# Patient Record
Sex: FEMALE | Race: WHITE | NOT HISPANIC OR LATINO | Employment: OTHER | URBAN - METROPOLITAN AREA
[De-identification: names, ages, dates, MRNs, and addresses within clinical notes are randomized per-mention and may not be internally consistent; named-entity substitution may affect disease eponyms.]

---

## 2017-01-17 ENCOUNTER — OFFICE VISIT (OUTPATIENT)
Dept: UROLOGY | Facility: CLINIC | Age: 67
End: 2017-01-17
Payer: MEDICARE

## 2017-01-17 VITALS
HEIGHT: 64 IN | SYSTOLIC BLOOD PRESSURE: 118 MMHG | BODY MASS INDEX: 25.41 KG/M2 | HEART RATE: 82 BPM | WEIGHT: 148.81 LBS | DIASTOLIC BLOOD PRESSURE: 72 MMHG

## 2017-01-17 DIAGNOSIS — Z00.00 WELLNESS EXAMINATION: ICD-10-CM

## 2017-01-17 DIAGNOSIS — N34.2 URETHRITIS: Primary | ICD-10-CM

## 2017-01-17 LAB
BILIRUB SERPL-MCNC: NORMAL MG/DL
BLOOD URINE, POC: NORMAL
COLOR, POC UA: YELLOW
GLUCOSE UR QL STRIP: NORMAL
KETONES UR QL STRIP: NORMAL
LEUKOCYTE ESTERASE URINE, POC: NORMAL
NITRITE, POC UA: NORMAL
PH, POC UA: 6
PROTEIN, POC: NORMAL
SPECIFIC GRAVITY, POC UA: 1.01
UROBILINOGEN, POC UA: NORMAL

## 2017-01-17 PROCEDURE — 81002 URINALYSIS NONAUTO W/O SCOPE: CPT | Mod: S$GLB,,, | Performed by: UROLOGY

## 2017-01-17 PROCEDURE — 1159F MED LIST DOCD IN RCRD: CPT | Mod: S$GLB,,, | Performed by: UROLOGY

## 2017-01-17 PROCEDURE — 99999 PR PBB SHADOW E&M-EST. PATIENT-LVL III: CPT | Mod: PBBFAC,,, | Performed by: UROLOGY

## 2017-01-17 PROCEDURE — 99203 OFFICE O/P NEW LOW 30 MIN: CPT | Mod: 25,S$GLB,, | Performed by: UROLOGY

## 2017-01-17 RX ORDER — CIPROFLOXACIN 500 MG/1
500 TABLET ORAL 2 TIMES DAILY
Qty: 6 TABLET | Refills: 0 | Status: SHIPPED | OUTPATIENT
Start: 2017-01-17 | End: 2017-01-20

## 2017-01-17 RX ORDER — ESTRADIOL 0.1 MG/G
1 CREAM VAGINAL EVERY OTHER DAY
Qty: 1 TUBE | Status: SHIPPED | OUTPATIENT
Start: 2017-01-17 | End: 2017-02-13

## 2017-01-17 NOTE — MR AVS SNAPSHOT
[facility] - Urology 4th Floor  [address]  [facility] [address]  Phone: [phone]                  Andra Dunn   2017 9:00 AM   Office Visit    Description:  Female : 1950   Provider:  Panda Hicks MD   Department:  [facility] - Urology 4th Floor           Reason for Visit     new patient           Diagnoses this Visit        Comments    Urethritis    -  Primary     Wellness examination                To Do List           Future Appointments        Provider Department Dept Phone    2017 1:00 PM Domo Lyon MD [facility] - Rheumatology [phone]      Goals (5 Years of Data)     None      Follow-Up and Disposition     Return if symptoms worsen or fail to improve.       These Medications        Disp Refills Start End    ciprofloxacin HCl (CIPRO) 500 MG tablet 6 tablet 0 2017    Take 1 tablet (500 mg total) by mouth 2 (two) times daily. - Oral    Pharmacy: [address] Ph #: [phone]       estradiol (ESTRACE) 0.01 % (0.1 mg/gram) vaginal cream 1 Tube prn 2017    Place 1 g vaginally every other day. - Vaginal    Pharmacy: [address] Ph #: [phone]         [facility]     [facility] On Call Nurse Care Line - 24/7 Assistance  Registered nurses in the [facility] On Call Center provide clinical advisement, health education, appointment booking, and other advisory services.  Call for this free service at [phone].             Medications           Message regarding Medications     Verify the changes and/or additions to your medication regime listed below are the same as discussed with your clinician today.  If any of these changes or additions are incorrect, please notify your healthcare provider.        START taking these NEW medications        Refills    ciprofloxacin HCl (CIPRO) 500 MG tablet 0    Sig: Take 1 tablet (500  "mg total) by mouth 2 (two) times daily.    Class: Normal    Route: Oral    estradiol (ESTRACE) 0.01 % (0.1 mg/gram) vaginal cream prn    Sig: Place 1 g vaginally every other day.    Class: Print    Route: Vaginal           Verify that the below list of medications is an accurate representation of the medications you are currently taking.  If none reported, the list may be blank. If incorrect, please contact your healthcare provider. Carry this list with you in case of emergency.           Current Medications     ciprofloxacin HCl (CIPRO) 500 MG tablet Take 1 tablet (500 mg total) by mouth 2 (two) times daily.    estradiol (ESTRACE) 0.01 % (0.1 mg/gram) vaginal cream Place 1 g vaginally every other day.           Clinical Reference Information           Vital Signs - Last Recorded  Most recent update: 1/17/2017  9:15 AM by Ina Huerta MA    BP Pulse Ht Wt BMI    118/72 82 5' 4" (1.626 m) 67.5 kg (148 lb 13 oz) 25.54 kg/m2      Blood Pressure          Most Recent Value    BP  118/72      Allergies as of 1/17/2017     No Known Allergies      Immunizations Administered on Date of Encounter - 1/17/2017     None      Orders Placed During Today's Visit      Normal Orders This Visit    POCT urine dipstick without microscope     Future Labs/Procedures Expected by Expires    CBC auto differential  1/17/2017 1/17/2018    Comprehensive metabolic panel  1/17/2017 1/17/2018    Hemoglobin A1c  1/17/2017 1/17/2018    Lipid panel  1/17/2017 1/17/2018    Cystoscopy  As directed 1/17/2018      "

## 2017-01-17 NOTE — PROGRESS NOTES
CHIEF COMPLAINT:    Mrs. Dunn is a 66 y.o. female presenting with discomfort with urination    PRESENTING ILLNESS:    Andra Dunn is a 66 y.o. female who presents today with complaints of a mild discomfort when urinating for the last month. This is mostly when she is dehydrated and resolves after consuming a few bottles of water.   The discomfort is located in the urethral area. She denies dysuria, hematuria, flank pain. Denies frequency, urgency, incontinence.   No history of kidney stones.  No family history of  malignancy.   No smoking history.    REVIEW OF SYSTEMS:    Andra Dunn denies any history of headache, blurred vision, fever, nausea, vomiting, chills, abdominal pain, bleeding per rectum, cough, SOB, recent loss of consciousness, recent mental status changes, seizures, dizziness, or upper or lower extremity weakness.    PATIENT HISTORY:    History reviewed. No pertinent past medical history.    Past Surgical History   Procedure Laterality Date    Cholecystectomy         Family History   Problem Relation Age of Onset    Breast cancer Sister 60    Colon cancer Neg Hx     Ovarian cancer Neg Hx        Social History     Social History    Marital status:      Spouse name: N/A    Number of children: N/A    Years of education: N/A     Occupational History    Not on file.     Social History Main Topics    Smoking status: Never Smoker    Smokeless tobacco: Not on file    Alcohol use Yes      Comment: socially    Drug use: No    Sexual activity: Not Currently     Partners: Male     Birth control/ protection: Post-menopausal     Other Topics Concern    Not on file     Social History Narrative       Allergies:  Review of patient's allergies indicates no known allergies.    Medications:    Current Outpatient Prescriptions:     ciprofloxacin HCl (CIPRO) 500 MG tablet, Take 1 tablet (500 mg total) by mouth 2 (two) times daily., Disp: 6 tablet, Rfl: 0    PHYSICAL EXAMINATION:    The  patient generally appears in good health, is appropriately interactive, and is in no apparent distress.     Eyes: anicteric sclerae, moist conjunctivae; no lid-lag; PERRLA     HENT: Atraumatic; oropharynx clear with moist mucous membranes and no mucosal ulcerations;normal hard and soft palate. No evidence of lymphadenopathy.    Neck: Trachea midline.  No thyromegaly.    Musculoskeletal: No abnormal gait.    Skin: No lesions.    Mental: Cooperative with normal affect.  Is oriented to time, place, and person.    Neuro: Grossly intact.    Chest: Normal inspiratory effort.   No accessory muscles.  No audible wheezes.  Respirations symmetric on inspiration and expiration.    Heart: Regular rhythm.      Abdomen:  Soft, non-tender. No masses or organomegaly. Bladder is not palpable. No evidence of flank discomfort.       LABS:    UA today + nitrite and leukocytes    IMPRESSION:    Encounter Diagnoses   Name Primary?    Urethritis Yes       PLAN:    Urethritis  -     POCT urine dipstick without microscope  -     Cystoscopy; Future  -     ciprofloxacin HCl (CIPRO) 500 MG tablet; Take 1 tablet (500 mg total) by mouth 2 (two) times daily.  Dispense: 6 tablet; Refill: 0      1. Urine sent for culture  2. 3 days of cipro sent to pharmacy empirically  3. CBC, CMP, lipid panel   4. Encourage PO fluid intake  5. Estrace cream     Addendum:  Recommend to follow up with her PCP.

## 2017-01-18 ENCOUNTER — OFFICE VISIT (OUTPATIENT)
Dept: RHEUMATOLOGY | Facility: CLINIC | Age: 67
End: 2017-01-18
Payer: MEDICARE

## 2017-01-18 ENCOUNTER — LAB VISIT (OUTPATIENT)
Dept: LAB | Facility: HOSPITAL | Age: 67
End: 2017-01-18
Attending: INTERNAL MEDICINE
Payer: MEDICARE

## 2017-01-18 ENCOUNTER — TELEPHONE (OUTPATIENT)
Dept: INTERNAL MEDICINE | Facility: CLINIC | Age: 67
End: 2017-01-18

## 2017-01-18 ENCOUNTER — TELEPHONE (OUTPATIENT)
Dept: UROLOGY | Facility: CLINIC | Age: 67
End: 2017-01-18

## 2017-01-18 VITALS
HEART RATE: 83 BPM | BODY MASS INDEX: 25.89 KG/M2 | WEIGHT: 151.63 LBS | HEIGHT: 64 IN | SYSTOLIC BLOOD PRESSURE: 111 MMHG | DIASTOLIC BLOOD PRESSURE: 69 MMHG

## 2017-01-18 DIAGNOSIS — R79.89 ABNORMAL LFTS: ICD-10-CM

## 2017-01-18 DIAGNOSIS — R74.8 ELEVATED ALKALINE PHOSPHATASE LEVEL: ICD-10-CM

## 2017-01-18 DIAGNOSIS — M19.042 PRIMARY OSTEOARTHRITIS OF BOTH HANDS: Primary | Chronic | ICD-10-CM

## 2017-01-18 DIAGNOSIS — R79.89 ABNORMAL LFTS: Primary | ICD-10-CM

## 2017-01-18 DIAGNOSIS — M19.042 PRIMARY OSTEOARTHRITIS OF BOTH HANDS: Chronic | ICD-10-CM

## 2017-01-18 DIAGNOSIS — M19.041 PRIMARY OSTEOARTHRITIS OF BOTH HANDS: Chronic | ICD-10-CM

## 2017-01-18 DIAGNOSIS — M19.041 PRIMARY OSTEOARTHRITIS OF BOTH HANDS: Primary | Chronic | ICD-10-CM

## 2017-01-18 LAB — FERRITIN SERPL-MCNC: 459 NG/ML

## 2017-01-18 PROCEDURE — 36415 COLL VENOUS BLD VENIPUNCTURE: CPT

## 2017-01-18 PROCEDURE — 82728 ASSAY OF FERRITIN: CPT

## 2017-01-18 PROCEDURE — 1159F MED LIST DOCD IN RCRD: CPT | Mod: S$GLB,,, | Performed by: INTERNAL MEDICINE

## 2017-01-18 PROCEDURE — 84075 ASSAY ALKALINE PHOSPHATASE: CPT

## 2017-01-18 PROCEDURE — 99204 OFFICE O/P NEW MOD 45 MIN: CPT | Mod: S$GLB,,, | Performed by: INTERNAL MEDICINE

## 2017-01-18 PROCEDURE — 86803 HEPATITIS C AB TEST: CPT

## 2017-01-18 PROCEDURE — 99999 PR PBB SHADOW E&M-EST. PATIENT-LVL III: CPT | Mod: PBBFAC,,, | Performed by: INTERNAL MEDICINE

## 2017-01-18 ASSESSMENT — ROUTINE ASSESSMENT OF PATIENT INDEX DATA (RAPID3)
TOTAL RAPID3 SCORE: 0
MDHAQ FUNCTION SCORE: 0
PSYCHOLOGICAL DISTRESS SCORE: 0
AM STIFFNESS SCORE: 0, NO
FATIGUE SCORE: 0
PATIENT GLOBAL ASSESSMENT SCORE: 0
PAIN SCORE: 0

## 2017-01-18 NOTE — PROGRESS NOTES
"Subjective:       Patient ID: Andra Matamoros is a 66 y.o. female.    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  She has a complicated history  of gallbladder disease with cholecystectomy followed by late recurrent gallstone with choledocholithiasis, located by sepsis.  She is currently scheduled for liver ultrasound.     Review of Systems   Constitutional: Negative for fatigue, fever and unexpected weight change.   HENT: Negative for mouth sores and trouble swallowing.         No Dry mouth   Eyes: Negative for redness.        No Dry eyes   Respiratory: Positive for cough. Negative for shortness of breath.    Cardiovascular: Negative for chest pain.   Gastrointestinal: Negative for abdominal pain, constipation and diarrhea.   Skin: Negative for rash.   Neurological: Negative for headaches.   Hematological: Negative for adenopathy. Does not bruise/bleed easily.   Psychiatric/Behavioral: Negative for sleep disturbance.         Objective:     Visit Vitals    /69    Pulse 83    Ht 5' 4" (1.626 m)    Wt 68.8 kg (151 lb 9.6 oz)    BMI 26.02 kg/m2        Physical Exam   Constitutional: She is well-developed, well-nourished, and in no distress.   HENT:   Mouth/Throat: Oropharynx is clear and moist.   Eyes: Conjunctivae are normal.   Cardiovascular: Normal rate and regular rhythm.    Pulmonary/Chest: She has no wheezes. She has no rales.   Lymphadenopathy:     She has no cervical adenopathy.   Neurological: " She is alert.   Skin: No rash noted.     Musculoskeletal:   Mild osteoarthritic changes of the hands  No other significant joint changes         Alkaline phosphatase 345  Ferritin 459  Assessment:       1. Primary osteoarthritis of both hands    2. Elevated alkaline phosphatase level        She has primary osteoarthritis of the hands that is most likely familial  The elevated alkaline phosphatase raises the possibility of Paget's disease, as her mother has, if the isoenzymes suggest bone and the liver ultrasound is unrevealing.    Plan:     1.  I will check a ferritin level in the remote chance that her osteoarthritis could be due to hemochromatosis  2.  We will await pending alkaline phosphatase isoenzyme studies and liver ultrasound but if necessary will plan a nuclear bone scan  3.  Meantime I suggested she try glucosamine for her hands  4.  We will schedule follow-up as needed.  She usually accompanies her mother who sees me regularly and will keep me informed with her workup in progress.

## 2017-01-18 NOTE — TELEPHONE ENCOUNTER
Called and talked to her about abnormal liver enzymes noted on her blood tests.  Recommend to follow up with her PCP.

## 2017-01-18 NOTE — MR AVS SNAPSHOT
"    [facility] - Rheumatology  [address]  [facility] [address]  Phone: [phone]  Fax: [phone]                  Andra Matamoros   2017 1:00 PM   Office Visit    Description:  Female : 1950   Provider:  Domo Lyon MD   Department:  [facility] - Rheumatology           Reason for Visit     Disease Management                To Do List           Goals (5 Years of Data)     None      Follow-Up and Disposition     Return if symptoms worsen or fail to improve.      [facility]     [facility] On Call Nurse Care Line -  Assistance  Registered nurses in the [facility] On Call Center provide clinical advisement, health education, appointment booking, and other advisory services.  Call for this free service at [phone].             Medications           Message regarding Medications     Verify the changes and/or additions to your medication regime listed below are the same as discussed with your clinician today.  If any of these changes or additions are incorrect, please notify your healthcare provider.             Verify that the below list of medications is an accurate representation of the medications you are currently taking.  If none reported, the list may be blank. If incorrect, please contact your healthcare provider. Carry this list with you in case of emergency.           Current Medications     ciprofloxacin HCl (CIPRO) 500 MG tablet Take 1 tablet (500 mg total) by mouth 2 (two) times daily.    estradiol (ESTRACE) 0.01 % (0.1 mg/gram) vaginal cream Place 1 g vaginally every other day.           Clinical Reference Information           Vital Signs - Last Recorded  Most recent update: 2017  1:02 PM by Leeann Handley RN    BP Pulse Ht Wt BMI    111/69 83 5' 4" (1.626 m) 68.8 kg (151 lb 9.6 oz) 26.02 kg/m2      Blood Pressure          Most Recent Value    BP  111/69      Allergies as of 2017     No Known Allergies      Immunizations Administered on Date of " Encounter - 1/18/2017     None      Instructions    Glucosamine/chondroitin sulfate 1500/1200 mg/d; many different brands; 2 popular ones are Osteobiflex and Cosamin DS  Ok to use Aleve or Advil when needed

## 2017-01-18 NOTE — PATIENT INSTRUCTIONS
Glucosamine/chondroitin sulfate 1500/1200 mg/d; many different brands; 2 popular ones are Osteobiflex and Cosamin DS  Ok to use Aleve or Advil when needed

## 2017-01-19 ENCOUNTER — HOSPITAL ENCOUNTER (OUTPATIENT)
Dept: RADIOLOGY | Facility: HOSPITAL | Age: 67
Discharge: HOME | End: 2017-01-19
Attending: INTERNAL MEDICINE
Payer: MEDICARE

## 2017-01-19 DIAGNOSIS — R79.89 ABNORMAL LFTS: ICD-10-CM

## 2017-01-19 LAB — HCV AB SERPL QL IA: NEGATIVE

## 2017-01-19 PROCEDURE — 76700 US EXAM ABDOM COMPLETE: CPT | Mod: 26,,, | Performed by: RADIOLOGY

## 2017-01-19 PROCEDURE — 76700 US EXAM ABDOM COMPLETE: CPT | Mod: TC

## 2017-01-23 LAB
ALP BONE CFR SERPL: 34 U/L (ref 5–58)
ALP BONE SERPL-CCNC: 11 % (ref 16–56)
ALP INTEST CFR SERPL: 3 U/L
ALP INTEST SERPL-CCNC: 1 %
ALP LIVER CFR SERPL: 271 U/L (ref 5–93)
ALP LIVER SERPL-CCNC: 88 % (ref 44–84)
ALP SERPL-CCNC: 308 U/L (ref 33–130)

## 2017-01-26 ENCOUNTER — TELEPHONE (OUTPATIENT)
Dept: TRANSPLANT | Facility: CLINIC | Age: 67
End: 2017-01-26

## 2017-01-26 NOTE — TELEPHONE ENCOUNTER
Pt labs in [facility] reviewed with only elevated  Alkp and alt.  Pt will be referred to Hepatology. Self referral. Plt to be  schedule pt in hepatology.

## 2017-01-26 NOTE — TELEPHONE ENCOUNTER
----- Message from Annetta Avial sent at 1/26/2017 10:40 AM CST -----  Contact: Elizabeth from [facility]   Pt has rec in [facility]  ----- Message -----     From: Brisa Caro     Sent: 1/25/2017   3:49 PM       To: [facility] Liver Referral Pool    Calling to get patient elevated liver. Please call

## 2017-02-13 ENCOUNTER — OFFICE VISIT (OUTPATIENT)
Dept: OPTOMETRY | Facility: CLINIC | Age: 67
End: 2017-02-13
Payer: MEDICARE

## 2017-02-13 DIAGNOSIS — H25.13 NUCLEAR SCLEROSIS, BILATERAL: Primary | ICD-10-CM

## 2017-02-13 DIAGNOSIS — H52.4 HYPEROPIA WITH ASTIGMATISM AND PRESBYOPIA, BILATERAL: ICD-10-CM

## 2017-02-13 DIAGNOSIS — H52.203 HYPEROPIA WITH ASTIGMATISM AND PRESBYOPIA, BILATERAL: ICD-10-CM

## 2017-02-13 DIAGNOSIS — H52.03 HYPEROPIA WITH ASTIGMATISM AND PRESBYOPIA, BILATERAL: ICD-10-CM

## 2017-02-13 DIAGNOSIS — Z13.5 GLAUCOMA SCREENING: ICD-10-CM

## 2017-02-13 PROCEDURE — 92015 DETERMINE REFRACTIVE STATE: CPT | Mod: S$GLB,,, | Performed by: OPTOMETRIST

## 2017-02-13 PROCEDURE — 99999 PR PBB SHADOW E&M-EST. PATIENT-LVL II: CPT | Mod: PBBFAC,,, | Performed by: OPTOMETRIST

## 2017-02-13 PROCEDURE — 92004 COMPRE OPH EXAM NEW PT 1/>: CPT | Mod: S$GLB,,, | Performed by: OPTOMETRIST

## 2017-02-13 PROCEDURE — 99499 UNLISTED E&M SERVICE: CPT | Mod: S$PBB,,, | Performed by: OPTOMETRIST

## 2017-02-13 NOTE — MR AVS SNAPSHOT
[facility]  [address]  [facility] [address]  Phone: [phone]  Fax: [phone]                  Andra Matamoros   2017 2:00 PM   Office Visit    Description:  Female : 1950   Provider:  Dalton Jimenez OD   Department:  [facility]           Reason for Visit     Concerns About Ocular Health           Diagnoses this Visit        Comments    Nuclear sclerosis, bilateral    -  Primary     Glaucoma screening         Hyperopia with astigmatism and presbyopia, bilateral                To Do List           Future Appointments        Provider Department Dept Phone    2017 9:20 AM MD [facility] - Hepatology [phone]    2017 10:00 AM AUDIOGRAM, AUDIO [facility] Audiology [phone]    2017 11:00 AM MD [facility] - Otorhinolaryngology [phone]      Goals (5 Years of Data)     None      Follow-Up and Disposition     Return in about 1 year (around 2018).      [facility] On Call     [facility] On Call Nurse Care Line - 24/ Assistance  Registered nurses in the [facility] On Call Center provide clinical advisement, health education, appointment booking, and other advisory services.  Call for this free service at [phone].             Medications           Message regarding Medications     Verify the changes and/or additions to your medication regime listed below are the same as discussed with your clinician today.  If any of these changes or additions are incorrect, please notify your healthcare provider.        STOP taking these medications     estradiol (ESTRACE) 0.01 % (0.1 mg/gram) vaginal cream Place 1 g vaginally every other day.           Verify that the below list of medications is an accurate representation of the medications you are currently taking.  If none reported, the list may be blank. If incorrect, please contact your healthcare provider. Carry this list with you in case of emergency.           Current  Medications            Clinical Reference Information           Allergies as of 2/13/2017     No Known Allergies      Immunizations Administered on Date of Encounter - 2/13/2017     None      Language Assistance Services     ATTENTION: Language assistance services are available, free of charge. Please call [phone].      ATENCIÓN: Si gamal escobedo, tiene a fontana disposición servicios gratuitos de asistencia lingüística. Llame al [phone].     CHÚ Ý: N?u b?n nói Ti?ng Vi?t, có các d?ch v? h? tr? ngôn ng? mi?n phí dành cho b?n. G?i s? [phone].         [facility] complies with applicable Federal civil rights laws and does not discriminate on the basis of race, color, national origin, age, disability, or sex.

## 2017-02-13 NOTE — PROGRESS NOTES
HPI     Ocular health exam   ADELIA 5-6 yrs ago   Ms. Silverman is here today to establish ocular health care.  Pt sts just wears otc readers +2.50.  Unsure of change in va since last   eye exam.  (-)Flashes (-)Floaters  (-)Itch, (-)tear, (-)burn, (-)Dryness.  (-) OTC Drops  (-)Photophobia  (-)Glare       Last edited by Georgia Morrow MA on 2/13/2017  1:30 PM.         Assessment /Plan     For exam results, see Encounter Report.    Nuclear sclerosis, bilateral  -Educated patient on presence of cataracts at today's exam, monitor at annual dilated fundus exam. 8+ years surgical estimate.    Glaucoma screening  -Monitor with annual eye exam and IOP check    Hyperopia with astigmatism and presbyopia, bilateral  Eyeglass Final Rx     Eyeglass Final Rx      Sphere Cylinder Axis Add   Right +1.00 +0.75 015 +2.50   Left +1.25 +0.50 155 +2.50       Expiration Date:  2/14/2018                  RTC 1 yr

## 2017-02-14 ENCOUNTER — LAB VISIT (OUTPATIENT)
Dept: LAB | Facility: HOSPITAL | Age: 67
End: 2017-02-14
Attending: INTERNAL MEDICINE
Payer: MEDICARE

## 2017-02-14 ENCOUNTER — OFFICE VISIT (OUTPATIENT)
Dept: HEPATOLOGY | Facility: CLINIC | Age: 67
End: 2017-02-14
Payer: MEDICARE

## 2017-02-14 VITALS
DIASTOLIC BLOOD PRESSURE: 91 MMHG | BODY MASS INDEX: 25.45 KG/M2 | TEMPERATURE: 97 F | OXYGEN SATURATION: 99 % | HEIGHT: 64 IN | SYSTOLIC BLOOD PRESSURE: 129 MMHG | RESPIRATION RATE: 18 BRPM | HEART RATE: 86 BPM | WEIGHT: 149.06 LBS

## 2017-02-14 DIAGNOSIS — M19.041 PRIMARY OSTEOARTHRITIS OF BOTH HANDS: Chronic | ICD-10-CM

## 2017-02-14 DIAGNOSIS — M19.042 PRIMARY OSTEOARTHRITIS OF BOTH HANDS: Chronic | ICD-10-CM

## 2017-02-14 DIAGNOSIS — R74.8 ELEVATED ALKALINE PHOSPHATASE LEVEL: ICD-10-CM

## 2017-02-14 DIAGNOSIS — R74.8 ELEVATED ALKALINE PHOSPHATASE LEVEL: Primary | ICD-10-CM

## 2017-02-14 LAB
ALBUMIN SERPL BCP-MCNC: 3.6 G/DL
ALP SERPL-CCNC: 299 U/L
ALT SERPL W/O P-5'-P-CCNC: 23 U/L
ANION GAP SERPL CALC-SCNC: 9 MMOL/L
AST SERPL-CCNC: 28 U/L
BILIRUB SERPL-MCNC: 0.6 MG/DL
BUN SERPL-MCNC: 14 MG/DL
CALCIUM SERPL-MCNC: 10.1 MG/DL
CHLORIDE SERPL-SCNC: 104 MMOL/L
CO2 SERPL-SCNC: 26 MMOL/L
CREAT SERPL-MCNC: 0.7 MG/DL
EST. GFR  (AFRICAN AMERICAN): >60 ML/MIN/1.73 M^2
EST. GFR  (NON AFRICAN AMERICAN): >60 ML/MIN/1.73 M^2
FERRITIN SERPL-MCNC: 432 NG/ML
GGT SERPL-CCNC: 147 U/L
GLUCOSE SERPL-MCNC: 98 MG/DL
IGA SERPL-MCNC: 415 MG/DL
IGG SERPL-MCNC: 1025 MG/DL
IGM SERPL-MCNC: 41 MG/DL
POTASSIUM SERPL-SCNC: 4.3 MMOL/L
PROT SERPL-MCNC: 7.7 G/DL
SODIUM SERPL-SCNC: 139 MMOL/L

## 2017-02-14 PROCEDURE — 86039 ANTINUCLEAR ANTIBODIES (ANA): CPT

## 2017-02-14 PROCEDURE — 86235 NUCLEAR ANTIGEN ANTIBODY: CPT | Mod: 59

## 2017-02-14 PROCEDURE — 82728 ASSAY OF FERRITIN: CPT

## 2017-02-14 PROCEDURE — 86038 ANTINUCLEAR ANTIBODIES: CPT

## 2017-02-14 PROCEDURE — 99999 PR PBB SHADOW E&M-EST. PATIENT-LVL III: CPT | Mod: PBBFAC,,, | Performed by: INTERNAL MEDICINE

## 2017-02-14 PROCEDURE — 80053 COMPREHEN METABOLIC PANEL: CPT

## 2017-02-14 PROCEDURE — 82784 ASSAY IGA/IGD/IGG/IGM EACH: CPT | Mod: 59

## 2017-02-14 PROCEDURE — 99204 OFFICE O/P NEW MOD 45 MIN: CPT | Mod: S$GLB,,, | Performed by: INTERNAL MEDICINE

## 2017-02-14 PROCEDURE — 82104 ALPHA-1-ANTITRYPSIN PHENO: CPT

## 2017-02-14 PROCEDURE — 82977 ASSAY OF GGT: CPT

## 2017-02-14 PROCEDURE — 36415 COLL VENOUS BLD VENIPUNCTURE: CPT

## 2017-02-14 PROCEDURE — 81256 HFE GENE: CPT

## 2017-02-14 PROCEDURE — 86235 NUCLEAR ANTIGEN ANTIBODY: CPT

## 2017-02-14 NOTE — PROGRESS NOTES
HEPATOLOGY CONSULTATION    Referring Physician:     Current Corresponding Physician: Dr. COURTNEY Parr    Reason for Consultation: Consultation for evaluation of Alkaline phosphatase elevation    History of Present Illness: Andra Matamoros is a 66 y.o. femalewho presents for evaluation of   Chief Complaint   Patient presents with    Alkaline phosphatase elevation     Andra Matamorso was seen in the Hepatology Clinic having been referred for an   elevated alkaline phosphatase.  She is a 66-year-old woman with a history of   osteoarthritis of the hands.  Reviewing her electronic medical record, it   appears as though she had elevated alkaline phosphatase about 7 years ago.  I   believe this was thought to be biliary in origin and she had a cholecystectomy.    Her alkaline phosphatase became normal and then subsequently I note that it has   become elevated again in the 300s.  Her ALT is mildly elevated.  She had a   recent abdominal ultrasound, which shows no biliary obstruction.  She has no   abdominal pain.  She describes no fatigue or pruritus.  She describes no   symptoms of sicca.  Her mother has an elevated alkaline phosphatase, but it is   thought to be on the basis of Paget's disease of the bone.  She had alkaline   phosphatase isoenzymes, which suggest hepatic origin.  I also note that her   ferritin is elevated at 469.      NG/HN  dd: 02/14/2017 09:50:28 (CST)  td: 02/15/2017 06:19:34 (CST)  Doc ID   #[id number]  Job ID #[id number]    CC:       History reviewed. No pertinent past medical history.  No outpatient encounter prescriptions on file as of 2/14/2017.     No facility-administered encounter medications on file as of 2/14/2017.      Review of patient's allergies indicates:  No Known Allergies  Family History   Problem Relation Age of Onset    Breast cancer Sister 60    Cancer Father     Vasculitis Mother     Paget's disease of bone Mother     No Known Problems Brother     No Known Problems Daughter      No Known Problems Son     Colon cancer Neg Hx     Ovarian cancer Neg Hx        Social History     Social History    Marital status:      Spouse name: N/A    Number of children: N/A    Years of education: N/A     Occupational History    Not on file.     Social History Main Topics    Smoking status: Never Smoker    Smokeless tobacco: Not on file    Alcohol use Yes      Comment: Grand Nguyen garcia    Drug use: No    Sexual activity: Not Currently     Partners: Male     Birth control/ protection: Post-menopausal     Other Topics Concern    Not on file     Social History Narrative     Review of Systems   Constitutional: Negative for appetite change, fatigue and unexpected weight change.   HENT: Negative for ear pain, hearing loss, sore throat and trouble swallowing.    Eyes: Negative for visual disturbance.   Respiratory: Negative for cough and shortness of breath.    Cardiovascular: Negative for chest pain and palpitations.   Gastrointestinal: Negative for abdominal pain, nausea and vomiting.   Genitourinary: Negative for difficulty urinating and dysuria.   Musculoskeletal: Positive for arthralgias. Negative for back pain.   Skin: Negative for rash.   Neurological: Negative for tremors, seizures and headaches.   Psychiatric/Behavioral: Negative for agitation and decreased concentration.     Vitals:    02/14/17 0905   BP: (!) 129/91   Pulse: 86   Resp: 18   Temp: 96.5 °F (35.8 °C)       Physical Exam   Constitutional: She is oriented to person, place, and time. She appears well-developed and well-nourished.   HENT:   Right Ear: External ear normal.   Left Ear: External ear normal.   Mouth/Throat: Oropharynx is clear and moist.   Eyes: No scleral icterus.   Cardiovascular: Normal rate, regular rhythm and normal heart sounds.  Exam reveals no gallop and no friction rub.    No murmur heard.  Pulmonary/Chest: Effort normal and breath sounds normal. No respiratory distress. She has no wheezes.    Abdominal: Soft. Bowel sounds are normal. She exhibits no distension and no mass. There is no tenderness.   Musculoskeletal: Normal range of motion. She exhibits no edema.   Lymphadenopathy:     She has no cervical adenopathy.   Neurological: She is alert and oriented to person, place, and time. She has normal strength.   Skin: Skin is warm, dry and intact. She is not diaphoretic. No pallor.       Computed MELD-Na score unavailable. Necessary lab results were not found.  Computed MELD score unavailable. Necessary lab results were not found.    Lab Results   Component Value Date     01/17/2017    BUN 18 01/17/2017    CREATININE 0.7 01/17/2017    CALCIUM 9.7 01/17/2017     01/17/2017    K 4.2 01/17/2017     01/17/2017    PROT 7.5 01/17/2017    CO2 30 (H) 01/17/2017    ANIONGAP 6 (L) 01/17/2017    WBC 8.27 01/17/2017    RBC 4.45 01/17/2017    HGB 13.0 01/17/2017    HCT 40.2 01/17/2017    MCV 90 01/17/2017    MCH 29.2 01/17/2017    MCHC 32.3 01/17/2017     Lab Results   Component Value Date    RDW 13.1 01/17/2017     01/17/2017    MPV 9.8 01/17/2017    GRAN 6.1 01/17/2017    GRAN 74.0 (H) 01/17/2017    LYMPH 1.2 01/17/2017    LYMPH 14.4 (L) 01/17/2017    MONO 0.7 01/17/2017    MONO 8.1 01/17/2017    EOSINOPHIL 2.8 01/17/2017    BASOPHIL 0.2 01/17/2017    EOS 0.2 01/17/2017    BASO 0.02 01/17/2017    APTT 28.8 07/16/2010     (H) 07/18/2010    CHOL 180 01/17/2017    TRIG 78 01/17/2017    HDL 69 01/17/2017    CHOLHDL 38.3 01/17/2017    TOTALCHOLEST 2.6 01/17/2017    ALBUMIN 3.6 01/17/2017    BILIDIR 0.5 (H) 07/21/2010    AST 36 01/17/2017    ALT 56 (H) 01/17/2017    ALKPHOS 345 (H) 01/17/2017    LABPROT 11.8 07/16/2010    INR 1.1 07/16/2010       Assessment and Plan:  Patient Active Problem List   Diagnosis    Primary osteoarthritis of both hands    Elevated alkaline phosphatase level     Andra Matamoros is a 66 y.o. female withAlkaline phosphatase elevation  Isoenzymes suggest hepatic  rather than bone origin.  U/S does not support biliary obstruction.  Plan: AMA, IgM to r/o PBC.  Hemochromatosis gene test (elevated ferritin).  Consider liver biopsy.  RTC in 6 months.    Outside Records Request:

## 2017-02-14 NOTE — MR AVS SNAPSHOT
[facility] - Hepatology  [address]  [facility] [address]  Phone: [phone]  Fax: [phone]                  Andra Matamoros   2017 9:20 AM   Office Visit    Description:  Female : 1950   Provider:  Dwayne Sanches MD   Department:  [facility]           Reason for Visit     Alkaline phosphatase elevation           Diagnoses this Visit        Comments    Elevated alkaline phosphatase level    -  Primary     Primary osteoarthritis of both hands                To Do List           Future Appointments        Provider Department Dept Phone    2017 10:00 AM LAB, TRANSPLANT [facility] [phone]    2017 10:00 AM AUDIOGRAM, AUDIO [facility] Audiolog [phone]    2017 11:00 AM David Mckeon MD [facility] Otorhinolaryngology [phone]      Goals (5 Years of Data)     None      Follow-Up and Disposition     Return in about 6 months (around 2017).      [facility]     [facility] Nurse Care Line - 24/7 Assistance  Registered nurses in the [facility] provide clinical advisement, health education, appointment booking, and other advisory services.  Call for this free service at [phone].             Medications           Message regarding Medications     Verify the changes and/or additions to your medication regime listed below are the same as discussed with your clinician today.  If any of these changes or additions are incorrect, please notify your healthcare provider.             Verify that the below list of medications is an accurate representation of the medications you are currently taking.  If none reported, the list may be blank. If incorrect, please contact your healthcare provider. Carry this list with you in case of emergency.                Clinical Reference Information           Your Vitals Were     BP Pulse Temp Resp Height Weight    129/91 (BP Location: Left arm, Patient Position: Sitting) 86  "96.5 °F (35.8 °C) (Oral) 18 5' 4" (1.626 m) 67.6 kg (149 lb 0.5 oz)    SpO2 BMI             99% 25.58 kg/m2         Blood Pressure          Most Recent Value    BP  (!)  129/91      Allergies as of 2/14/2017     No Known Allergies      Immunizations Administered on Date of Encounter - 2/14/2017     None      Orders Placed During Today's Visit     Future Labs/Procedures Expected by Expires    Alpha 1 Antitrypsin Phenotype  2/14/2017 4/15/2018    BOBY  2/14/2017 4/15/2018    ANTIMITOCHONDRIAL ANTIBODY  2/14/2017 4/15/2018    Ferritin  2/14/2017 4/15/2018    Gamma GT  2/14/2017 4/15/2018    Hemochromatosis DNA Analysis (PCR)  2/14/2017 4/15/2018    Immunoglobulins (IgG, IgA, IgM) Quantitative  2/14/2017 4/15/2018    Recurring Lab Work Interval Expires    Comprehensive metabolic panel   2/14/2021      Language Assistance Services     ATTENTION: Language assistance services are available, free of charge. Please call [phone].      ATENCIÓN: Si habla español, tiene a fontana disposición servicios gratuitos de asistencia lingüística. Llame al [phone].     CHÚ Ý: N?u b?n nói Ti?ng Vi?t, có các d?ch v? h? tr? ngôn ng? mi?n phí dành cho b?n. G?i s? [phone].         [facility] complies with applicable Federal civil rights laws and does not discriminate on the basis of race, color, national origin, age, disability, or sex.        "

## 2017-02-15 LAB
ANA SER QL IF: POSITIVE
ANA TITR SER IF: NORMAL {TITER}
MITOCHONDRIA AB TITR SER IF: NORMAL {TITER}

## 2017-02-16 LAB
A1AT PHENOTYP SERPL-IMP: NORMAL BANDS
A1AT SERPL NEPH-MCNC: 187 MG/DL
ANTI SM ANTIBODY: 0.32 EU
ANTI SM/RNP ANTIBODY: 0.76 EU
ANTI-SM INTERPRETATION: NEGATIVE
ANTI-SM/RNP INTERPRETATION: NEGATIVE
ANTI-SSA ANTIBODY: 0.62 EU
ANTI-SSA INTERPRETATION: NEGATIVE
ANTI-SSB ANTIBODY: 0 EU
ANTI-SSB INTERPRETATION: NEGATIVE
DSDNA AB SER-ACNC: NORMAL [IU]/ML

## 2017-02-17 LAB
GENETICIST REVIEW: NORMAL
HFE GENE MUT ANL BLD/T: NORMAL
HFE RELEASED BY: NORMAL
HFE RESULT SUMMARY: NORMAL
REF LAB TEST METHOD: NORMAL
SPECIMEN SOURCE: NORMAL
SPECIMEN,  HEMOCHROMATOSIS: NORMAL

## 2017-02-20 ENCOUNTER — INITIAL CONSULT (OUTPATIENT)
Dept: OTOLARYNGOLOGY | Facility: CLINIC | Age: 67
End: 2017-02-20
Payer: MEDICARE

## 2017-02-20 ENCOUNTER — CLINICAL SUPPORT (OUTPATIENT)
Dept: AUDIOLOGY | Facility: CLINIC | Age: 67
End: 2017-02-20
Payer: MEDICARE

## 2017-02-20 ENCOUNTER — HOSPITAL ENCOUNTER (OUTPATIENT)
Dept: RADIOLOGY | Facility: HOSPITAL | Age: 67
Discharge: HOME | End: 2017-02-20
Attending: INTERNAL MEDICINE
Payer: MEDICARE

## 2017-02-20 ENCOUNTER — TELEPHONE (OUTPATIENT)
Dept: INTERNAL MEDICINE | Facility: CLINIC | Age: 67
End: 2017-02-20

## 2017-02-20 VITALS — WEIGHT: 147.25 LBS | BODY MASS INDEX: 25.14 KG/M2 | HEIGHT: 64 IN

## 2017-02-20 DIAGNOSIS — H90.A32 MIXED CONDUCTIVE AND SENSORINEURAL HEARING LOSS OF LEFT EAR WITH RESTRICTED HEARING OF RIGHT EAR: Primary | ICD-10-CM

## 2017-02-20 DIAGNOSIS — R05.3 COUGH, PERSISTENT: ICD-10-CM

## 2017-02-20 DIAGNOSIS — H80.90 OTOSCLEROSIS, UNSPECIFIED: Primary | ICD-10-CM

## 2017-02-20 DIAGNOSIS — R05.3 COUGH, PERSISTENT: Primary | ICD-10-CM

## 2017-02-20 PROCEDURE — 71020 XR CHEST PA AND LATERAL: CPT | Mod: TC

## 2017-02-20 PROCEDURE — 71020 XR CHEST PA AND LATERAL: CPT | Mod: 26,,, | Performed by: RADIOLOGY

## 2017-02-20 PROCEDURE — 92550 TYMPANOMETRY & REFLEX THRESH: CPT | Mod: S$GLB,,, | Performed by: AUDIOLOGIST

## 2017-02-20 PROCEDURE — 92557 COMPREHENSIVE HEARING TEST: CPT | Mod: S$GLB,,, | Performed by: AUDIOLOGIST

## 2017-02-20 PROCEDURE — 99203 OFFICE O/P NEW LOW 30 MIN: CPT | Mod: S$GLB,,, | Performed by: OTOLARYNGOLOGY

## 2017-02-20 PROCEDURE — 99999 PR PBB SHADOW E&M-EST. PATIENT-LVL II: CPT | Mod: PBBFAC,,, | Performed by: OTOLARYNGOLOGY

## 2017-02-20 NOTE — LETTER
February 20, 2017      Domo Louis Jr., MD  [address] [place] [address]           [facility]  [address]  [facility] [address]  Phone: [phone]  Fax: [phone]          Patient: Andra Matamoros   MR Number: [id number]   YOB: 1950   Date of Visit: 2/20/2017       Dear Dr. Domo Louis Jr.:    Thank you for referring Andra Matamoros to me for evaluation. Attached you will find relevant portions of my assessment and plan of care.    If you have questions, please do not hesitate to call me. I look forward to following Andra Matamoros along with you.    Sincerely,    David Mckeon MD    Enclosure  CC:  No Recipients    If you would like to receive this communication electronically, please contact [email or web address] or [phone] to request more information on [employer] Link access.    For providers and/or their staff who would like to refer a patient to [facility], please contact us through our one-stop-shop provider referral line, [facility], at [phone].    If you feel you have received this communication in error or would no longer like to receive these types of communications, please e-mail [email or web address]

## 2017-02-20 NOTE — PROGRESS NOTES
"Subjective:       Andra Dunn is a 66 y.o. female who I was asked to see in consultation for evaluation of otosclerosis. Ms Matamoros was diagnosed 15 years ago and had previous ME prothesis placed in [facility]. Patient reports good hearing AU and does not note any difference in hearing between her ears. She is happy with her current hearing level and does not note any significant imparement in her abilty to communicate effectively. The patient reports no noted difficulties.  There is not a history of otitis media.  There is a history of otosclerosis.  There is not a history of hearing loss at a young age in the family.    Ref Dr. Louis    The following portions of the patient's history were reviewed and updated as appropriate: allergies, current medications, past family history, past medical history, past social history, past surgical history and problem list.    Review of Systems  Constitutional: negative for anorexia, chills, fatigue and fevers  Eyes: negative for visual disturbance  Ears, nose, mouth, throat, and face: positive for decreasede hearing, negative for earaches, sore throat, tinnitus and voice change  Respiratory: negative for dyspnea on exertion  Cardiovascular: negative for exertional chest pressure/discomfort  Gastrointestinal: negative for abdominal pain  Musculoskeletal:negative for arthralgias and back pain  Neurological: negative for coordination problems, dizziness and gait problems  Behavioral/Psych: negative for behavior problems  Endocrine: negative  Allergic/Immunologic: negative for hay fever and urticaria      Objective:        Visit Vitals    Ht 5' 4" (1.626 m)    Wt 66.8 kg (147 lb 4.3 oz)    BMI 25.28 kg/m2       General:   healthy, alert, appears stated age, not in distress   Head and Face:   facial movement was normal and symmetrical, nontender   External Ears:   normal pinnae shape and position   Ext. Aud. Canal:  Right:patent    Left: patent    Tympanic Mem:  Right: " normal landmarks and mobility   Left: normal landmarks and mobility   Nose:  Nares normal. Septum midline. Mucosa normal. No drainage or sinus tenderness.   Oropharynx:   normal tongue movement, palate mobile   Tonsils:   normal size, normal appearance   Post. Pharynx:   normal mucosa   Neck:   no asymmetry, masses, or scars   Thyroid:   Normal            Assessment:      otosclerosis       Plan:      1. I recommend hearing aid trial versus operative intervention vs observation.    2. The risks and benefits of my recommendations, as well as other treatment options were discussed with the patient today.  3. Return for follow-up as needed.      Patient with Left Otosclerosis. Discussed Left Laser Stapedotomy. In addition reviewed observation and amplification as options. Risks, complications, alternatives and goals reviewed. Included failure to improve, complete and total loss of hearing, dizziness acute and chronic, chorda symptoms, infection, bleeding, the need for revision and other potential complications.

## 2017-03-16 ENCOUNTER — OFFICE VISIT (OUTPATIENT)
Dept: INTERNAL MEDICINE | Facility: CLINIC | Age: 67
End: 2017-03-16
Payer: MEDICARE

## 2017-03-16 VITALS
BODY MASS INDEX: 24.94 KG/M2 | HEART RATE: 92 BPM | DIASTOLIC BLOOD PRESSURE: 70 MMHG | SYSTOLIC BLOOD PRESSURE: 118 MMHG | TEMPERATURE: 98 F | WEIGHT: 145.31 LBS

## 2017-03-16 DIAGNOSIS — Z00.00 ROUTINE GENERAL MEDICAL EXAMINATION AT A HEALTH CARE FACILITY: Primary | ICD-10-CM

## 2017-03-16 DIAGNOSIS — R05.9 COUGH: ICD-10-CM

## 2017-03-16 DIAGNOSIS — R79.89 ELEVATED FERRITIN LEVEL: ICD-10-CM

## 2017-03-16 DIAGNOSIS — R74.8 ELEVATED ALKALINE PHOSPHATASE LEVEL: ICD-10-CM

## 2017-03-16 PROCEDURE — 99214 OFFICE O/P EST MOD 30 MIN: CPT | Mod: S$GLB,,, | Performed by: INTERNAL MEDICINE

## 2017-03-16 PROCEDURE — 1160F RVW MEDS BY RX/DR IN RCRD: CPT | Mod: S$GLB,,, | Performed by: INTERNAL MEDICINE

## 2017-03-16 PROCEDURE — 99999 PR PBB SHADOW E&M-EST. PATIENT-LVL III: CPT | Mod: PBBFAC,,, | Performed by: INTERNAL MEDICINE

## 2017-03-16 NOTE — PROGRESS NOTES
Subjective:       Patient ID: Andra Matamoros is a 66 y.o. female.    Chief Complaint: Annual Exam    HPIPleasant lady from [place] here for annual exam and discussion recent test results. Overall doing well and has no specific complaints. She reports a chronic, dry cough x 3 years. She is a non-smoker, takes no medications, but notes occasional reflux. I discussed how reflux may at times cause cough, but will refer her to T for closer look in that area and proceed from there. I also discussed her hx of elevated alk phos levels that were noted during routine blood work. I obtained additional studies to look into this further including iso-enzymes that determined this to be mostly from liver source. Other lab work also showed elevated LFT's, transferrin. I referred her to Hepatology and was seen by Dr Simpson who obtained additional labs as well. She has not had follow up with him and this appt was made for her as sh may need liver biopsy. I discussed the various entities that are being evaluated including hemochromatosis, PCB. These wee discussed in some detail. I will see her back once she has seen Hepatology. Otherwise doing well.  Review of Systems   Constitutional: Negative for activity change, appetite change, fatigue and unexpected weight change.   HENT: Negative.    Eyes: Negative for visual disturbance.   Respiratory: Positive for cough. Negative for shortness of breath and wheezing.    Cardiovascular: Negative for chest pain, palpitations and leg swelling.   Gastrointestinal: Negative for abdominal distention, abdominal pain, blood in stool and nausea.   Endocrine: Negative.    Genitourinary: Negative for difficulty urinating.   Musculoskeletal: Negative for arthralgias, back pain and joint swelling.   Skin: Negative.    Neurological: Negative for dizziness, weakness, light-headedness and headaches.   Hematological: Negative.        Objective:      Physical Exam   Constitutional: She is oriented to  person, place, and time. She appears well-developed and well-nourished. No distress.   HENT:   Head: Normocephalic and atraumatic.   Right Ear: External ear normal.   Left Ear: External ear normal.   Mouth/Throat: Oropharynx is clear and moist. No oropharyngeal exudate.   Eyes: Conjunctivae and EOM are normal. Pupils are equal, round, and reactive to light. Right eye exhibits no discharge. Left eye exhibits no discharge. No scleral icterus.   Neck: Normal range of motion. Neck supple. No JVD present. No thyromegaly present.   Cardiovascular: Normal rate, regular rhythm, normal heart sounds and intact distal pulses.    No murmur heard.  Pulmonary/Chest: Effort normal and breath sounds normal. No respiratory distress. She has no wheezes. She exhibits no tenderness.   Abdominal: Soft. Bowel sounds are normal. She exhibits no distension and no mass. There is no tenderness.   Musculoskeletal: Normal range of motion. She exhibits no edema or tenderness.   Lymphadenopathy:     She has no cervical adenopathy.   Neurological: She is alert and oriented to person, place, and time. No cranial nerve deficit.   Skin: Skin is warm and dry. No rash noted. She is not diaphoretic. No erythema.   Psychiatric: She has a normal mood and affect. Her behavior is normal. Judgment and thought content normal.   Nursing note and vitals reviewed.      Assessment:       1. Routine general medical examination at a health care facility    2. Elevated alkaline phosphatase level    3. Elevated ferritin level    4. Cough        Plan:    1. Refer to ENT.         2. Follow up with Hepatology.         3. RTC after above.

## 2017-03-21 ENCOUNTER — TELEPHONE (OUTPATIENT)
Dept: OPHTHALMOLOGY | Facility: CLINIC | Age: 67
End: 2017-03-21

## 2017-03-21 ENCOUNTER — OFFICE VISIT (OUTPATIENT)
Dept: HEPATOLOGY | Facility: CLINIC | Age: 67
End: 2017-03-21
Payer: MEDICARE

## 2017-03-21 ENCOUNTER — TELEPHONE (OUTPATIENT)
Dept: HEPATOLOGY | Facility: CLINIC | Age: 67
End: 2017-03-21

## 2017-03-21 VITALS
TEMPERATURE: 97 F | RESPIRATION RATE: 16 BRPM | OXYGEN SATURATION: 97 % | BODY MASS INDEX: 24.96 KG/M2 | SYSTOLIC BLOOD PRESSURE: 121 MMHG | HEART RATE: 95 BPM | WEIGHT: 146.19 LBS | DIASTOLIC BLOOD PRESSURE: 68 MMHG | HEIGHT: 64 IN

## 2017-03-21 DIAGNOSIS — R74.8 ELEVATED ALKALINE PHOSPHATASE LEVEL: Primary | ICD-10-CM

## 2017-03-21 PROCEDURE — 99213 OFFICE O/P EST LOW 20 MIN: CPT | Mod: S$GLB,,, | Performed by: INTERNAL MEDICINE

## 2017-03-21 PROCEDURE — 99999 PR PBB SHADOW E&M-EST. PATIENT-LVL III: CPT | Mod: PBBFAC,,, | Performed by: INTERNAL MEDICINE

## 2017-03-21 PROCEDURE — 1160F RVW MEDS BY RX/DR IN RCRD: CPT | Mod: S$GLB,,, | Performed by: INTERNAL MEDICINE

## 2017-03-21 NOTE — PROGRESS NOTES
HEPATOLOGY FOLLOW UP    Referring Physician: Dr. COURTNEY Parr  Current Corresponding Physician: Dr. COURTNEY Parr    Andra Matamoros is here for follow up of Elevated Hepatic Enzymes      HPI  Dictation #1  MRN:[id number]  CSN:[id number]      No outpatient encounter prescriptions on file as of 3/21/2017.     No facility-administered encounter medications on file as of 3/21/2017.      Review of patient's allergies indicates:  No Known Allergies  History reviewed. No pertinent past medical history.    Review of Systems   Constitutional: Negative for appetite change, fatigue and unexpected weight change.   HENT: Negative for ear pain, hearing loss, sore throat and trouble swallowing.    Eyes: Negative for visual disturbance.   Respiratory: Positive for cough. Negative for shortness of breath.    Cardiovascular: Negative for chest pain and palpitations.   Gastrointestinal: Negative for abdominal pain, nausea and vomiting.   Genitourinary: Negative for difficulty urinating and dysuria.   Musculoskeletal: Negative for arthralgias and back pain.   Skin: Negative for rash.   Neurological: Negative for tremors, seizures and headaches.   Psychiatric/Behavioral: Negative for agitation and decreased concentration.     Vitals:    03/21/17 1526   BP: 121/68   Pulse: 95   Resp: 16   Temp: 97 °F (36.1 °C)       Physical Exam   Constitutional: She is oriented to person, place, and time. She appears well-developed and well-nourished.   HENT:   Right Ear: External ear normal.   Left Ear: External ear normal.   Mouth/Throat: Oropharynx is clear and moist.   Eyes: No scleral icterus.   Cardiovascular: Normal rate, regular rhythm and normal heart sounds.  Exam reveals no gallop and no friction rub.    No murmur heard.  Pulmonary/Chest: Effort normal and breath sounds normal. No respiratory distress. She has no wheezes.   Abdominal: Soft. Bowel sounds are normal. She exhibits no distension and no mass. There is no tenderness.    Musculoskeletal: Normal range of motion. She exhibits no edema.   Lymphadenopathy:     She has no cervical adenopathy.   Neurological: She is alert and oriented to person, place, and time. She has normal strength.   Skin: Skin is warm, dry and intact. She is not diaphoretic. No pallor.       Computed MELD-Na score unavailable. Necessary lab results were not found.  Computed MELD score unavailable. Necessary lab results were not found.    Lab Results   Component Value Date    GLU 98 02/14/2017    BUN 14 02/14/2017    CREATININE 0.7 02/14/2017    CALCIUM 10.1 02/14/2017     02/14/2017    K 4.3 02/14/2017     02/14/2017    PROT 7.7 02/14/2017    CO2 26 02/14/2017    ANIONGAP 9 02/14/2017    WBC 8.27 01/17/2017    RBC 4.45 01/17/2017    HGB 13.0 01/17/2017    HCT 40.2 01/17/2017    MCV 90 01/17/2017    MCH 29.2 01/17/2017    MCHC 32.3 01/17/2017     Lab Results   Component Value Date    RDW 13.1 01/17/2017     01/17/2017    MPV 9.8 01/17/2017    GRAN 6.1 01/17/2017    GRAN 74.0 (H) 01/17/2017    LYMPH 1.2 01/17/2017    LYMPH 14.4 (L) 01/17/2017    MONO 0.7 01/17/2017    MONO 8.1 01/17/2017    EOSINOPHIL 2.8 01/17/2017    BASOPHIL 0.2 01/17/2017    EOS 0.2 01/17/2017    BASO 0.02 01/17/2017    APTT 28.8 07/16/2010     (H) 07/18/2010    CHOL 180 01/17/2017    TRIG 78 01/17/2017    HDL 69 01/17/2017    CHOLHDL 38.3 01/17/2017    TOTALCHOLEST 2.6 01/17/2017    ALBUMIN 3.6 02/14/2017    BILIDIR 0.5 (H) 07/21/2010    AST 28 02/14/2017    ALT 23 02/14/2017    ALKPHOS 299 (H) 02/14/2017    LABPROT 11.8 07/16/2010    INR 1.1 07/16/2010       Assessment and Plan:  Patient Active Problem List   Diagnosis    Primary osteoarthritis of both hands    Elevated alkaline phosphatase level     Andragalo Matamoros is a 66 y.o. female withElevated Hepatic Enzymes    Etiology of elevated ALP unclear but likely biliary in nature.  ACE level to r/o sarcoid.  Proceed with U/S and U/S guided liver biopsy.

## 2017-03-21 NOTE — TELEPHONE ENCOUNTER
Patient seen in clinic today 3/21/2017 with Dr Dwayne Sanches. New orders to follow.  The patient needs to have an U/S Guided Liver Biopsy. Pre and Post procedure teaching done with the patient and her .  Written instructions given to the patient also.    The patient is going out of town and will schedule the Pre testing U/S Labs upon return. Patient will contact the clinic on her return back to town.LENI

## 2017-03-21 NOTE — TELEPHONE ENCOUNTER
----- Message from Samantha Brown sent at 3/21/2017  1:35 PM CDT -----  Contact: Andra Matamoros   Pt would like speak with  nurse about the eye glass prescription pt can be reached at [phone] please thanks.

## 2017-03-23 ENCOUNTER — OFFICE VISIT (OUTPATIENT)
Dept: OTOLARYNGOLOGY | Facility: CLINIC | Age: 67
End: 2017-03-23
Payer: MEDICARE

## 2017-03-23 VITALS
SYSTOLIC BLOOD PRESSURE: 104 MMHG | HEART RATE: 86 BPM | HEIGHT: 64 IN | DIASTOLIC BLOOD PRESSURE: 69 MMHG | WEIGHT: 144.19 LBS | BODY MASS INDEX: 24.62 KG/M2 | TEMPERATURE: 98 F

## 2017-03-23 DIAGNOSIS — K21.9 HIATAL HERNIA WITH GERD: ICD-10-CM

## 2017-03-23 DIAGNOSIS — R05.9 COUGH IN ADULT: Primary | ICD-10-CM

## 2017-03-23 DIAGNOSIS — K44.9 HIATAL HERNIA WITH GERD: ICD-10-CM

## 2017-03-23 PROCEDURE — 99213 OFFICE O/P EST LOW 20 MIN: CPT | Mod: S$GLB,,, | Performed by: OTOLARYNGOLOGY

## 2017-03-23 PROCEDURE — 99999 PR PBB SHADOW E&M-EST. PATIENT-LVL III: CPT | Mod: PBBFAC,,, | Performed by: OTOLARYNGOLOGY

## 2017-03-23 PROCEDURE — 99499 UNLISTED E&M SERVICE: CPT | Mod: S$PBB,,, | Performed by: OTOLARYNGOLOGY

## 2017-03-23 PROCEDURE — 1160F RVW MEDS BY RX/DR IN RCRD: CPT | Mod: S$GLB,,, | Performed by: OTOLARYNGOLOGY

## 2017-03-23 NOTE — PATIENT INSTRUCTIONS
Endoscopy performed  I recommend consultation with Dr. [facility] Velazquez + speech evaluation with COURTNEY Bruno  Voice rest prn  Strict anti GERD measures should help; literature provided; PPI use encouraged prn  Hydration encouraged ( to thin secretions)   Call for any significant change in status  Mucinex DM for cough prn
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
## 2017-03-23 NOTE — LETTER
March 25, 2017      Wesly Parr MD  [address]  [facility] [address]           [facility]  [address]  [place] [address]  Phone: [phone]  Fax: [phone]          Patient: Andra Matamoros   MR Number: [id number]   YOB: 1950   Date of Visit: 3/23/2017       Dear Dr. Wesly Parr:    Thank you for referring Andra Matamoros to me for evaluation. Attached you will find relevant portions of my assessment and plan of care.    If you have questions, please do not hesitate to call me. I look forward to following Andra Matamoros along with you.    Sincerely,    Brian Everett III, MD    Enclosure  CC:  No Recipients    If you would like to receive this communication electronically, please contact [email or web address] or [phone] to request more information on [employer] Link access.    For providers and/or their staff who would like to refer a patient to [facility], please contact us through our one-stop-shop provider referral line, [facility], at [phone].    If you feel you have received this communication in error or would no longer like to receive these types of communications, please e-mail [email or web address]

## 2017-03-28 ENCOUNTER — TELEPHONE (OUTPATIENT)
Dept: HEPATOLOGY | Facility: CLINIC | Age: 67
End: 2017-03-28

## 2017-03-28 NOTE — TELEPHONE ENCOUNTER
MA spoke with pt ABD U/S schedule for 03/29/2017. Pt requesting biopsy to be done on 03/31/2017. Will call U/S schedulinig after u/s and labs are done to see if approval can be obtained for 03/31/2017.

## 2017-03-28 NOTE — TELEPHONE ENCOUNTER
----- Message from Annetta Avila sent at 3/28/2017  4:34 PM CDT -----  Hepat pt  ----- Message -----     From: Brisa Caro     Sent: 3/28/2017   4:05 PM       To: [facility] Liver Referral Pool    Patient calling to see if she can schedule her US tomorrow morning, please call

## 2017-03-29 ENCOUNTER — HOSPITAL ENCOUNTER (OUTPATIENT)
Dept: RADIOLOGY | Facility: HOSPITAL | Age: 67
Discharge: HOME | End: 2017-03-29
Attending: INTERNAL MEDICINE
Payer: MEDICARE

## 2017-03-29 DIAGNOSIS — R74.8 ELEVATED ALKALINE PHOSPHATASE LEVEL: ICD-10-CM

## 2017-03-29 PROCEDURE — 76700 US EXAM ABDOM COMPLETE: CPT | Mod: 26,59,, | Performed by: RADIOLOGY

## 2017-03-29 PROCEDURE — 93975 VASCULAR STUDY: CPT | Mod: 26,,, | Performed by: RADIOLOGY

## 2017-03-29 PROCEDURE — 93975 VASCULAR STUDY: CPT | Mod: TC

## 2017-03-30 ENCOUNTER — TELEPHONE (OUTPATIENT)
Dept: HEPATOLOGY | Facility: CLINIC | Age: 67
End: 2017-03-30

## 2017-03-30 DIAGNOSIS — R74.8 ELEVATED ALKALINE PHOSPHATASE LEVEL: Primary | ICD-10-CM

## 2017-03-30 NOTE — TELEPHONE ENCOUNTER
Received a call from Radiology. The date requested by the patient for the U/S Guided Liver Biopsy for Fri 3/31/2017 was granted.  DOSC for 7:30 am and Procedure at 9:30 am.    Patient called at home. Date and time was acceptable. Pre and Post procedure teaching reinforced.  Nothing to eat or drink after 12 midnight tonight. Patient states she is not on any medications or Blood Thinners. You must have someone to drive you home. Report to the 2nd floor [facility]. Pt verbalized understanding and agreed.    Radiology called and the Appt was confirmed.  DOSC Completed. DB

## 2017-03-30 NOTE — TELEPHONE ENCOUNTER
----- Message from Yung Wolf sent at 3/30/2017  8:18 AM CDT -----  Contact: patient  Need to know if she's scheduled on tomorrow Nette please call

## 2017-03-31 ENCOUNTER — HOSPITAL ENCOUNTER (OUTPATIENT)
Facility: HOSPITAL | Age: 67
Discharge: HOME | End: 2017-03-31
Attending: INTERNAL MEDICINE | Admitting: INTERNAL MEDICINE
Payer: MEDICARE

## 2017-03-31 ENCOUNTER — SURGERY (OUTPATIENT)
Age: 67
End: 2017-03-31

## 2017-03-31 VITALS
DIASTOLIC BLOOD PRESSURE: 56 MMHG | SYSTOLIC BLOOD PRESSURE: 106 MMHG | HEIGHT: 64 IN | HEART RATE: 97 BPM | WEIGHT: 145 LBS | RESPIRATION RATE: 16 BRPM | BODY MASS INDEX: 24.75 KG/M2 | OXYGEN SATURATION: 97 % | TEMPERATURE: 98 F

## 2017-03-31 DIAGNOSIS — R74.8 ELEVATED ALKALINE PHOSPHATASE LEVEL: ICD-10-CM

## 2017-03-31 DIAGNOSIS — R74.8 ELEVATED LIVER ENZYMES: ICD-10-CM

## 2017-03-31 PROCEDURE — 25000003 PHARM REV CODE 250: Performed by: INTERNAL MEDICINE

## 2017-03-31 PROCEDURE — 25000003 PHARM REV CODE 250: Performed by: STUDENT IN AN ORGANIZED HEALTH CARE EDUCATION/TRAINING PROGRAM

## 2017-03-31 PROCEDURE — 63600175 PHARM REV CODE 636 W HCPCS: Performed by: RADIOLOGY

## 2017-03-31 RX ORDER — LIDOCAINE HYDROCHLORIDE 10 MG/ML
1 INJECTION, SOLUTION EPIDURAL; INFILTRATION; INTRACAUDAL; PERINEURAL ONCE
Status: COMPLETED | OUTPATIENT
Start: 2017-03-31 | End: 2017-03-31

## 2017-03-31 RX ORDER — MIDAZOLAM HYDROCHLORIDE 1 MG/ML
INJECTION, SOLUTION INTRAMUSCULAR; INTRAVENOUS CODE/TRAUMA/SEDATION MEDICATION
Status: COMPLETED | OUTPATIENT
Start: 2017-03-31 | End: 2017-03-31

## 2017-03-31 RX ORDER — FENTANYL CITRATE 50 UG/ML
INJECTION, SOLUTION INTRAMUSCULAR; INTRAVENOUS CODE/TRAUMA/SEDATION MEDICATION
Status: COMPLETED | OUTPATIENT
Start: 2017-03-31 | End: 2017-03-31

## 2017-03-31 RX ORDER — SODIUM CHLORIDE 9 MG/ML
INJECTION, SOLUTION INTRAVENOUS CONTINUOUS
Status: DISCONTINUED | OUTPATIENT
Start: 2017-03-31 | End: 2017-03-31 | Stop reason: HOSPADM

## 2017-03-31 RX ADMIN — MIDAZOLAM HYDROCHLORIDE 0.5 MG: 1 INJECTION, SOLUTION INTRAMUSCULAR; INTRAVENOUS at 10:03

## 2017-03-31 RX ADMIN — LIDOCAINE HYDROCHLORIDE 0.2 MG: 10 INJECTION, SOLUTION EPIDURAL; INFILTRATION; INTRACAUDAL; PERINEURAL at 09:03

## 2017-03-31 RX ADMIN — SODIUM CHLORIDE 500 ML: 0.9 INJECTION, SOLUTION INTRAVENOUS at 10:03

## 2017-03-31 RX ADMIN — FENTANYL CITRATE 25 MCG: 50 INJECTION, SOLUTION INTRAMUSCULAR; INTRAVENOUS at 10:03

## 2017-03-31 NOTE — DISCHARGE INSTRUCTIONS
Discharge Instructions for Liver Biopsy  You had a procedure called liver biopsy. A healthcare provider used a special needle to remove a small piece of tissue from your liver. Then it was examined for signs of damage or disease. A liver biopsy is ordered after other tests have shown that your liver is not working properly. You may also have a liver biopsy when liver disease is suspected, to determine whether there is too much iron in the liver, or to rule out cancer.  Home care  Recommendations include the following:   · Because you had anesthesia, you should not drive until the day after your biopsy.   · Remove the bandage covering the biopsy site 48 hours after the procedure.  · Rest for 6 hours and take it easy when you arrive home.  · Dont shower for 24 hours after the biopsy. If you wish, you may wash yourself with a sponge or washcloth. When you are able to shower, dont scrub the site. Gently wash the area and pat it dry.  · Dont lift anything heavier than 10 pounds for up to 1 week after the procedure, or as advised by your healthcare provider.  · Don't do strenuous activities or exercises for up to 1 week after the procedure.  · Ask your healthcare provider when you can return to work.  · Do not start taking blood thinners without clear instructions from your healthcare provider.  Follow-up care  Make a follow-up appointment as directed by our staff.     When to call your healthcare provider  Call your healthcare provider immediately if you have any of the following:  · Bleeding from the biopsy site  · Dizziness or lightheadedness  · Sudden or increased shortness of breath  · Sudden chest pain  · Fever of 100.4°F (38.0°C) or higher, or as directed by your healthcare provider  · Shaking chills  · Yellow eyes or skin  · Increasing redness, tenderness, or swelling at the biopsy site  · Drainage from the biopsy site  · Opening of the biopsy site  · Vomiting blood  · Rectal bleeding or bloody  stools  · Increasing pain, with or without activity, in the liver or belly area, or pain shooting to the right shoulder   Date Last Reviewed: 7/1/2016  © 7350-1275 [employer]. [address] [place] [address], [place] [address]. All rights reserved. This information is not intended as a substitute for professional medical care. Always follow your healthcare professional's instructions.

## 2017-03-31 NOTE — PROGRESS NOTES
Liver Bx procedure is now complete. Pt tolerated procedure well.Dressing to right abd  Is clean dry and intact. Sandbag to remain in place for 1 hour  Until 12:05. Vss/ NAD. Pt to recovery in [facility] for 4 hours.

## 2017-03-31 NOTE — H&P
Radiology History & Physical      SUBJECTIVE:     Chief Complaint: elevated alkaline phosphatase    History of Present Illness:  Andra Matamoros is a 66 y.o. female who presents for liver biopsy.  No past medical history on file.  Past Surgical History:   Procedure Laterality Date    CHOLECYSTECTOMY         Home Meds:   Prior to Admission medications    Not on File     Anticoagulants/Antiplatelets: no anticoagulation    Allergies: Review of patient's allergies indicates:  No Known Allergies  Sedation History:  no adverse reactions    Review of Systems:   Hematological: no known coagulopathies  Respiratory: no shortness of breath  Cardiovascular: no chest pain  Gastrointestinal: no abdominal pain  Genito-Urinary: no dysuria  Musculoskeletal: negative  Neurological: no TIA or stroke symptoms         OBJECTIVE:     Vital Signs (Most Recent)       Physical Exam:  ASA: 2  Mallampati: 2    General: no acute distress  Mental Status: alert and oriented to person, place and time  HEENT: normocephalic, atraumatic  Chest: unlabored breathing  Heart: regular heart rate  Abdomen: nondistended  Extremity: moves all extremities    Laboratory  Lab Results   Component Value Date    INR 0.9 03/29/2017       Lab Results   Component Value Date    WBC 9.48 03/29/2017    HGB 13.1 03/29/2017    HCT 40.8 03/29/2017    MCV 89 03/29/2017     (H) 03/29/2017      Lab Results   Component Value Date    GLU 96 03/29/2017     03/29/2017    K 4.2 03/29/2017     03/29/2017    CO2 27 03/29/2017    BUN 16 03/29/2017    CREATININE 0.7 03/29/2017    CALCIUM 9.9 03/29/2017    ALT 22 03/29/2017    AST 28 03/29/2017    ALBUMIN 3.8 03/29/2017    BILITOT 0.5 03/29/2017    BILIDIR 0.5 (H) 07/21/2010       ASSESSMENT/PLAN:     Sedation Plan: Moderate sedation with fentanyl and versed; local anesthesia with lidocaine    Patient will undergo liver biopsy.    Dalton Mederos MD  Resident  Department of Radiology  Pager: [phone]

## 2017-03-31 NOTE — PROGRESS NOTES
Discharge instructions reviewed with patient and family before family left. All questions answered.  Family verbalized understanding and able to teach back.  Patient continues to say she wants to be active this week.  Re-educated her on the importance of lifting restrictions and activity restrictions for the next week.  Verbalized understanding.

## 2017-03-31 NOTE — PROCEDURES
Radiology Post-Procedure Note    Pre Op Diagnosis: Abnormal LFTs    Post Op Diagnosis: Same    Procedure: Ultrasound guided liver biopsy    Procedure performed by: Wayne Avila MD; Dalton Mederos MD (resident)    Written Informed Consent Obtained: Yes    Specimen Removed: Yes: Single 16 gauge core biopsy of liver    Estimated Blood Loss: Minimal    Findings: Moderate sedation and local anesthesia were used.    A 16-gauge Monopty biopsy device was used to remove 1 random right hepatic lobe specimen.  This specimen was sent to pathology for further evaluation.      The patient tolerated the procedure well and there were no complications.  Please see Imaging report for further details.      Dalton Mederos MD  Resident  Department of Radiology  Pager: [phone]

## 2017-03-31 NOTE — PROGRESS NOTES
Spoke to Vicenta in [place] and informed her that the patient was ready. She stated patient will be consented in [facility].

## 2017-03-31 NOTE — DISCHARGE SUMMARY
Radiology Discharge Summary      Hospital Course: No complications    Admit Date: 3/31/2017  Discharge Date: 03/31/2017     Instructions Given to Patient: Yes  Diet: Resume prior diet  Activity: activity as tolerated    Description of Condition on Discharge: Stable  Vital Signs (Most Recent): Pulse: 74 (03/31/17 1057)  Resp: 10 (03/31/17 1057)  BP: (!) 91/55 (03/31/17 1057)  SpO2: 95 % (03/31/17 1057)    Discharge Disposition: Home    Discharge Diagnosis: elevated LFTs    Follow up: Follow up with ordering physician for results. Ordering physician and results will dictate further follow up.      Dalton Mederos MD  Resident  Department of Radiology  Pager: [phone]

## 2017-03-31 NOTE — IP AVS SNAPSHOT
[facility]  [address]  [facility] [address]  Phone: [phone]           Patient Discharge Instructions   Our goal is to set you up for success. This packet includes information on your condition, medications, and your home care.  It will help you care for yourself to prevent having to return to the hospital.     Please ask your nurse if you have any questions.      There are many details to remember when preparing to leave the hospital. Here is what you will need to do:    1. Take your medicine. If you are prescribed medications, review your Medication List on the following pages. You may have new medications to  at the pharmacy and others that you'll need to stop taking. Review the instructions for how and when to take your medications. Talk with your doctor or nurses if you are unsure of what to do.     2. Go to your follow-up appointments. Specific follow-up information is listed in the following pages. Your may be contacted by a nurse or clinical provider about future appointments. Be sure we have all of the phone numbers to reach you. Please contact your provider's office if you are unable to make an appointment.     3. Watch for warning signs. Your doctor or nurse will give you detailed warning signs to watch for and when to call for assistance. These instructions may also include educational information about your condition. If you experience any of warning signs to your health, call your doctor.           [facility]  Unless otherwise directed by your provider, please   contact [facility], our nurse care line   that is available for 24/7 assistance.     [phone] (toll-free)     Registered nurses in the [facility]   provide: appointment scheduling, clinical advisement, health education, and other advisory services.                  ** Verify the list of medication(s) below is accurate and up to date. Carry this with you in case of  emergency. If your medications have changed, please notify your healthcare provider.             Medication List      Notice     You have not been prescribed any medications.               Please bring to all follow up appointments:    1. A copy of your discharge instructions.  2. All medicines you are currently taking in their original bottles.  3. Identification and insurance card.    Please arrive 15 minutes ahead of scheduled appointment time.    Please call 24 hours in advance if you must reschedule your appointment and/or time.            Discharge Instructions         Discharge Instructions for Liver Biopsy  You had a procedure called liver biopsy. A healthcare provider used a special needle to remove a small piece of tissue from your liver. Then it was examined for signs of damage or disease. A liver biopsy is ordered after other tests have shown that your liver is not working properly. You may also have a liver biopsy when liver disease is suspected, to determine whether there is too much iron in the liver, or to rule out cancer.  Home care  Recommendations include the following:   · Because you had anesthesia, you should not drive until the day after your biopsy.   · Remove the bandage covering the biopsy site 48 hours after the procedure.  · Rest for 6 hours and take it easy when you arrive home.  · Dont shower for 24 hours after the biopsy. If you wish, you may wash yourself with a sponge or washcloth. When you are able to shower, dont scrub the site. Gently wash the area and pat it dry.  · Dont lift anything heavier than 10 pounds for up to 1 week after the procedure, or as advised by your healthcare provider.  · Don't do strenuous activities or exercises for up to 1 week after the procedure.  · Ask your healthcare provider when you can return to work.  · Do not start taking blood thinners without clear instructions from your healthcare provider.  Follow-up care  Make a follow-up appointment as  "directed by our staff.     When to call your healthcare provider  Call your healthcare provider immediately if you have any of the following:  · Bleeding from the biopsy site  · Dizziness or lightheadedness  · Sudden or increased shortness of breath  · Sudden chest pain  · Fever of 100.4°F (38.0°C) or higher, or as directed by your healthcare provider  · Shaking chills  · Yellow eyes or skin  · Increasing redness, tenderness, or swelling at the biopsy site  · Drainage from the biopsy site  · Opening of the biopsy site  · Vomiting blood  · Rectal bleeding or bloody stools  · Increasing pain, with or without activity, in the liver or belly area, or pain shooting to the right shoulder   Date Last Reviewed: 7/1/2016  © 6157-2423 [employer]. [address] [place] [address], [place] [address]. All rights reserved. This information is not intended as a substitute for professional medical care. Always follow your healthcare professional's instructions.            Admission Information     Date & Time Provider Department CSN    3/31/2017  7:54 AM Dwanye Sanches MD [facility] [id number]      Care Providers     Provider Role Specialty Primary office phone    Dwayne Sanches MD Attending Provider Hepatology [phone]    [facility] Diagnostic Provider Surgeon  -- Number not on file      Your Vitals Were     BP Pulse Temp Resp Height Weight    105/64 74 97.5 °F (36.4 °C) (Temporal) 16 5' 4" (1.626 m) 65.8 kg (145 lb)    SpO2 BMI             99% 24.89 kg/m2         Recent Lab Values        7/16/2010 1/17/2017                        3:12 AM 10:20 AM          A1C 5.6 5.5          Comment for A1C at 10:20 AM on 1/17/2017:  According to ADA guidelines, hemoglobin A1C <7.0% represents  optimal control in non-pregnant diabetic patients.  Different  metrics may apply to specific populations.   Standards of Medical Care in Diabetes - 2016.  For the purpose of screening for the presence of diabetes:  <5.7%     " Consistent with the absence of diabetes  5.7-6.4%  Consistent with increasing risk for diabetes   (prediabetes)  >or=6.5%  Consistent with diabetes  Currently no consensus exists for use of hemoglobin A1C  for diagnosis of diabetes for children.        Allergies as of 3/31/2017     No Known Allergies      Advance Directives     An advance directive is a document which, in the event you are no longer able to make decisions for yourself, tells your healthcare team what kind of treatment you do or do not want to receive, or who you would like to make those decisions for you.  If you do not currently have an advance directive, [facility] encourages you to create one.  For more information call:  [phone]), [phone] ([phone]),  or log on to [email or web address].        Language Assistance Services     ATTENTION: Language assistance services are available, free of charge. Please call [phone].      ATENCIÓN: Si habla español, tiene a fontana disposición servicios gratuitos de asistencia lingüística. Llame al [phone].     CHÚ Ý: N?u b?n nói Ti?ng Vi?t, có các d?ch v? h? tr? ngôn ng? mi?n phí dành cho b?n. G?i s? [phone].         [facility] complies with applicable Federal civil rights laws and does not discriminate on the basis of race, color, national origin, age, disability, or sex.

## 2017-03-31 NOTE — PLAN OF CARE
Problem: Patient Care Overview  Goal: Plan of Care Review  Outcome: Outcome(s) achieved Date Met:  03/31/17  Discharge instructions given and explained to patient and family with verbalization of understanding all instructions. Explained next time and doses of each medication. Patients v/s stable, denies n/v and tolerating po, rates pain level tolerable, IV removed, and family at bedside for patient discharge home. Surgical consent in pt's chart at time of discharge.

## 2017-04-05 ENCOUNTER — TELEPHONE (OUTPATIENT)
Dept: HEPATOLOGY | Facility: CLINIC | Age: 67
End: 2017-04-05

## 2017-04-05 NOTE — TELEPHONE ENCOUNTER
Patient called back stated that she is bloated after she had liver biopsy. No fever no pain she just want to make sure that this is a normal reaction. Schedule patient to see Dr. Sanches for follow up visit after her liver biopsy mailed appt reminder to patient. NINO

## 2017-04-05 NOTE — TELEPHONE ENCOUNTER
MA attempted to call patient back. She is unable to reached left her VM to please give us a callback. NINO

## 2017-04-05 NOTE — TELEPHONE ENCOUNTER
----- Message from Corrine Valenzuela sent at 4/4/2017  4:28 PM CDT -----  Contact: pt   Says that she has been feeling bloated after biopsy; admits to canceling f/u appt, please call

## 2017-05-04 ENCOUNTER — OFFICE VISIT (OUTPATIENT)
Dept: HEPATOLOGY | Facility: CLINIC | Age: 67
End: 2017-05-04
Payer: MEDICARE

## 2017-05-04 VITALS
SYSTOLIC BLOOD PRESSURE: 111 MMHG | OXYGEN SATURATION: 98 % | WEIGHT: 139.56 LBS | RESPIRATION RATE: 20 BRPM | HEIGHT: 64 IN | DIASTOLIC BLOOD PRESSURE: 62 MMHG | TEMPERATURE: 97 F | BODY MASS INDEX: 23.82 KG/M2 | HEART RATE: 85 BPM

## 2017-05-04 DIAGNOSIS — R10.13 DYSPEPSIA: ICD-10-CM

## 2017-05-04 DIAGNOSIS — R74.8 ELEVATED ALKALINE PHOSPHATASE LEVEL: Primary | ICD-10-CM

## 2017-05-04 PROCEDURE — 99213 OFFICE O/P EST LOW 20 MIN: CPT | Mod: S$GLB,,, | Performed by: INTERNAL MEDICINE

## 2017-05-04 PROCEDURE — 99999 PR PBB SHADOW E&M-EST. PATIENT-LVL III: CPT | Mod: PBBFAC,,, | Performed by: INTERNAL MEDICINE

## 2017-05-04 PROCEDURE — 1160F RVW MEDS BY RX/DR IN RCRD: CPT | Mod: S$GLB,,, | Performed by: INTERNAL MEDICINE

## 2017-05-04 NOTE — PROGRESS NOTES
HEPATOLOGY FOLLOW UP    Referring Physician: Dr. COURTNEY Parr  Current Corresponding Physician: Dr. COURTNEY Parr    Andra Matamoros is here for follow up of Elevated Hepatic Enzymes      HPI  Andra Matamoros was seen today in followup to review the results of her biopsy.    This 66-year-old woman has an elevated alkaline phosphatase, which is   asymptomatic.  Fractionation of alkaline phosphatase suggested it is biliary in   origin.  There is no evidence of biliary obstruction.  She had a mildly elevated  antimitochondrial antibody.  We did a liver biopsy for diagnostic   purposes.  It showed no granulomas or significant inflammatory changes.  She had   some mild steatosis.  Today, she complains of early satiety and dyspepsia.      NG/HN  dd: 05/04/2017 09:19:17 (CDT)  td: 05/05/2017 05:45:10 (CDT)  Doc ID   #[id number]  Job ID #[id number]    CC:         No outpatient encounter prescriptions on file as of 5/4/2017.     No facility-administered encounter medications on file as of 5/4/2017.      Review of patient's allergies indicates:  No Known Allergies  History reviewed. No pertinent past medical history.    Review of Systems   Constitutional: Negative for appetite change, fatigue and unexpected weight change.   HENT: Negative for ear pain, hearing loss, sore throat and trouble swallowing.    Eyes: Negative for visual disturbance.   Respiratory: Negative for cough and shortness of breath.    Cardiovascular: Negative for chest pain and palpitations.   Gastrointestinal: Negative for abdominal pain, nausea and vomiting.        Early satiety, dyspepsia   Genitourinary: Negative for difficulty urinating and dysuria.   Musculoskeletal: Negative for arthralgias and back pain.   Skin: Negative for rash.   Neurological: Negative for tremors, seizures and headaches.   Psychiatric/Behavioral: Negative for agitation and decreased concentration.     Vitals:    05/04/17 0837   BP: 111/62   Pulse: 85   Resp: 20   Temp: 97.1 °F (36.2 °C)        Physical Exam   Constitutional: She is oriented to person, place, and time. She appears well-developed and well-nourished.   HENT:   Right Ear: External ear normal.   Left Ear: External ear normal.   Mouth/Throat: Oropharynx is clear and moist.   Eyes: No scleral icterus.   Cardiovascular: Normal rate, regular rhythm and normal heart sounds.  Exam reveals no gallop and no friction rub.    No murmur heard.  Pulmonary/Chest: Effort normal and breath sounds normal. No respiratory distress. She has no wheezes.   Abdominal: Soft. Bowel sounds are normal. She exhibits no distension and no mass. There is no tenderness.   Musculoskeletal: Normal range of motion. She exhibits no edema.   Lymphadenopathy:     She has no cervical adenopathy.   Neurological: She is alert and oriented to person, place, and time. She has normal strength.   Skin: Skin is warm, dry and intact. She is not diaphoretic. No pallor.       MELD-Na score: 6 at 3/29/2017  1:52 PM  MELD score: 6 at 3/29/2017  1:52 PM  Calculated from:  Serum Creatinine: 0.7 mg/dL (Rounded to 1) at 3/29/2017  1:52 PM  Serum Sodium: 141 mmol/L (Rounded to 137) at 3/29/2017  1:52 PM  Total Bilirubin: 0.5 mg/dL (Rounded to 1) at 3/29/2017  1:52 PM  INR(ratio): 0.9 (Rounded to 1) at 3/29/2017  1:52 PM  Age: 66 years    Lab Results   Component Value Date    GLU 96 03/29/2017    BUN 16 03/29/2017    CREATININE 0.7 03/29/2017    CALCIUM 9.9 03/29/2017     03/29/2017    K 4.2 03/29/2017     03/29/2017    PROT 8.3 03/29/2017    CO2 27 03/29/2017    ANIONGAP 10 03/29/2017    WBC 9.48 03/29/2017    RBC 4.60 03/29/2017    HGB 13.1 03/29/2017    HCT 40.8 03/29/2017    MCV 89 03/29/2017    MCH 28.5 03/29/2017    MCHC 32.1 03/29/2017     Lab Results   Component Value Date    RDW 13.0 03/29/2017     (H) 03/29/2017    MPV 9.8 03/29/2017    GRAN 7.5 03/29/2017    GRAN 78.7 (H) 03/29/2017    LYMPH 1.2 03/29/2017    LYMPH 13.1 (L) 03/29/2017    MONO 0.6 03/29/2017     MONO 6.4 03/29/2017    EOSINOPHIL 1.4 03/29/2017    BASOPHIL 0.2 03/29/2017    EOS 0.1 03/29/2017    BASO 0.02 03/29/2017    APTT 28.8 07/16/2010     (H) 07/18/2010    CHOL 180 01/17/2017    TRIG 78 01/17/2017    HDL 69 01/17/2017    CHOLHDL 38.3 01/17/2017    TOTALCHOLEST 2.6 01/17/2017    ALBUMIN 3.8 03/29/2017    BILIDIR 0.5 (H) 07/21/2010    AST 28 03/29/2017    ALT 22 03/29/2017    ALKPHOS 268 (H) 03/29/2017    LABPROT 10.2 03/29/2017    INR 0.9 03/29/2017       Assessment and Plan:  Patient Active Problem List   Diagnosis    Primary osteoarthritis of both hands    Elevated alkaline phosphatase level    Elevated liver enzymes     Andragalo Matamoros is a 66 y.o. female withElevated Hepatic Enzymes    Etiology of elevated ALP unclear.  Reviewed favorable liver biopsy results.  Will arrange EGD to evaluate dyspepsia and early satiety.

## 2017-05-11 ENCOUNTER — ANESTHESIA (OUTPATIENT)
Dept: ENDOSCOPY | Facility: HOSPITAL | Age: 67
End: 2017-05-11
Payer: MEDICARE

## 2017-05-11 ENCOUNTER — SURGERY (OUTPATIENT)
Age: 67
End: 2017-05-11

## 2017-05-11 ENCOUNTER — HOSPITAL ENCOUNTER (OUTPATIENT)
Facility: HOSPITAL | Age: 67
Discharge: HOME | End: 2017-05-11
Attending: INTERNAL MEDICINE | Admitting: INTERNAL MEDICINE
Payer: MEDICARE

## 2017-05-11 ENCOUNTER — ANESTHESIA EVENT (OUTPATIENT)
Dept: ENDOSCOPY | Facility: HOSPITAL | Age: 67
End: 2017-05-11
Payer: MEDICARE

## 2017-05-11 VITALS
HEART RATE: 87 BPM | OXYGEN SATURATION: 94 % | SYSTOLIC BLOOD PRESSURE: 112 MMHG | RESPIRATION RATE: 18 BRPM | TEMPERATURE: 98 F | WEIGHT: 130 LBS | BODY MASS INDEX: 22.2 KG/M2 | HEIGHT: 64 IN | DIASTOLIC BLOOD PRESSURE: 64 MMHG

## 2017-05-11 DIAGNOSIS — R10.13 DYSPEPSIA: Primary | ICD-10-CM

## 2017-05-11 PROCEDURE — 25000003 PHARM REV CODE 250: Performed by: NURSE ANESTHETIST, CERTIFIED REGISTERED

## 2017-05-11 PROCEDURE — 43239 EGD BIOPSY SINGLE/MULTIPLE: CPT | Performed by: INTERNAL MEDICINE

## 2017-05-11 PROCEDURE — 25000003 PHARM REV CODE 250: Performed by: INTERNAL MEDICINE

## 2017-05-11 PROCEDURE — D9220A PRA ANESTHESIA: Mod: ANES,,, | Performed by: ANESTHESIOLOGY

## 2017-05-11 PROCEDURE — 27201012 HC FORCEPS, HOT/COLD, DISP: Performed by: INTERNAL MEDICINE

## 2017-05-11 PROCEDURE — 88305 TISSUE EXAM BY PATHOLOGIST: CPT

## 2017-05-11 PROCEDURE — 63600175 PHARM REV CODE 636 W HCPCS: Performed by: NURSE ANESTHETIST, CERTIFIED REGISTERED

## 2017-05-11 PROCEDURE — 37000008 HC ANESTHESIA 1ST 15 MINUTES: Performed by: INTERNAL MEDICINE

## 2017-05-11 PROCEDURE — D9220A PRA ANESTHESIA: Mod: CRNA,,, | Performed by: NURSE ANESTHETIST, CERTIFIED REGISTERED

## 2017-05-11 PROCEDURE — 43239 EGD BIOPSY SINGLE/MULTIPLE: CPT | Mod: ,,, | Performed by: INTERNAL MEDICINE

## 2017-05-11 PROCEDURE — 37000009 HC ANESTHESIA EA ADD 15 MINS: Performed by: INTERNAL MEDICINE

## 2017-05-11 PROCEDURE — 88305 TISSUE EXAM BY PATHOLOGIST: CPT | Mod: 26,,,

## 2017-05-11 RX ORDER — PROPOFOL 10 MG/ML
VIAL (ML) INTRAVENOUS CONTINUOUS PRN
Status: DISCONTINUED | OUTPATIENT
Start: 2017-05-11 | End: 2017-05-11

## 2017-05-11 RX ORDER — GLYCOPYRROLATE 0.2 MG/ML
INJECTION INTRAMUSCULAR; INTRAVENOUS
Status: DISCONTINUED | OUTPATIENT
Start: 2017-05-11 | End: 2017-05-11

## 2017-05-11 RX ORDER — SODIUM CHLORIDE 9 MG/ML
INJECTION, SOLUTION INTRAVENOUS CONTINUOUS
Status: DISCONTINUED | OUTPATIENT
Start: 2017-05-11 | End: 2017-05-11 | Stop reason: HOSPADM

## 2017-05-11 RX ORDER — LIDOCAINE HCL/PF 100 MG/5ML
SYRINGE (ML) INTRAVENOUS
Status: DISCONTINUED | OUTPATIENT
Start: 2017-05-11 | End: 2017-05-11

## 2017-05-11 RX ADMIN — SODIUM CHLORIDE: 0.9 INJECTION, SOLUTION INTRAVENOUS at 12:05

## 2017-05-11 RX ADMIN — LIDOCAINE HYDROCHLORIDE 100 MG: 20 INJECTION, SOLUTION INTRAVENOUS at 01:05

## 2017-05-11 RX ADMIN — PROPOFOL 125 MCG/KG/MIN: 10 INJECTION, EMULSION INTRAVENOUS at 01:05

## 2017-05-11 RX ADMIN — GLYCOPYRROLATE 0.2 MG: 0.2 INJECTION, SOLUTION INTRAMUSCULAR; INTRAVENOUS at 12:05

## 2017-05-11 NOTE — IP AVS SNAPSHOT
[facility]  [address]  [facility] [address]  Phone: [phone]           Patient Discharge Instructions   Our goal is to set you up for success. This packet includes information on your condition, medications, and your home care.  It will help you care for yourself to prevent having to return to the hospital.     Please ask your nurse if you have any questions.      There are many details to remember when preparing to leave the hospital. Here is what you will need to do:    1. Take your medicine. If you are prescribed medications, review your Medication List on the following pages. You may have new medications to  at the pharmacy and others that you'll need to stop taking. Review the instructions for how and when to take your medications. Talk with your doctor or nurses if you are unsure of what to do.     2. Go to your follow-up appointments. Specific follow-up information is listed in the following pages. Your may be contacted by a nurse or clinical provider about future appointments. Be sure we have all of the phone numbers to reach you. Please contact your provider's office if you are unable to make an appointment.     3. Watch for warning signs. Your doctor or nurse will give you detailed warning signs to watch for and when to call for assistance. These instructions may also include educational information about your condition. If you experience any of warning signs to your health, call your doctor.           [facility]  Unless otherwise directed by your provider, please   contact [facility], our nurse care line   that is available for 24/7 assistance.     [phone] (toll-free)     Registered nurses in the [facility]   provide: appointment scheduling, clinical advisement, health education, and other advisory services.                  ** Verify the list of medication(s) below is accurate and up to date. Carry this with you in case of  emergency. If your medications have changed, please notify your healthcare provider.             Medication List      Notice     You have not been prescribed any medications.               Please bring to all follow up appointments:    1. A copy of your discharge instructions.  2. All medicines you are currently taking in their original bottles.  3. Identification and insurance card.    Please arrive 15 minutes ahead of scheduled appointment time.    Please call 24 hours in advance if you must reschedule your appointment and/or time.        Your Scheduled Appointments     Jun 07, 2017  1:30 PM CDT   Established Patient Visit with MD [facility]all [facility] ([facility] )    [address]  [place] [id number] [phone]              Follow-up Information     Follow up with Wesly Parr MD.    Specialty:  Internal Medicine    Contact information:    [address]  [facility] [address]  [phone]          Discharge Instructions     Future Orders    Diet general     Questions:    Total calories:      Fat restriction, if any:      Protein restriction, if any:      Na restriction, if any:      Fluid restriction:      Additional restrictions:          Discharge Instructions         Upper GI Endoscopy     During endoscopy, a long, flexible tube is used to view the inside of your upper GI tract.      Upper GI endoscopy allows your healthcare provider to look directly into the beginning of your gastrointestinal (GI) tract. The esophagus, stomach, and duodenum (the first part of the small intestine) make up the upper GI tract.   Before the exam  Follow these and any other instructions you are given before your endoscopy. If you dont follow the healthcare providers instructions carefully, the test may need to be canceled or done over:  · Don't eat or drink anything after midnight the night before your exam. If your exam is in the afternoon, drink only clear  liquids in the morning. Don't eat or drink anything for 8 hours before the exam. In some cases, you may be able to take medicines with sips of water until 2 hours before the procedure. Speak with your healthcare provider about this.   · Bring your X-rays and any other test results you have.  · Because you will be sedated, arrange for an adult to drive you home after the exam.  · Tell your healthcare provider before the exam if you are taking any medicines or have any medical problems.  The procedure  Here is what to expect:  · You will lie on the endoscopy table. Usually patients lie on the left side.  · You will be monitored and given oxygen.  · Your throat may be numbed with a spray or gargle. You are given medicine through an intravenous (IV) line that will help you relax and remain comfortable. You may be awake or asleep during the procedure.  · The healthcare provider will put the endoscope in your mouth and down your esophagus. It is thinner than most pieces of food that you swallow. It will not affect your breathing. The medicine helps keep you from gagging.  · Air is put into your GI tract to expand it. It can make you burp.  · During the procedure, the healthcare provider can take biopsies (tissue samples), remove abnormalities, such as polyps, or treat abnormalities through a variety of devices placed through the endoscope. You will not feel this.   · The endoscope carries images of your upper GI tract to a video screen. If you are awake, you may be able to look at the images.  · After the procedure is done, you will rest for a time. An adult must drive you home.  When to call your healthcare provider  Contact your healthcare provider if you have:  · Black or tarry stools, or blood in your stool  · Fever  · Pain in your belly that does not go away  · Nausea and vomiting, or vomiting blood   Date Last Reviewed: 7/1/2016  © 9902-2951 [employer]. [address] [place] [address], [place] [address]. All  rights reserved. This information is not intended as a substitute for professional medical care. Always follow your healthcare professional's instructions.          Instructions    Discharge Summary/Instructions for after EGD with Biopsy  Patient Name: Andra Matamoros  Patient MRN: [id number]  Patient YOB: 1950  Thursday, May 11, 2017    Heriberto Ulrich MD  RESTRICTIONS ON ACTIVITY:  - DO NOT drive a car or operate machinery until the day after the   procedure.  - Following Day:  Return to full activities including work.  - Diet:  Eat and drink normally unless instructed otherwise.  TREATMENT FOR COMMON SIDE EFFECTS:  - Sore Throat - treat with throat lozenges, gargle with warm salt water.  - Mild abdominal pain & bloating - rest and take liquids only.  SYMPTOMS TO WATCH FOR AND REPORT TO YOUR PHYSICIAN:  1.  Chills or fever occurring within 24 hours after procedure.  2.  Pain in chest.  3.  SEVERE abdominal pain or bloating.  4.  Rectal bleeding which would show as maroon or black stools.  Your doctor recommends these additional instructions:  If any biopsies were performed, my office will call you in 5 to 6 business   days with any results.  - Patient has a contact number available for emergencies.  The signs and   symptoms of potential delayed complications were discussed with the   patient.  Return to normal activities tomorrow.  Written discharge   instructions were provided to the patient.   - Discharge patient to home.   - Resume previous diet.   - Continue present medications.   - Await pathology results.   You have a contact number available for emergencies.  The signs and symptoms   of potential delayed complications were discussed with you.  You may return   to normal activities tomorrow.  Written discharge instructions were   provided to you.   You are being discharged to home.   Resume your previous diet.   Continue your present medications.   We are waiting for your pathology  "results.  None  If you have any questions or problems, please call your physician.  EMERGENCY PHONE NUMBER: [phone]  LAB RESULTS: [phone]         Heriberto Ulrich MD  5/11/2017 1:32:03 PM  This report has been verified and signed electronically.         Admission Information     Date & Time Provider Department CSN    5/11/2017 11:46 AM Heriberto Ulrich MD [facility] [id number]      Care Providers     Provider Role Specialty Primary office phone    Heriberto Ulrich MD Attending Provider Gastroenterology [phone]    Heriberto Ulrich MD Surgeon  Gastroenterology [phone]      Your Vitals Were     BP Pulse Temp Resp Height Weight    106/69 (BP Location: Left arm, Patient Position: Lying, BP Method: Automatic) 91 98.2 °F (36.8 °C) (Axillary) 20 5' 4" (1.626 m) 59 kg (130 lb)    SpO2 BMI             99% 22.31 kg/m2         Recent Lab Values        7/16/2010 1/17/2017                        3:12 AM 10:20 AM          A1C 5.6 5.5          Comment for A1C at 10:20 AM on 1/17/2017:  According to ADA guidelines, hemoglobin A1C <7.0% represents  optimal control in non-pregnant diabetic patients.  Different  metrics may apply to specific populations.   Standards of Medical Care in Diabetes - 2016.  For the purpose of screening for the presence of diabetes:  <5.7%     Consistent with the absence of diabetes  5.7-6.4%  Consistent with increasing risk for diabetes   (prediabetes)  >or=6.5%  Consistent with diabetes  Currently no consensus exists for use of hemoglobin A1C  for diagnosis of diabetes for children.        Pending Labs     Order Current Status    Specimen to Pathology - Surgery Collected (05/11/17 9112)      Allergies as of 5/11/2017     No Known Allergies      Advance Directives     An advance directive is a document which, in the event you are no longer able to make decisions for yourself, tells your healthcare team what kind of treatment you do or do not want to receive, or " who you would like to make those decisions for you.  If you do not currently have an advance directive, [facility] encourages you to create one.  For more information call:  [phone] ([phone]),  or log on to [email or web address].        Language Assistance Services     ATTENTION: Language assistance services are available, free of charge. Please call [phone].      ATENCIÓN: Si habla español, tiene a fontana disposición servicios gratuitos de asistencia lingüística. Llame al [phone].     CHÚ Ý: N?u b?n nói Ti?ng Vi?t, có các d?ch v? h? tr? ngôn ng? mi?n phí dành cho b?n. G?i s? [phone].         [facility] complies with applicable Federal civil rights laws and does not discriminate on the basis of race, color, national origin, age, disability, or sex.

## 2017-05-11 NOTE — H&P (VIEW-ONLY)
HEPATOLOGY FOLLOW UP    Referring Physician: Dr. COURTNEY Parr  Current Corresponding Physician: Dr. COURTNEY Parr    Andra Matamoros is here for follow up of Elevated Hepatic Enzymes      HPI  Andra Matamoros was seen today in followup to review the results of her biopsy.    This 66-year-old woman has an elevated alkaline phosphatase, which is   asymptomatic.  Fractionation of alkaline phosphatase suggested it is biliary in   origin.  There is no evidence of biliary obstruction.  She had a mildly elevated  antimitochondrial antibody.  We did a liver biopsy for diagnostic   purposes.  It showed no granulomas or significant inflammatory changes.  She had   some mild steatosis.  Today, she complains of early satiety and dyspepsia.      NG/HN  dd: 05/04/2017 09:19:17 (CDT)  td: 05/05/2017 05:45:10 (CDT)  Doc ID   #[id number]  Job ID #[id number]    CC:         No outpatient encounter prescriptions on file as of 5/4/2017.     No facility-administered encounter medications on file as of 5/4/2017.      Review of patient's allergies indicates:  No Known Allergies  History reviewed. No pertinent past medical history.    Review of Systems   Constitutional: Negative for appetite change, fatigue and unexpected weight change.   HENT: Negative for ear pain, hearing loss, sore throat and trouble swallowing.    Eyes: Negative for visual disturbance.   Respiratory: Negative for cough and shortness of breath.    Cardiovascular: Negative for chest pain and palpitations.   Gastrointestinal: Negative for abdominal pain, nausea and vomiting.        Early satiety, dyspepsia   Genitourinary: Negative for difficulty urinating and dysuria.   Musculoskeletal: Negative for arthralgias and back pain.   Skin: Negative for rash.   Neurological: Negative for tremors, seizures and headaches.   Psychiatric/Behavioral: Negative for agitation and decreased concentration.     Vitals:    05/04/17 0837   BP: 111/62   Pulse: 85   Resp: 20   Temp: 97.1 °F (36.2 °C)        Physical Exam   Constitutional: She is oriented to person, place, and time. She appears well-developed and well-nourished.   HENT:   Right Ear: External ear normal.   Left Ear: External ear normal.   Mouth/Throat: Oropharynx is clear and moist.   Eyes: No scleral icterus.   Cardiovascular: Normal rate, regular rhythm and normal heart sounds.  Exam reveals no gallop and no friction rub.    No murmur heard.  Pulmonary/Chest: Effort normal and breath sounds normal. No respiratory distress. She has no wheezes.   Abdominal: Soft. Bowel sounds are normal. She exhibits no distension and no mass. There is no tenderness.   Musculoskeletal: Normal range of motion. She exhibits no edema.   Lymphadenopathy:     She has no cervical adenopathy.   Neurological: She is alert and oriented to person, place, and time. She has normal strength.   Skin: Skin is warm, dry and intact. She is not diaphoretic. No pallor.       MELD-Na score: 6 at 3/29/2017  1:52 PM  MELD score: 6 at 3/29/2017  1:52 PM  Calculated from:  Serum Creatinine: 0.7 mg/dL (Rounded to 1) at 3/29/2017  1:52 PM  Serum Sodium: 141 mmol/L (Rounded to 137) at 3/29/2017  1:52 PM  Total Bilirubin: 0.5 mg/dL (Rounded to 1) at 3/29/2017  1:52 PM  INR(ratio): 0.9 (Rounded to 1) at 3/29/2017  1:52 PM  Age: 66 years    Lab Results   Component Value Date    GLU 96 03/29/2017    BUN 16 03/29/2017    CREATININE 0.7 03/29/2017    CALCIUM 9.9 03/29/2017     03/29/2017    K 4.2 03/29/2017     03/29/2017    PROT 8.3 03/29/2017    CO2 27 03/29/2017    ANIONGAP 10 03/29/2017    WBC 9.48 03/29/2017    RBC 4.60 03/29/2017    HGB 13.1 03/29/2017    HCT 40.8 03/29/2017    MCV 89 03/29/2017    MCH 28.5 03/29/2017    MCHC 32.1 03/29/2017     Lab Results   Component Value Date    RDW 13.0 03/29/2017     (H) 03/29/2017    MPV 9.8 03/29/2017    GRAN 7.5 03/29/2017    GRAN 78.7 (H) 03/29/2017    LYMPH 1.2 03/29/2017    LYMPH 13.1 (L) 03/29/2017    MONO 0.6 03/29/2017     MONO 6.4 03/29/2017    EOSINOPHIL 1.4 03/29/2017    BASOPHIL 0.2 03/29/2017    EOS 0.1 03/29/2017    BASO 0.02 03/29/2017    APTT 28.8 07/16/2010     (H) 07/18/2010    CHOL 180 01/17/2017    TRIG 78 01/17/2017    HDL 69 01/17/2017    CHOLHDL 38.3 01/17/2017    TOTALCHOLEST 2.6 01/17/2017    ALBUMIN 3.8 03/29/2017    BILIDIR 0.5 (H) 07/21/2010    AST 28 03/29/2017    ALT 22 03/29/2017    ALKPHOS 268 (H) 03/29/2017    LABPROT 10.2 03/29/2017    INR 0.9 03/29/2017       Assessment and Plan:  Patient Active Problem List   Diagnosis    Primary osteoarthritis of both hands    Elevated alkaline phosphatase level    Elevated liver enzymes     Andragalo Matamoros is a 66 y.o. female withElevated Hepatic Enzymes    Etiology of elevated ALP unclear.  Reviewed favorable liver biopsy results.  Will arrange EGD to evaluate dyspepsia and early satiety.

## 2017-05-11 NOTE — DISCHARGE INSTRUCTIONS

## 2017-05-11 NOTE — TRANSFER OF CARE
"Anesthesia Transfer of Care Note    Patient: Andra Matamoros    Procedure(s) Performed: Procedure(s) (LRB):  ESOPHAGOGASTRODUODENOSCOPY (EGD) (Left)    Patient location: PACU    Anesthesia Type: general    Transport from OR: Transported from OR on 2-3 L/min O2 by NC with adequate spontaneous ventilation    Post pain: adequate analgesia    Post assessment: no apparent anesthetic complications    Post vital signs: stable    Level of consciousness: awake    Nausea/Vomiting: no nausea/vomiting    Complications: none    Transfer of care protocol was followed      Last vitals:   Visit Vitals    /68 (BP Location: Left arm, Patient Position: Sitting, BP Method: Automatic)    Pulse 81    Temp 37.1 °C (98.7 °F) (Oral)    Resp 12    Ht 5' 4" (1.626 m)    Wt 59 kg (130 lb)    SpO2 100%    Breastfeeding No    BMI 22.31 kg/m2     "

## 2017-05-11 NOTE — PATIENT INSTRUCTIONS
Discharge Summary/Instructions for after EGD with Biopsy  Patient Name: Andra Matamoros  Patient MRN: [id number]  Patient YOB: 1950  Thursday, May 11, 2017    Heriberto Ulrich MD  RESTRICTIONS ON ACTIVITY:  - DO NOT drive a car or operate machinery until the day after the   procedure.  - Following Day:  Return to full activities including work.  - Diet:  Eat and drink normally unless instructed otherwise.  TREATMENT FOR COMMON SIDE EFFECTS:  - Sore Throat - treat with throat lozenges, gargle with warm salt water.  - Mild abdominal pain & bloating - rest and take liquids only.  SYMPTOMS TO WATCH FOR AND REPORT TO YOUR PHYSICIAN:  1.  Chills or fever occurring within 24 hours after procedure.  2.  Pain in chest.  3.  SEVERE abdominal pain or bloating.  4.  Rectal bleeding which would show as maroon or black stools.  Your doctor recommends these additional instructions:  If any biopsies were performed, my office will call you in 5 to 6 business   days with any results.  - Patient has a contact number available for emergencies.  The signs and   symptoms of potential delayed complications were discussed with the   patient.  Return to normal activities tomorrow.  Written discharge   instructions were provided to the patient.   - Discharge patient to home.   - Resume previous diet.   - Continue present medications.   - Await pathology results.   You have a contact number available for emergencies.  The signs and symptoms   of potential delayed complications were discussed with you.  You may return   to normal activities tomorrow.  Written discharge instructions were   provided to you.   You are being discharged to home.   Resume your previous diet.   Continue your present medications.   We are waiting for your pathology results.  None  If you have any questions or problems, please call your physician.  EMERGENCY PHONE NUMBER: [phone]  LAB RESULTS: [phone]         Heriberto Ulrich MD  5/11/2017  1:32:03 PM  This report has been verified and signed electronically.

## 2017-05-11 NOTE — INTERVAL H&P NOTE
The patient has been examined and the H&P has been reviewed:    There is no interval changes since last encounter.  EGD: Dyspepsia, Early Satiety  Sedation: GA  ASA: Per anesthesia  Mallampati: Per anesthesia    Endoscopy risks, benefits and alternative options discussed and understood by patient/family.          Active Hospital Problems    Diagnosis  POA    Dyspepsia [R10.13]  Yes      Resolved Hospital Problems    Diagnosis Date Resolved POA   No resolved problems to display.

## 2017-05-11 NOTE — ANESTHESIA PREPROCEDURE EVALUATION
05/11/2017  Andra Matamoros is a 66 y.o., female.    Anesthesia Evaluation    I have reviewed the Patient Summary Reports.    I have reviewed the Nursing Notes.   I have reviewed the Medications.     Review of Systems  Anesthesia Hx:  Denies Hx of Anesthetic complications  History of prior surgery of interest to airway management or planning: Denies Family Hx of Anesthesia complications.  Personal Hx of Anesthesia complications, Post-Operative Nausea/Vomiting, in the past, but not with recent anesthetics / prophylaxis   Cardiovascular:  Cardiovascular Normal     Pulmonary:  Pulmonary Normal    Renal/:  Renal/ Normal     Hepatic/GI:  Hepatic/GI Normal Elevated LFTs   Neurological:  Neurology Normal        Physical Exam  General:  Well nourished    Airway/Jaw/Neck:  Airway Findings: Mouth Opening: Normal Tongue: Normal  General Airway Assessment: Adult  Mallampati: I  Improves to I with phonation.  TM Distance: Normal, at least 6 cm  Jaw/Neck Findings:  Micrognathia: Negative Neck ROM: Normal ROM      Dental:  Dental Findings: In tact   Chest/Lungs:  Chest/Lungs Findings: Clear to auscultation, Normal Respiratory Rate     Heart/Vascular:  Heart Findings: Rate: Normal  Rhythm: Regular Rhythm  Sounds: Normal  Heart murmur: negative    Abdomen:  Abdomen Findings:  Normal, Nontender, Soft       Mental Status:  Mental Status Findings:  Cooperative, Alert and Oriented         Anesthesia Plan  Type of Anesthesia, risks & benefits discussed:  Anesthesia Type:  MAC, general  Patient's Preference:   Intra-op Monitoring Plan:   Intra-op Monitoring Plan Comments:   Post Op Pain Control Plan:   Post Op Pain Control Plan Comments:   Induction:   IV  Beta Blocker:  Patient is not currently on a Beta-Blocker (No further documentation required).       Informed Consent: Patient understands risks and agrees with Anesthesia  plan.  Questions answered. Anesthesia consent signed with patient.  ASA Score: 1     Day of Surgery Review of History & Physical:    H&P update referred to the provider.         Ready For Surgery From Anesthesia Perspective.

## 2017-05-11 NOTE — ANESTHESIA RELEASE NOTE
"Anesthesia Release from PACU Note    Patient: Andra Matamoros    Procedure(s) Performed: Procedure(s) (LRB):  ESOPHAGOGASTRODUODENOSCOPY (EGD) (Left)    Anesthesia type: general    Post pain: Adequate analgesia    Post assessment: no apparent anesthetic complications, tolerated procedure well and no evidence of recall    Last Vitals:   Visit Vitals    /64 (BP Location: Left arm, Patient Position: Lying, BP Method: Automatic)    Pulse 87    Temp 36.8 °C (98.2 °F) (Axillary)    Resp 18    Ht 5' 4" (1.626 m)    Wt 59 kg (130 lb)    SpO2 (!) 94%    Breastfeeding No    BMI 22.31 kg/m2       Post vital signs: stable    Level of consciousness: awake, alert  and oriented    Nausea/Vomiting: no nausea/no vomiting    Complications: none    Airway Patency: patent    Respiratory: unassisted    Cardiovascular: stable and blood pressure at baseline    Hydration: euvolemic  "

## 2017-05-11 NOTE — ANESTHESIA POSTPROCEDURE EVALUATION
"Anesthesia Post Evaluation    Patient: Andra Matamoros    Procedure(s) Performed: Procedure(s) (LRB):  ESOPHAGOGASTRODUODENOSCOPY (EGD) (Left)    Final Anesthesia Type: general  Patient location during evaluation: GI PACU  Patient participation: Yes- Able to Participate  Level of consciousness: awake and alert  Post-procedure vital signs: reviewed and stable  Pain management: adequate  Airway patency: patent  PONV status at discharge: No PONV  Anesthetic complications: no      Cardiovascular status: stable  Respiratory status: unassisted and spontaneous ventilation  Hydration status: euvolemic  Follow-up not needed.        Visit Vitals    /64 (BP Location: Left arm, Patient Position: Lying, BP Method: Automatic)    Pulse 87    Temp 36.8 °C (98.2 °F) (Axillary)    Resp 18    Ht 5' 4" (1.626 m)    Wt 59 kg (130 lb)    SpO2 (!) 94%    Breastfeeding No    BMI 22.31 kg/m2       Pain/Darling Score: Pain Assessment Performed: Yes (5/11/2017  1:40 PM)  Presence of Pain: denies (5/11/2017  1:40 PM)  Pain Rating Prior to Med Admin: 0 (5/11/2017  1:40 PM)  Pain Rating Post Med Admin: 0 (5/11/2017 12:21 PM)  Darling Score: 10 (5/11/2017  1:40 PM)      "

## 2017-05-18 ENCOUNTER — TELEPHONE (OUTPATIENT)
Dept: ENDOSCOPY | Facility: HOSPITAL | Age: 67
End: 2017-05-18

## 2017-05-19 ENCOUNTER — TELEPHONE (OUTPATIENT)
Dept: GASTROENTEROLOGY | Facility: CLINIC | Age: 67
End: 2017-05-19

## 2017-05-19 NOTE — TELEPHONE ENCOUNTER
----- Message from Heriberto Ulrich MD sent at 5/19/2017  1:26 PM CDT -----  Biopsies were all normal.

## 2017-06-07 ENCOUNTER — HOSPITAL ENCOUNTER (OUTPATIENT)
Dept: VASCULAR SURGERY | Facility: CLINIC | Age: 67
Discharge: HOME | End: 2017-06-07
Attending: SURGERY
Payer: MEDICARE

## 2017-06-07 ENCOUNTER — INITIAL CONSULT (OUTPATIENT)
Dept: VASCULAR SURGERY | Facility: CLINIC | Age: 67
End: 2017-06-07
Payer: MEDICARE

## 2017-06-07 ENCOUNTER — OFFICE VISIT (OUTPATIENT)
Dept: INTERNAL MEDICINE | Facility: CLINIC | Age: 67
End: 2017-06-07
Payer: MEDICARE

## 2017-06-07 VITALS
WEIGHT: 139.13 LBS | SYSTOLIC BLOOD PRESSURE: 118 MMHG | BODY MASS INDEX: 23.88 KG/M2 | DIASTOLIC BLOOD PRESSURE: 70 MMHG | HEART RATE: 104 BPM

## 2017-06-07 DIAGNOSIS — R10.84 GENERALIZED ABDOMINAL PAIN: ICD-10-CM

## 2017-06-07 DIAGNOSIS — I80.9 PHLEBITIS: Primary | ICD-10-CM

## 2017-06-07 DIAGNOSIS — I80.01 THROMBOPHLEBITIS OF LEG, RIGHT, SUPERFICIAL: ICD-10-CM

## 2017-06-07 DIAGNOSIS — I80.9 PHLEBITIS: ICD-10-CM

## 2017-06-07 PROCEDURE — 93971 EXTREMITY STUDY: CPT | Mod: S$GLB,,, | Performed by: SURGERY

## 2017-06-07 PROCEDURE — 99205 OFFICE O/P NEW HI 60 MIN: CPT | Mod: S$GLB,,, | Performed by: SURGERY

## 2017-06-07 PROCEDURE — 99214 OFFICE O/P EST MOD 30 MIN: CPT | Mod: S$GLB,,, | Performed by: INTERNAL MEDICINE

## 2017-06-07 PROCEDURE — 99999 PR PBB SHADOW E&M-EST. PATIENT-LVL II: CPT | Mod: PBBFAC,,, | Performed by: SURGERY

## 2017-06-07 PROCEDURE — 99999 PR PBB SHADOW E&M-EST. PATIENT-LVL III: CPT | Mod: PBBFAC,,, | Performed by: INTERNAL MEDICINE

## 2017-06-07 NOTE — PROGRESS NOTES
Andra Matamoros  2017    HPI:  Patient is a 66 y.o. female referred from Dr. BONY Parr after presenting to his clinic complaining of about one month's worth of R leg pain and swelling.  She denies a history of hypercoagulability, smoking or hormonal supplementation. Her pain and inflammation are mainly localized to the proximal medial right thigh, though she describes intermittent shooting pain down right leg and associated right leg swelling.  She denies episodes of chest pain or shortness of breath or previous similar episodes. She has been spending more time than usual on long car rides to a vacation home.           History reviewed. No pertinent past medical history.  Past Surgical History:   Procedure Laterality Date     SECTION      CHOLECYSTECTOMY      TONSILLECTOMY       Family History   Problem Relation Age of Onset    Breast cancer Sister 60    Cancer Father     Vasculitis Mother     Paget's disease of bone Mother     No Known Problems Brother     No Known Problems Daughter     No Known Problems Son     Colon cancer Neg Hx     Ovarian cancer Neg Hx      Social History     Social History    Marital status:      Spouse name: N/A    Number of children: N/A    Years of education: N/A     Occupational History    Not on file.     Social History Main Topics    Smoking status: Never Smoker    Smokeless tobacco: Not on file    Alcohol use Yes      Comment: [place] periodically    Drug use: No    Sexual activity: Not Currently     Partners: Male     Birth control/ protection: Post-menopausal     Other Topics Concern    Not on file     Social History Narrative    No narrative on file     No current outpatient prescriptions on file prior to visit.     No current facility-administered medications on file prior to visit.        REVIEW OF SYSTEMS:  General: negative; ENT: negative; Allergy and Immunology: negative; Hematological and Lymphatic: Negative; Endocrine:  negative; Respiratory: no cough, shortness of breath, or wheezing; Cardiovascular: no chest pain or dyspnea on exertion; Gastrointestinal: no abdominal pain/back, change in bowel habits, or bloody stools; Genito-Urinary: no dysuria, trouble voiding, or hematuria; Musculoskeletal: negative  Neurological: no TIA or stroke symptoms    PHYSICAL EXAM:                General appearance:  Alert, well-appearing, and in no distress.  Oriented to person, place, and time   Neurological: Normal speech, no focal findings noted; CN II - XII grossly intact           Musculoskeletal: Digits/nail without cyanosis/clubbing.  Normal muscle strength/tone.                 Neck: Supple, no significant adenopathy; thyroid is not enlarged                  No carotid bruit can be auscultated                Chest:  Clear to auscultation, no wheezes, rales or rhonchi, symmetric air entry     No use of accessory muscles             Cardiac: Normal rate and regular rhythm, S1 and S2 normal; PMI non-displaced          Abdomen: Soft, nontender, nondistended, no masses or organomegaly     No rebound tenderness noted; bowel sounds normal     Pulsatile aortic mass is not palpable.     No groin adenopathy      Extremities:   2+ femoral pulses bilaterally     2+ Popliteal pulses; 2+ pedal pulses palpable.     No ulcerations     R proximal medial thigh: 18 cm inflamed, firm cord along course of GSV, mild R leg edema    LAB RESULTS:  Lab Results   Component Value Date    K 4.2 03/29/2017    K 4.3 02/14/2017    K 4.2 01/17/2017    CREATININE 0.7 03/29/2017    CREATININE 0.7 02/14/2017    CREATININE 0.7 01/17/2017     Lab Results   Component Value Date    WBC 9.48 03/29/2017    WBC 8.27 01/17/2017    WBC 7.77 09/09/2010    HCT 40.8 03/29/2017    HCT 40.2 01/17/2017    HCT 41.8 09/09/2010     (H) 03/29/2017     01/17/2017     09/09/2010     Lab Results   Component Value Date    HGBA1C 5.5 01/17/2017    HGBA1C 5.6 07/16/2010      IMAGING:  Urgent duplex obtained in vascular lab : occlusive thrombus throughout entire course of R GSV extending to saphenofemoral junction. No DVT.      IMP/PLAN:  66 y.o. female with a history of symptoms as noted above with thrombophlebitis of the R GSV with extensive clot burden and involvement of the saphenofemoral junction without presence of DVT.  Given the patient's age, her recurrent symptoms, the extensive clot burden, and the proximity to the saphenofemoral junction, systemic anticoagulation is warranted.  I described several anticoagulation options, including lovenox and coumadin, but recommended eliquis, which the patient wishes to pursue.  She has no history of excessive bleeding.     1) initiate systemic anticoagulation as noted  2) RTC in one month for surveillance RLE venous duplex  3) follow up with Dr. Parr as scheduled    Som De La Cruz MD  Vascular & Endovascular Surgery

## 2017-06-07 NOTE — PROGRESS NOTES
Subjective:       Patient ID: Andra Matamoros is a 66 y.o. female.    Chief Complaint: Follow-up; Abdominal Pain; and Leg Pain    HPIMiss Shilohh here today for follow up. She has hx of vague abdominal pain and bloatedness for last couple of months. She has hx of elevated alkaline phosphtase levels of liver/biliary origin. She was recently seen in Hepatology, underwent a liver biopsy which was negative. She report early satiety, fullness, but has no weight loss. She reported pain in her R ankle with some edema for the last month or so. She and her  have been travelling to their camp in [place] every 2 weeks. Long drive there and back. On exam of that area, she has evidence of phlebitis of the inner R thigh superficial vein with erythema, tenderness and hardness of the vessel. She reported pain to my palpation along the course of this vessel inferiorly up to the R ankle. Se denies SOB, CP, but will have her seen in Vascular Medicine for evaluation. I will also obtain blood work to kvng int this to include inflammatory markers and D-dimer level although DVT unlikely. I will also obtain CT of her abdomen/pelvis for evaluation. I jenn see her back once the studies are completed.  Review of Systems   All other systems reviewed and are negative.      Objective:      Physical Exam   Constitutional: She appears well-developed and well-nourished. No distress.   Neck: Normal range of motion. Neck supple. No JVD present. No thyromegaly present.   Cardiovascular: Normal rate, regular rhythm, normal heart sounds and intact distal pulses.    No murmur heard.  Phlebitis of R inner thigh vein as per HPI.   Pulmonary/Chest: Effort normal and breath sounds normal. No respiratory distress. She has no wheezes. She exhibits no tenderness.   Abdominal: Soft. Bowel sounds are normal. She exhibits no distension and no mass. There is tenderness.   Mild mid epigastric tenderness and tympani.   Musculoskeletal: Normal range of motion.  She exhibits edema.   Trace R ankle edema.   Lymphadenopathy:     She has no cervical adenopathy.   Skin: Skin is warm and dry. There is erythema.   R inner thigh as per HPI.   Psychiatric: She has a normal mood and affect. Her behavior is normal.   Nursing note and vitals reviewed.      Assessment:       1. Generalized abdominal pain    2. Phlebitis        Plan:    1. Obtain labs.         2. Refer to Vascular Medicine.         3. Obtain CT of abdomen/pelvis.         4. Consider trial of PPI.         5. RTC for review.

## 2017-06-07 NOTE — LETTER
June 7, 2017      Wesly Parr MD  [address]  [place] [id number]           [facility]  [address]  [place] [address]  Phone: [phone]  Fax: [phone]          Patient: Andra Matamoros   MR Number: [id number]   YOB: 1950   Date of Visit: 6/7/2017       Dear Dr. Wesly Parr:    Thank you for referring Andra Matamoros to me for evaluation. Attached you will find relevant portions of my assessment and plan of care.    If you have questions, please do not hesitate to call me. I look forward to following Andra Matamoros along with you.    Sincerely,    Som De La Cruz MD    Enclosure  CC:  No Recipients    If you would like to receive this communication electronically, please contact [email or web address] or [phone] to request more information on [employer] Link access.    For providers and/or their staff who would like to refer a patient to [facility], please contact us through our one-stop-shop provider referral line, [facility], at [phone].    If you feel you have received this communication in error or would no longer like to receive these types of communications, please e-mail [email or web address]

## 2017-06-08 ENCOUNTER — LAB VISIT (OUTPATIENT)
Dept: LAB | Facility: HOSPITAL | Age: 67
End: 2017-06-08
Attending: INTERNAL MEDICINE
Payer: MEDICARE

## 2017-06-08 DIAGNOSIS — R10.84 GENERALIZED ABDOMINAL PAIN: ICD-10-CM

## 2017-06-08 PROCEDURE — 87338 HPYLORI STOOL AG IA: CPT

## 2017-06-13 LAB — H PYLORI AG STL QL: NOT DETECTED

## 2017-06-21 ENCOUNTER — HOSPITAL ENCOUNTER (OUTPATIENT)
Dept: RADIOLOGY | Facility: HOSPITAL | Age: 67
Discharge: HOME | End: 2017-06-21
Attending: INTERNAL MEDICINE
Payer: MEDICARE

## 2017-06-21 DIAGNOSIS — R10.84 GENERALIZED ABDOMINAL PAIN: ICD-10-CM

## 2017-06-21 LAB
CREAT SERPL-MCNC: 0.7 MG/DL (ref 0.5–1.4)
SAMPLE: NORMAL

## 2017-06-21 PROCEDURE — 25500020 PHARM REV CODE 255: Performed by: INTERNAL MEDICINE

## 2017-06-21 PROCEDURE — 74177 CT ABD & PELVIS W/CONTRAST: CPT | Mod: 26,,, | Performed by: RADIOLOGY

## 2017-06-21 PROCEDURE — 74177 CT ABD & PELVIS W/CONTRAST: CPT | Mod: TC

## 2017-06-21 RX ADMIN — IOHEXOL 15 ML: 350 INJECTION, SOLUTION INTRAVENOUS at 11:06

## 2017-06-21 RX ADMIN — IOHEXOL 15 ML: 350 INJECTION, SOLUTION INTRAVENOUS at 10:06

## 2017-06-21 RX ADMIN — IOHEXOL 75 ML: 350 INJECTION, SOLUTION INTRAVENOUS at 12:06

## 2017-06-22 ENCOUNTER — OFFICE VISIT (OUTPATIENT)
Dept: HEPATOLOGY | Facility: CLINIC | Age: 67
End: 2017-06-22
Payer: MEDICARE

## 2017-06-22 VITALS
DIASTOLIC BLOOD PRESSURE: 66 MMHG | BODY MASS INDEX: 23.73 KG/M2 | TEMPERATURE: 96 F | HEIGHT: 64 IN | SYSTOLIC BLOOD PRESSURE: 125 MMHG | RESPIRATION RATE: 18 BRPM | WEIGHT: 139 LBS | HEART RATE: 88 BPM | OXYGEN SATURATION: 96 %

## 2017-06-22 DIAGNOSIS — R74.8 ELEVATED ALKALINE PHOSPHATASE LEVEL: Primary | ICD-10-CM

## 2017-06-22 DIAGNOSIS — R74.8 ELEVATED LIVER ENZYMES: ICD-10-CM

## 2017-06-22 PROCEDURE — 99213 OFFICE O/P EST LOW 20 MIN: CPT | Mod: S$GLB,,, | Performed by: INTERNAL MEDICINE

## 2017-06-22 PROCEDURE — 99999 PR PBB SHADOW E&M-EST. PATIENT-LVL III: CPT | Mod: PBBFAC,,, | Performed by: INTERNAL MEDICINE

## 2017-06-22 NOTE — PROGRESS NOTES
HEPATOLOGY FOLLOW UP    Referring Physician: Dr. COURTNEY Parr  Current Corresponding Physician: Dr. COURTNEY Parr    Andra Matamoros is here for follow up of Elevated Hepatic Enzymes      HPI  Andra Matamoros was seen in the Hepatology Clinic.  This 66-year-old woman had   originally been referred for elevated alkaline phosphatase and appear to be   biliary in nature.  Diagnostic evaluation including a liver biopsy has been   negative.  I had ordered an upper endoscopy to evaluate symptoms of bloating and   early satiety.  Duodenal and gastric biopsies were negative.  She continues to   have the symptoms.  For this reason, she underwent a CT scan yesterday.  The CT   scan showed some heterogeneous changes in the left hepatic lobe.  I will have   this reviewed at our Multidisciplinary Radiology Conference.      NG/HN  dd: 06/22/2017 10:59:38 (CDT)  td: 06/23/2017 03:14:47 (CDT)  Doc ID   #[id number]  Job ID #[id number]    CC:         Outpatient Encounter Prescriptions as of 6/22/2017   Medication Sig Dispense Refill    apixaban 5 mg Tab Take 1 tablet (5 mg total) by mouth 2 (two) times daily. 60 tablet 2     Facility-Administered Encounter Medications as of 6/22/2017   Medication Dose Route Frequency Provider Last Rate Last Dose    [COMPLETED] omnipaque 350 iohexol 15 mL  15 mL Oral ONCE PRN Wesly Parr MD   15 mL at 06/21/17 1130    [COMPLETED] omnipaque 350 iohexol 15 mL  15 mL Oral ONCE PRN Wesly Parr MD   15 mL at 06/21/17 1045    [COMPLETED] omnipaque 350 iohexol 75 mL  75 mL Intravenous ONCE PRN Wesly Parr MD   75 mL at 06/21/17 1245     Review of patient's allergies indicates:  No Known Allergies  History reviewed. No pertinent past medical history.    Review of Systems   Constitutional: Negative for appetite change, fatigue and unexpected weight change.   HENT: Negative for ear pain, hearing loss, sore throat and trouble swallowing.    Eyes: Negative for visual disturbance.    Respiratory: Negative for cough and shortness of breath.    Cardiovascular: Negative for chest pain and palpitations.   Gastrointestinal: Negative for abdominal pain, nausea and vomiting.        Early satiety, dyspepsia   Genitourinary: Negative for difficulty urinating and dysuria.   Musculoskeletal: Negative for arthralgias and back pain.   Skin: Negative for rash.   Neurological: Negative for tremors, seizures and headaches.   Psychiatric/Behavioral: Negative for agitation and decreased concentration.     Vitals:    06/22/17 1036   BP: 125/66   Pulse: 88   Resp: 18   Temp: 96.1 °F (35.6 °C)       Physical Exam   Constitutional: She is oriented to person, place, and time. She appears well-developed and well-nourished.   HENT:   Right Ear: External ear normal.   Left Ear: External ear normal.   Mouth/Throat: Oropharynx is clear and moist.   Eyes: No scleral icterus.   Cardiovascular: Normal rate, regular rhythm and normal heart sounds.  Exam reveals no gallop and no friction rub.    No murmur heard.  Pulmonary/Chest: Effort normal and breath sounds normal. No respiratory distress. She has no wheezes.   Abdominal: Soft. Bowel sounds are normal. She exhibits no distension and no mass. There is no tenderness.   Musculoskeletal: Normal range of motion. She exhibits no edema.   Lymphadenopathy:     She has no cervical adenopathy.   Neurological: She is alert and oriented to person, place, and time. She has normal strength.   Skin: Skin is warm, dry and intact. She is not diaphoretic. No pallor.       MELD-Na score: 6 at 3/29/2017  1:52 PM  MELD score: 6 at 3/29/2017  1:52 PM  Calculated from:  Serum Creatinine: 0.7 mg/dL (Rounded to 1) at 3/29/2017  1:52 PM  Serum Sodium: 141 mmol/L (Rounded to 137) at 3/29/2017  1:52 PM  Total Bilirubin: 0.5 mg/dL (Rounded to 1) at 3/29/2017  1:52 PM  INR(ratio): 0.9 (Rounded to 1) at 3/29/2017  1:52 PM  Age: 66 years    Lab Results   Component Value Date    GLU 96 03/29/2017    BUN  16 03/29/2017    CREATININE 0.7 03/29/2017    CALCIUM 9.9 03/29/2017     03/29/2017    K 4.2 03/29/2017     03/29/2017    PROT 8.3 03/29/2017    CO2 27 03/29/2017    ANIONGAP 10 03/29/2017    WBC 9.48 03/29/2017    RBC 4.60 03/29/2017    HGB 13.1 03/29/2017    HCT 40.8 03/29/2017    MCV 89 03/29/2017    MCH 28.5 03/29/2017    MCHC 32.1 03/29/2017     Lab Results   Component Value Date    RDW 13.0 03/29/2017     (H) 03/29/2017    MPV 9.8 03/29/2017    GRAN 7.5 03/29/2017    GRAN 78.7 (H) 03/29/2017    LYMPH 1.2 03/29/2017    LYMPH 13.1 (L) 03/29/2017    MONO 0.6 03/29/2017    MONO 6.4 03/29/2017    EOSINOPHIL 1.4 03/29/2017    BASOPHIL 0.2 03/29/2017    EOS 0.1 03/29/2017    BASO 0.02 03/29/2017    APTT 28.8 07/16/2010     (H) 07/18/2010    CHOL 180 01/17/2017    TRIG 78 01/17/2017    HDL 69 01/17/2017    CHOLHDL 38.3 01/17/2017    TOTALCHOLEST 2.6 01/17/2017    ALBUMIN 3.8 03/29/2017    BILIDIR 0.5 (H) 07/21/2010    AST 28 03/29/2017    ALT 22 03/29/2017    ALKPHOS 268 (H) 03/29/2017    LABPROT 10.2 03/29/2017    INR 0.9 03/29/2017       Assessment and Plan:  Patient Active Problem List   Diagnosis    Primary osteoarthritis of both hands    Elevated alkaline phosphatase level    Elevated liver enzymes    Dyspepsia    Phlebitis     Andra Matamoros is a 66 y.o. female withElevated Hepatic Enzymes    Etiology of elevated ALP unclear.  Will review CT scan at multi-disciplinary radiology conference.  I do not have explanation from a liver perspective as to cause of bloating early satiety.

## 2017-06-29 DIAGNOSIS — I80.9 PHLEBITIS: Primary | ICD-10-CM

## 2017-07-10 ENCOUNTER — OFFICE VISIT (OUTPATIENT)
Dept: VASCULAR SURGERY | Facility: CLINIC | Age: 67
End: 2017-07-10
Payer: MEDICARE

## 2017-07-10 ENCOUNTER — TELEPHONE (OUTPATIENT)
Dept: HEPATOLOGY | Facility: CLINIC | Age: 67
End: 2017-07-10

## 2017-07-10 ENCOUNTER — HOSPITAL ENCOUNTER (OUTPATIENT)
Dept: VASCULAR SURGERY | Facility: CLINIC | Age: 67
Discharge: HOME | End: 2017-07-10
Attending: SURGERY
Payer: MEDICARE

## 2017-07-10 VITALS
HEART RATE: 104 BPM | HEIGHT: 64 IN | DIASTOLIC BLOOD PRESSURE: 71 MMHG | BODY MASS INDEX: 22.53 KG/M2 | SYSTOLIC BLOOD PRESSURE: 111 MMHG | TEMPERATURE: 98 F | WEIGHT: 132 LBS

## 2017-07-10 DIAGNOSIS — I80.9 PHLEBITIS: ICD-10-CM

## 2017-07-10 DIAGNOSIS — I80.9 PHLEBITIS AND THROMBOPHLEBITIS OF UNSPECIFIED SITE: Primary | ICD-10-CM

## 2017-07-10 DIAGNOSIS — I80.01 SUPERFICIAL THROMBOPHLEBITIS OF RIGHT LEG: ICD-10-CM

## 2017-07-10 PROCEDURE — 99214 OFFICE O/P EST MOD 30 MIN: CPT | Mod: S$PBB,,, | Performed by: SURGERY

## 2017-07-10 PROCEDURE — 93971 EXTREMITY STUDY: CPT | Mod: S$GLB,,, | Performed by: SURGERY

## 2017-07-10 PROCEDURE — 99999 PR PBB SHADOW E&M-EST. PATIENT-LVL III: CPT | Mod: PBBFAC,,, | Performed by: SURGERY

## 2017-07-10 NOTE — TELEPHONE ENCOUNTER
Patient called asking for the results of her CT-Scan. Patient stated that she is been waiting for the results but nobody has called her.     Route message to MD

## 2017-07-11 ENCOUNTER — TELEPHONE (OUTPATIENT)
Dept: HEPATOLOGY | Facility: CLINIC | Age: 67
End: 2017-07-11

## 2017-07-11 ENCOUNTER — TELEPHONE (OUTPATIENT)
Dept: VASCULAR SURGERY | Facility: CLINIC | Age: 67
End: 2017-07-11

## 2017-07-11 DIAGNOSIS — I81 PORTAL VEIN THROMBOSIS: Primary | ICD-10-CM

## 2017-07-11 NOTE — TELEPHONE ENCOUNTER
Called and spoke with Rena in Hematology Clinic in regards to scheduling a patient consult.  Rena informed nurse, that she will forward information to the Nurse Navigator Kanu Amaya RN and she will call and set up appointment with patient.    Called and spoke with patient informed of date and time of scheduled appointment on 7/24/17 at 10:50 am and 11:00 am. Accepted appointment time. Appointment letters mailed.   Informed patient Kanu Amaya RN will give her a call to schedule her consult with Hematology.  Patient verbalizes understanding.

## 2017-07-11 NOTE — TELEPHONE ENCOUNTER
Spoke to  Pt , she wanted to let Dr. De La Cruz that it was ok per Dr. Church to keep taking the eliqus

## 2017-07-11 NOTE — TELEPHONE ENCOUNTER
----- Message from Tono Good sent at 7/11/2017 11:49 AM CDT -----  Patient states that she needs to speak with nurse in ref to Dr Sanches instructing her to take the blood thinners until her blood issues are resolved//please call back at [phone]//thank you

## 2017-07-11 NOTE — TELEPHONE ENCOUNTER
Called and spoke with patient, informed patient that Dr Sanches would like her to schedule MRI, labs and Consult for Hematology. Patient verbalizes understanding. Asked patient of preference with scheduling appointments.  Patient says she would like first available on all appointments dates after 9:00 am.  MRI and lab scheduled, appointment letters mailed .   Informed will call Hematology to schedule appointment and call her to confirm date and time.  Patient verbalizes understanding.

## 2017-07-12 ENCOUNTER — HOSPITAL ENCOUNTER (INPATIENT)
Facility: HOSPITAL | Age: 67
LOS: 2 days | Discharge: HOME | DRG: 176 | End: 2017-07-15
Attending: EMERGENCY MEDICINE | Admitting: EMERGENCY MEDICINE
Payer: MEDICARE

## 2017-07-12 ENCOUNTER — TELEPHONE (OUTPATIENT)
Dept: HEPATOLOGY | Facility: CLINIC | Age: 67
End: 2017-07-12

## 2017-07-12 DIAGNOSIS — R53.83 FATIGUE: ICD-10-CM

## 2017-07-12 DIAGNOSIS — I26.99 OTHER ACUTE PULMONARY EMBOLISM WITHOUT ACUTE COR PULMONALE: ICD-10-CM

## 2017-07-12 DIAGNOSIS — R74.8 ELEVATED LIVER ENZYMES: ICD-10-CM

## 2017-07-12 DIAGNOSIS — R74.8 ELEVATED ALKALINE PHOSPHATASE LEVEL: ICD-10-CM

## 2017-07-12 DIAGNOSIS — R16.0 LIVER MASS, LEFT LOBE: ICD-10-CM

## 2017-07-12 DIAGNOSIS — R97.8 ELEVATED TUMOR MARKERS: ICD-10-CM

## 2017-07-12 DIAGNOSIS — R79.89 ELEVATED TROPONIN: ICD-10-CM

## 2017-07-12 DIAGNOSIS — I21.4 NSTEMI (NON-ST ELEVATED MYOCARDIAL INFARCTION): Primary | ICD-10-CM

## 2017-07-12 DIAGNOSIS — R53.1 WEAKNESS: ICD-10-CM

## 2017-07-12 DIAGNOSIS — R74.01 ELEVATED TRANSAMINASE LEVEL: ICD-10-CM

## 2017-07-12 DIAGNOSIS — I24.89 DEMAND ISCHEMIA: ICD-10-CM

## 2017-07-12 LAB
ABO + RH BLD: NORMAL
ALBUMIN SERPL BCP-MCNC: 3.3 G/DL
ALP SERPL-CCNC: 564 U/L
ALT SERPL W/O P-5'-P-CCNC: 151 U/L
ANION GAP SERPL CALC-SCNC: 12 MMOL/L
APTT BLDCRRT: 21.3 SEC
AST SERPL-CCNC: 141 U/L
BACTERIA #/AREA URNS AUTO: NORMAL /HPF
BASOPHILS # BLD AUTO: 0.02 K/UL
BASOPHILS NFR BLD: 0.2 %
BILIRUB SERPL-MCNC: 0.5 MG/DL
BILIRUB UR QL STRIP: NEGATIVE
BLD GP AB SCN CELLS X3 SERPL QL: NORMAL
BNP SERPL-MCNC: 16 PG/ML
BUN SERPL-MCNC: 22 MG/DL
CALCIUM SERPL-MCNC: 9.2 MG/DL
CHLORIDE SERPL-SCNC: 103 MMOL/L
CLARITY UR REFRACT.AUTO: ABNORMAL
CO2 SERPL-SCNC: 21 MMOL/L
COLOR UR AUTO: YELLOW
CREAT SERPL-MCNC: 0.8 MG/DL
DIFFERENTIAL METHOD: ABNORMAL
EOSINOPHIL # BLD AUTO: 0.1 K/UL
EOSINOPHIL NFR BLD: 1.2 %
ERYTHROCYTE [DISTWIDTH] IN BLOOD BY AUTOMATED COUNT: 13.6 %
EST. GFR  (AFRICAN AMERICAN): >60 ML/MIN/1.73 M^2
EST. GFR  (NON AFRICAN AMERICAN): >60 ML/MIN/1.73 M^2
GLUCOSE SERPL-MCNC: 108 MG/DL
GLUCOSE UR QL STRIP: NEGATIVE
HCT VFR BLD AUTO: 36.3 %
HGB BLD-MCNC: 12 G/DL
HGB UR QL STRIP: NEGATIVE
INR PPP: 1.1
KETONES UR QL STRIP: NEGATIVE
LACTATE SERPL-SCNC: 0.8 MMOL/L
LEUKOCYTE ESTERASE UR QL STRIP: ABNORMAL
LIPASE SERPL-CCNC: 27 U/L
LYMPHOCYTES # BLD AUTO: 1 K/UL
LYMPHOCYTES NFR BLD: 9.4 %
MAGNESIUM SERPL-MCNC: 2.3 MG/DL
MCH RBC QN AUTO: 27.8 PG
MCHC RBC AUTO-ENTMCNC: 33.1 %
MCV RBC AUTO: 84 FL
MICROSCOPIC COMMENT: NORMAL
MONOCYTES # BLD AUTO: 0.9 K/UL
MONOCYTES NFR BLD: 8.1 %
NEUTROPHILS # BLD AUTO: 8.6 K/UL
NEUTROPHILS NFR BLD: 80.8 %
NITRITE UR QL STRIP: NEGATIVE
PH UR STRIP: 5 [PH] (ref 5–8)
PLATELET # BLD AUTO: 187 K/UL
PMV BLD AUTO: 9.8 FL
POTASSIUM SERPL-SCNC: 4.5 MMOL/L
PROT SERPL-MCNC: 7.7 G/DL
PROT UR QL STRIP: NEGATIVE
PROTHROMBIN TIME: 11.7 SEC
RBC # BLD AUTO: 4.32 M/UL
RBC #/AREA URNS AUTO: 1 /HPF (ref 0–4)
SODIUM SERPL-SCNC: 136 MMOL/L
SP GR UR STRIP: 1.02 (ref 1–1.03)
TROPONIN I SERPL DL<=0.01 NG/ML-MCNC: 0.73 NG/ML
TSH SERPL DL<=0.005 MIU/L-ACNC: 1.59 UIU/ML
URN SPEC COLLECT METH UR: ABNORMAL
UROBILINOGEN UR STRIP-ACNC: NEGATIVE EU/DL
WBC # BLD AUTO: 10.6 K/UL
WBC #/AREA URNS AUTO: 5 /HPF (ref 0–5)

## 2017-07-12 PROCEDURE — 84484 ASSAY OF TROPONIN QUANT: CPT | Mod: 91

## 2017-07-12 PROCEDURE — 83690 ASSAY OF LIPASE: CPT

## 2017-07-12 PROCEDURE — 81001 URINALYSIS AUTO W/SCOPE: CPT

## 2017-07-12 PROCEDURE — 83605 ASSAY OF LACTIC ACID: CPT

## 2017-07-12 PROCEDURE — 87040 BLOOD CULTURE FOR BACTERIA: CPT

## 2017-07-12 PROCEDURE — 36415 COLL VENOUS BLD VENIPUNCTURE: CPT

## 2017-07-12 PROCEDURE — 99285 EMERGENCY DEPT VISIT HI MDM: CPT | Mod: 25

## 2017-07-12 PROCEDURE — 83735 ASSAY OF MAGNESIUM: CPT

## 2017-07-12 PROCEDURE — 25000003 PHARM REV CODE 250: Performed by: STUDENT IN AN ORGANIZED HEALTH CARE EDUCATION/TRAINING PROGRAM

## 2017-07-12 PROCEDURE — 84484 ASSAY OF TROPONIN QUANT: CPT

## 2017-07-12 PROCEDURE — 93005 ELECTROCARDIOGRAM TRACING: CPT

## 2017-07-12 PROCEDURE — 86901 BLOOD TYPING SEROLOGIC RH(D): CPT

## 2017-07-12 PROCEDURE — 99285 EMERGENCY DEPT VISIT HI MDM: CPT | Mod: ,,, | Performed by: EMERGENCY MEDICINE

## 2017-07-12 PROCEDURE — 85730 THROMBOPLASTIN TIME PARTIAL: CPT

## 2017-07-12 PROCEDURE — 80053 COMPREHEN METABOLIC PANEL: CPT

## 2017-07-12 PROCEDURE — 20600001 HC STEP DOWN PRIVATE ROOM

## 2017-07-12 PROCEDURE — 84443 ASSAY THYROID STIM HORMONE: CPT

## 2017-07-12 PROCEDURE — 85610 PROTHROMBIN TIME: CPT

## 2017-07-12 PROCEDURE — 99220 PR INITIAL OBSERVATION CARE,LEVL III: CPT | Mod: ,,, | Performed by: NURSE PRACTITIONER

## 2017-07-12 PROCEDURE — 93010 ELECTROCARDIOGRAM REPORT: CPT | Mod: ,,, | Performed by: INTERNAL MEDICINE

## 2017-07-12 PROCEDURE — 25000003 PHARM REV CODE 250: Performed by: NURSE PRACTITIONER

## 2017-07-12 PROCEDURE — 93010 ELECTROCARDIOGRAM REPORT: CPT | Mod: 77,,, | Performed by: INTERNAL MEDICINE

## 2017-07-12 PROCEDURE — 83880 ASSAY OF NATRIURETIC PEPTIDE: CPT

## 2017-07-12 PROCEDURE — 94761 N-INVAS EAR/PLS OXIMETRY MLT: CPT

## 2017-07-12 PROCEDURE — 85025 COMPLETE CBC W/AUTO DIFF WBC: CPT

## 2017-07-12 PROCEDURE — 86900 BLOOD TYPING SEROLOGIC ABO: CPT

## 2017-07-12 RX ORDER — SODIUM CHLORIDE 9 MG/ML
INJECTION, SOLUTION INTRAVENOUS CONTINUOUS
Status: ACTIVE | OUTPATIENT
Start: 2017-07-13 | End: 2017-07-13

## 2017-07-12 RX ORDER — ONDANSETRON 2 MG/ML
4 INJECTION INTRAMUSCULAR; INTRAVENOUS EVERY 12 HOURS PRN
Status: DISCONTINUED | OUTPATIENT
Start: 2017-07-12 | End: 2017-07-15 | Stop reason: HOSPADM

## 2017-07-12 RX ORDER — IBUPROFEN 200 MG
24 TABLET ORAL
Status: DISCONTINUED | OUTPATIENT
Start: 2017-07-12 | End: 2017-07-15 | Stop reason: HOSPADM

## 2017-07-12 RX ORDER — PANTOPRAZOLE SODIUM 40 MG/1
40 TABLET, DELAYED RELEASE ORAL DAILY
Status: DISCONTINUED | OUTPATIENT
Start: 2017-07-13 | End: 2017-07-15 | Stop reason: HOSPADM

## 2017-07-12 RX ORDER — ONDANSETRON 8 MG/1
8 TABLET, ORALLY DISINTEGRATING ORAL EVERY 8 HOURS PRN
Status: DISCONTINUED | OUTPATIENT
Start: 2017-07-12 | End: 2017-07-15 | Stop reason: HOSPADM

## 2017-07-12 RX ORDER — AMOXICILLIN 250 MG
1 CAPSULE ORAL 2 TIMES DAILY PRN
Status: DISCONTINUED | OUTPATIENT
Start: 2017-07-12 | End: 2017-07-15 | Stop reason: HOSPADM

## 2017-07-12 RX ORDER — IBUPROFEN 200 MG
16 TABLET ORAL
Status: DISCONTINUED | OUTPATIENT
Start: 2017-07-12 | End: 2017-07-15 | Stop reason: HOSPADM

## 2017-07-12 RX ORDER — NAPROXEN SODIUM 220 MG/1
324 TABLET, FILM COATED ORAL
Status: COMPLETED | OUTPATIENT
Start: 2017-07-12 | End: 2017-07-12

## 2017-07-12 RX ORDER — GLUCAGON 1 MG
1 KIT INJECTION
Status: DISCONTINUED | OUTPATIENT
Start: 2017-07-12 | End: 2017-07-15 | Stop reason: HOSPADM

## 2017-07-12 RX ORDER — IPRATROPIUM BROMIDE AND ALBUTEROL SULFATE 2.5; .5 MG/3ML; MG/3ML
3 SOLUTION RESPIRATORY (INHALATION) EVERY 4 HOURS PRN
Status: DISCONTINUED | OUTPATIENT
Start: 2017-07-12 | End: 2017-07-15 | Stop reason: HOSPADM

## 2017-07-12 RX ORDER — RAMELTEON 8 MG/1
8 TABLET ORAL NIGHTLY PRN
Status: DISCONTINUED | OUTPATIENT
Start: 2017-07-12 | End: 2017-07-15 | Stop reason: HOSPADM

## 2017-07-12 RX ADMIN — SODIUM CHLORIDE: 0.9 INJECTION, SOLUTION INTRAVENOUS at 11:07

## 2017-07-12 RX ADMIN — APIXABAN 5 MG: 5 TABLET, FILM COATED ORAL at 11:07

## 2017-07-12 RX ADMIN — ASPIRIN 81 MG CHEWABLE TABLET 324 MG: 81 TABLET CHEWABLE at 05:07

## 2017-07-12 NOTE — ED PROVIDER NOTES
Encounter Date: 2017    SCRIBE #1 NOTE: I, Melvina Jorge, am scribing for, and in the presence of,  Dr. Miles. I have scribed the following portions of the note - the Resident attestation and the EKG reading.       History     Chief Complaint   Patient presents with    Fatigue     liver issue, feeling weak for 3 weeks     HPI   Fatigue x 1 month.  Since liver biopsy.  Also has DVT, on eliquis.  Also been having coughing spells.  Denies fever, chills, N/V/D, HA, dysuria, flank pain.  Her  states she is more pale than normal.  She denies chest pain, SOB, palpitations.    Review of patient's allergies indicates:  No Known Allergies  Past Medical History:   Diagnosis Date    Blood clot in vein     right lower extremity     Past Surgical History:   Procedure Laterality Date     SECTION      CHOLECYSTECTOMY      TONSILLECTOMY       Family History   Problem Relation Age of Onset    Breast cancer Sister 60    Cancer Father     Vasculitis Mother     Paget's disease of bone Mother     No Known Problems Brother     No Known Problems Daughter     No Known Problems Son     Colon cancer Neg Hx     Ovarian cancer Neg Hx      Social History   Substance Use Topics    Smoking status: Never Smoker    Smokeless tobacco: Never Used    Alcohol use Yes      Comment: [place] periodically     Review of Systems   Constitutional: Positive for fatigue.   All other systems reviewed and are negative.      Physical Exam     Initial Vitals [17 1343]   BP Pulse Resp Temp SpO2   117/66 108 18 98.7 °F (37.1 °C) 97 %      MAP       83         Physical Exam  Gen/Constitutional: Interactive. No acute distress  Head: Normocephalic, Atraumatic  Neck: supple, no masses or LAD  Eyes: PERRLA, conjunctiva clear  Ears, Nose and Throat: No rhinorrhea or stridor.  Cardiac: Reg Rhythm, No murmur  Pulmonary: CTA Bilat, no wheezes, rhonchi, rales.  GI: Abdomen soft, non-tender, non-distended; no rebound or  guarding  : No CVA tenderness.  Musculoskeletal: Extremities warm, well perfused, no erythema, no edema  Skin: No rashes  Neuro: Alert and Oriented x 3; No focal motor or sensory deficits.    Psych: Normal affect    ED Course   Procedures  Labs Reviewed   CBC W/ AUTO DIFFERENTIAL - Abnormal; Notable for the following:        Result Value    Hematocrit 36.3 (*)     Gran # 8.6 (*)     Gran% 80.8 (*)     Lymph% 9.4 (*)     All other components within normal limits   COMPREHENSIVE METABOLIC PANEL - Abnormal; Notable for the following:     CO2 21 (*)     Albumin 3.3 (*)     Alkaline Phosphatase 564 (*)      (*)      (*)     All other components within normal limits   TROPONIN I - Abnormal; Notable for the following:     Troponin I 0.735 (*)     All other components within normal limits   URINALYSIS, REFLEX TO URINE CULTURE - Abnormal; Notable for the following:     Appearance, UA Hazy (*)     Leukocytes, UA 1+ (*)     All other components within normal limits    Narrative:     Preferred Collection Type->Urine, Clean Catch   CULTURE, BLOOD   CULTURE, BLOOD   B-TYPE NATRIURETIC PEPTIDE   APTT   LACTIC ACID, PLASMA   LIPASE   MAGNESIUM   PROTIME-INR   TSH   URINALYSIS MICROSCOPIC    Narrative:     Preferred Collection Type->Urine, Clean Catch   TYPE & SCREEN     EKG Readings: (Independently Interpreted)   EKG: Sinus tachycardia at 101, no BLANE's or STD's, non-specific twave pattern, no STEMI      Imaging Results          X-Ray Chest 1 View (Final result)  Result time 07/12/17 16:10:22    Final result by David Vazquez MD (07/12/17 16:10:22)                 Impression:        No acute cardiopulmonary processes.      Electronically signed by: DAVID VAZQUEZ MD  Date:     07/12/17  Time:    16:10              Narrative:    History: Weakness.    Procedure: Chest one view    Findings    The examination is compared to study of 2/20/2017.    Cardiomediastinal silhouette remains within normal limits.  There is no  tracheal abnormalities the lungs are clear.  Pulmonary vasculature within normal limits.  There are cardiac monitoring leads of the chest.                                Medical Decision Making:   History:   Old Medical Records: I decided to obtain old medical records.  Independently Interpreted Test(s):   I have ordered and independently interpreted EKG Reading(s) - see prior notes  Clinical Tests:   Lab Tests: Ordered and Reviewed  Medical Tests: Ordered and Reviewed  Patient presents with fatigue for the past month, unchanged in the past several days. She is currently being worked up for liver abnormality and has MRI scheduled for tomorrow. Will check basic labs and evaluate for anemia, hypothyroidism, electrolyte abnormality, gross elevation of liver enzymes, and UTI.        APC / Resident Notes:   5:28 PM  Troponin was elevated, no EKG changes. On further questioning, patient does endorse some mild chest pressure. Patient is already on Eliquis, given 324mg ASA. Discussed plan of admission for further evaluation with the patient, she understands and agrees.    9:16 PM  Discussed this patient's case with cardiology fellow, who will come to the ED to evaluate the patient       Scribe Attestation:   Scribe #1: I performed the above scribed service and the documentation accurately describes the services I performed. I attest to the accuracy of the note.    Attending Attestation:   Physician Attestation Statement for Resident:  As the supervising MD   Physician Attestation Statement: I have personally seen and examined this patient.   I agree with the above history. -: Pt  presents with fatigue for the last 3 weeks. Has hx of DVT and possible portal vein thrombosis is being followed by hepatology and vascular surgery for this and is on eliquis. Came to ER because not getting answers fast enough through outpatient workup. Fatigue not worsening. Plan to check sepsis labs, TSH, urine, CXR, LFT's, cardiac labs, EKG and  will reevaluate if labs unremarkable I feel pt will be stable for continued outpatient workup and outpatient follow up with liver, vascular, and PCP. Outpatient MRI schedule for tomorrow.    As the supervising MD I agree with the above PE.    As the supervising MD I agree with the above treatment, course, plan, and disposition.   -: Trop elevated.  No BLANE's or STD's on EKG.  Felt patient having NSTEMI as cause of her fatigue.  Cards consulted and recs pending.  Medicine accepted for admission.  I have reviewed and agree with the residents interpretation of the following: lab data.  I have reviewed the following: x-ray reports and EKG reports.          Physician Attestation for Scribe:  Physician Attestation Statement for Scribe #1: I, Dr. Miles, reviewed documentation, as scribed by Melvina Jorge in my presence, and it is both accurate and complete.                 ED Course     Clinical Impression:   The primary encounter diagnosis was NSTEMI (non-ST elevated myocardial infarction). Diagnoses of Fatigue, Weakness, Elevated transaminase level, and Elevated alkaline phosphatase level were also pertinent to this visit.    Disposition:   Disposition: Admitted                        Bin Miles MD  07/12/17 7520

## 2017-07-12 NOTE — TELEPHONE ENCOUNTER
MA called patient back, patient stated that she is getting weaker and weaker everyday. She wants to do what she supposed to do next. Inform patient that she is scheduled for MRI tomorrow 7/13/17. Inform patient that MD. Would like for her to have that test done and we are going to inform her as soon as MD get the results. Patient understood. Patient stated that she don't want to wait another 2 weeks to get the results. NINO

## 2017-07-12 NOTE — PROVIDER PROGRESS NOTES - EMERGENCY DEPT.
Encounter Date: 7/12/2017    ED Physician Progress Notes         EKG - STEMI Decision  Initial Reading: No STEMI present.  Response: No Action Needed.

## 2017-07-12 NOTE — ED TRIAGE NOTES
Pt reports she was diagnosed with a blood clot in her liver 2 weeks ago and now reports that she has been having fatigue that increases each day. Also c/o cough that increases when she speaks that has been going on for months. She states she was diagnosed with vocal cord irritation but thinks it may be something else. Blood clot in right LE and is currently on therapy.

## 2017-07-12 NOTE — TELEPHONE ENCOUNTER
----- Message from Yung Wolf sent at 7/12/2017 10:19 AM CDT -----  Contact: patient  Calling to speak with Dr Sanches regarding appt for biopsy please call

## 2017-07-12 NOTE — ED NOTES
"Patient identifiers verified and correct for Andra Matamoros.    LOC: The patient is awake, alert and aware of environment with an appropriate affect, the patient is oriented x 3 and speaking appropriately.  APPEARANCE: Patient resting comfortably and in no acute distress, patient is clean and well groomed, patient's clothing is properly fastened.  SKIN: The skin is warm and dry, color consistent with ethnicity, patient has normal skin turgor and moist mucus membranes, skin intact, no breakdown or bruising noted.  MUSCULOSKELETAL: Patient moving all extremities spontaneously, no obvious swelling or deformities noted. Right lower extr normal. Pt states she has had repeat ultrasound that shed clearance of blood clot.   RESPIRATORY: Airway is open and patent, respirations are spontaneous, patient has a normal effort and rate, no accessory muscle use noted, bilateral breath sounds clear.  CARDIAC: Patient has a normal rate and regular rhythm, no periphreal edema noted, capillary refill < 3 seconds.  ABDOMEN: Soft and non tender to palpation, no distention noted, normoactive bowel sounds present in all four quadrants. Pt states "i feel bloated"   NEUROLOGIC: PERRL, 3 mm bilaterally, eyes open spontaneously, behavior appropriate to situation, follows commands, facial expression symmetrical, bilateral hand grasp equal and even, purposeful motor response noted, normal sensation in all extremities when touched with a finger.    "

## 2017-07-13 ENCOUNTER — PATIENT MESSAGE (OUTPATIENT)
Dept: HEPATOLOGY | Facility: CLINIC | Age: 67
End: 2017-07-13

## 2017-07-13 ENCOUNTER — PATIENT MESSAGE (OUTPATIENT)
Dept: INTERNAL MEDICINE | Facility: CLINIC | Age: 67
End: 2017-07-13

## 2017-07-13 PROBLEM — R97.8 ELEVATED TUMOR MARKERS: Status: ACTIVE | Noted: 2017-07-13

## 2017-07-13 PROBLEM — I24.89 DEMAND ISCHEMIA: Status: ACTIVE | Noted: 2017-07-12

## 2017-07-13 PROBLEM — R79.89 ELEVATED TROPONIN: Status: ACTIVE | Noted: 2017-07-13

## 2017-07-13 PROBLEM — I26.99 ACUTE PULMONARY EMBOLISM: Status: ACTIVE | Noted: 2017-07-13

## 2017-07-13 PROBLEM — I82.811 SUPERFICIAL THROMBOSIS OF RIGHT LOWER EXTREMITY: Status: ACTIVE | Noted: 2017-07-13

## 2017-07-13 LAB
AFP SERPL-MCNC: 3.6 NG/ML
ALBUMIN SERPL BCP-MCNC: 3 G/DL
ALP SERPL-CCNC: 548 U/L
ALT SERPL W/O P-5'-P-CCNC: 137 U/L
ANION GAP SERPL CALC-SCNC: 6 MMOL/L
APTT BLDCRRT: 21.6 SEC
AST SERPL-CCNC: 118 U/L
BASOPHILS # BLD AUTO: 0.01 K/UL
BASOPHILS NFR BLD: 0.1 %
BILIRUB SERPL-MCNC: 0.5 MG/DL
BUN SERPL-MCNC: 19 MG/DL
CALCIUM SERPL-MCNC: 9.2 MG/DL
CANCER AG19-9 SERPL-ACNC: ABNORMAL U/ML
CHLORIDE SERPL-SCNC: 105 MMOL/L
CO2 SERPL-SCNC: 27 MMOL/L
CREAT SERPL-MCNC: 0.7 MG/DL
D DIMER PPP IA.FEU-MCNC: 3.19 MG/L FEU
DIASTOLIC DYSFUNCTION: NO
DIFFERENTIAL METHOD: ABNORMAL
EOSINOPHIL # BLD AUTO: 0.2 K/UL
EOSINOPHIL NFR BLD: 2 %
ERYTHROCYTE [DISTWIDTH] IN BLOOD BY AUTOMATED COUNT: 13.9 %
EST. GFR  (AFRICAN AMERICAN): >60 ML/MIN/1.73 M^2
EST. GFR  (NON AFRICAN AMERICAN): >60 ML/MIN/1.73 M^2
ESTIMATED PA SYSTOLIC PRESSURE: 16.81
GLUCOSE SERPL-MCNC: 103 MG/DL
HCT VFR BLD AUTO: 36.2 %
HGB BLD-MCNC: 11.7 G/DL
INR PPP: 1
LYMPHOCYTES # BLD AUTO: 0.8 K/UL
LYMPHOCYTES NFR BLD: 10.9 %
MAGNESIUM SERPL-MCNC: 2 MG/DL
MCH RBC QN AUTO: 27.3 PG
MCHC RBC AUTO-ENTMCNC: 32.3 %
MCV RBC AUTO: 85 FL
MITRAL VALVE REGURGITATION: NORMAL
MONOCYTES # BLD AUTO: 0.8 K/UL
MONOCYTES NFR BLD: 10 %
NEUTROPHILS # BLD AUTO: 5.8 K/UL
NEUTROPHILS NFR BLD: 76.6 %
PHOSPHATE SERPL-MCNC: 3.7 MG/DL
PLATELET # BLD AUTO: 172 K/UL
PMV BLD AUTO: 9.6 FL
POTASSIUM SERPL-SCNC: 4.2 MMOL/L
PROT SERPL-MCNC: 7 G/DL
PROTHROMBIN TIME: 11.1 SEC
RBC # BLD AUTO: 4.28 M/UL
RETIRED EF AND QEF - SEE NOTES: 60 (ref 55–65)
SODIUM SERPL-SCNC: 138 MMOL/L
TRICUSPID VALVE REGURGITATION: NORMAL
TROPONIN I SERPL DL<=0.01 NG/ML-MCNC: 0.32 NG/ML
TROPONIN I SERPL DL<=0.01 NG/ML-MCNC: 0.42 NG/ML
TROPONIN I SERPL DL<=0.01 NG/ML-MCNC: 0.61 NG/ML
WBC # BLD AUTO: 7.61 K/UL

## 2017-07-13 PROCEDURE — 99223 1ST HOSP IP/OBS HIGH 75: CPT | Mod: ,,, | Performed by: INTERNAL MEDICINE

## 2017-07-13 PROCEDURE — 36415 COLL VENOUS BLD VENIPUNCTURE: CPT

## 2017-07-13 PROCEDURE — 93306 TTE W/DOPPLER COMPLETE: CPT | Mod: 26,,, | Performed by: INTERNAL MEDICINE

## 2017-07-13 PROCEDURE — 25500020 PHARM REV CODE 255: Performed by: HOSPITALIST

## 2017-07-13 PROCEDURE — 85303 CLOT INHIBIT PROT C ACTIVITY: CPT

## 2017-07-13 PROCEDURE — 93005 ELECTROCARDIOGRAM TRACING: CPT

## 2017-07-13 PROCEDURE — 85610 PROTHROMBIN TIME: CPT

## 2017-07-13 PROCEDURE — 86147 CARDIOLIPIN ANTIBODY EA IG: CPT

## 2017-07-13 PROCEDURE — 93306 TTE W/DOPPLER COMPLETE: CPT

## 2017-07-13 PROCEDURE — 82105 ALPHA-FETOPROTEIN SERUM: CPT

## 2017-07-13 PROCEDURE — 85300 ANTITHROMBIN III ACTIVITY: CPT

## 2017-07-13 PROCEDURE — 81241 F5 GENE: CPT

## 2017-07-13 PROCEDURE — 99226 PR SUBSEQUENT OBSERVATION CARE,LEVEL III: CPT | Mod: ,,, | Performed by: NURSE PRACTITIONER

## 2017-07-13 PROCEDURE — 86146 BETA-2 GLYCOPROTEIN ANTIBODY: CPT

## 2017-07-13 PROCEDURE — 85613 RUSSELL VIPER VENOM DILUTED: CPT

## 2017-07-13 PROCEDURE — 85730 THROMBOPLASTIN TIME PARTIAL: CPT

## 2017-07-13 PROCEDURE — 85025 COMPLETE CBC W/AUTO DIFF WBC: CPT

## 2017-07-13 PROCEDURE — 85379 FIBRIN DEGRADATION QUANT: CPT

## 2017-07-13 PROCEDURE — 84100 ASSAY OF PHOSPHORUS: CPT

## 2017-07-13 PROCEDURE — 81270 JAK2 GENE: CPT

## 2017-07-13 PROCEDURE — 80053 COMPREHEN METABOLIC PANEL: CPT

## 2017-07-13 PROCEDURE — G0378 HOSPITAL OBSERVATION PER HR: HCPCS

## 2017-07-13 PROCEDURE — 25000003 PHARM REV CODE 250: Performed by: NURSE PRACTITIONER

## 2017-07-13 PROCEDURE — 93010 ELECTROCARDIOGRAM REPORT: CPT | Mod: ,,, | Performed by: INTERNAL MEDICINE

## 2017-07-13 PROCEDURE — 99235 HOSP IP/OBS SAME DATE MOD 70: CPT | Mod: GC,,, | Performed by: INTERNAL MEDICINE

## 2017-07-13 PROCEDURE — 84484 ASSAY OF TROPONIN QUANT: CPT

## 2017-07-13 PROCEDURE — 83735 ASSAY OF MAGNESIUM: CPT

## 2017-07-13 PROCEDURE — 86301 IMMUNOASSAY TUMOR CA 19-9: CPT

## 2017-07-13 PROCEDURE — 88188 FLOWCYTOMETRY/READ 9-15: CPT

## 2017-07-13 PROCEDURE — 85307 ASSAY ACTIVATED PROTEIN C: CPT

## 2017-07-13 PROCEDURE — 88184 FLOWCYTOMETRY/ TC 1 MARKER: CPT

## 2017-07-13 PROCEDURE — 20600001 HC STEP DOWN PRIVATE ROOM

## 2017-07-13 PROCEDURE — 85598 HEXAGNAL PHOSPH PLTLT NEUTRL: CPT

## 2017-07-13 RX ORDER — ENOXAPARIN SODIUM 100 MG/ML
1 INJECTION SUBCUTANEOUS
Status: DISCONTINUED | OUTPATIENT
Start: 2017-07-13 | End: 2017-07-15 | Stop reason: HOSPADM

## 2017-07-13 RX ADMIN — APIXABAN 5 MG: 5 TABLET, FILM COATED ORAL at 08:07

## 2017-07-13 RX ADMIN — IOHEXOL 75 ML: 350 INJECTION, SOLUTION INTRAVENOUS at 12:07

## 2017-07-13 RX ADMIN — PANTOPRAZOLE SODIUM 40 MG: 40 TABLET, DELAYED RELEASE ORAL at 08:07

## 2017-07-13 NOTE — ASSESSMENT & PLAN NOTE
Elevated troponin  - initial troponin 0.735 - EKG with no obvious ST elevation or depression  - cardiology consulted - appreciate rec's  - trend troponin Q6 - serial EKG Q6  - no previous ECHO on record - ECHO pending  - currently on apixaban for RLE superficial thrombus - continue as directed   - received 324 ASA in ED

## 2017-07-13 NOTE — ASSESSMENT & PLAN NOTE
- U/S 6/29/17 subacute superficial thrombus of the GSV from the SFJ to below the knee  - continue apixaban as directed

## 2017-07-13 NOTE — ED NOTES
"Patient c/o left arm "achiness." Patient states it has been off and on for 2 weeks. Patient denies chest discomfort/SOB. Hospital medicine at bedside and notified of complaint.   "

## 2017-07-13 NOTE — ED NOTES
Hourly rounding complete.  Patient updated on plan of care and current status. Patient assessed with pain 0/10. Items within reach. Toileting offered.

## 2017-07-13 NOTE — PLAN OF CARE
Problem: Patient Care Overview  Goal: Plan of Care Review  Outcome: Ongoing (interventions implemented as appropriate)  Pt denies Chest pain, SOB or nausea. No falls, trauma or injury noted. VSS. NS infusing at 75mL/hr. 2D echo in am. PE work up. Trending troponin. Plan of care reviewed with patient. No further questions at this time. No significant events. Will continue to monitor.

## 2017-07-13 NOTE — SUBJECTIVE & OBJECTIVE
Past Medical History:   Diagnosis Date    Blood clot in vein     right lower extremity       Past Surgical History:   Procedure Laterality Date     SECTION      CHOLECYSTECTOMY      TONSILLECTOMY         Review of patient's allergies indicates:  No Known Allergies    No current facility-administered medications on file prior to encounter.      Current Outpatient Prescriptions on File Prior to Encounter   Medication Sig    apixaban 5 mg Tab Take 1 tablet (5 mg total) by mouth 2 (two) times daily.     Family History     Problem Relation (Age of Onset)    Breast cancer Sister (60)    Cancer Father    No Known Problems Brother, Daughter, Son    Paget's disease of bone Mother    Vasculitis Mother        Social History Main Topics    Smoking status: Never Smoker    Smokeless tobacco: Never Used    Alcohol use Yes      Comment: [place] garcia    Drug use: No    Sexual activity: Not Currently     Partners: Male     Birth control/ protection: Post-menopausal     Review of Systems   Constitutional: Positive for activity change, appetite change, fatigue and unexpected weight change. Negative for chills and fever.   HENT: Negative for congestion.    Eyes: Negative for visual disturbance.   Respiratory: Negative for cough, chest tightness and shortness of breath.    Cardiovascular: Negative for chest pain, palpitations and leg swelling.   Gastrointestinal: Negative for abdominal distention and abdominal pain.        Abdominal bloating, decreased appetite     Endocrine: Negative for cold intolerance and heat intolerance.   Genitourinary: Negative for dysuria and frequency.   Musculoskeletal: Positive for arthralgias and back pain. Negative for joint swelling.   Skin: Negative for rash.   Allergic/Immunologic: Negative for food allergies.   Neurological: Negative for seizures and syncope.   Hematological: Does not bruise/bleed easily.     Objective:     Vital Signs (Most Recent):  Temp: 97.6 °F (36.4  °C) (07/12/17 2215)  Pulse: 101 (07/12/17 2215)  Resp: 15 (07/12/17 2215)  BP: 102/69 (07/12/17 2215)  SpO2: 97 % (07/12/17 2215) Vital Signs (24h Range):  Temp:  [97.6 °F (36.4 °C)-98.7 °F (37.1 °C)] 97.6 °F (36.4 °C)  Pulse:  [] 101  Resp:  [14-18] 15  SpO2:  [97 %-100 %] 97 %  BP: (102-143)/(59-79) 102/69     Weight: 58.8 kg (129 lb 10.1 oz)  Body mass index is 22.25 kg/m².    Physical Exam   Constitutional: She is oriented to person, place, and time. She appears well-developed. No distress.   HENT:   Head: Normocephalic and atraumatic.   Eyes: EOM are normal. Pupils are equal, round, and reactive to light.   Neck: Normal range of motion. Neck supple.   Cardiovascular: Normal rate, regular rhythm, normal heart sounds and intact distal pulses.  Exam reveals no gallop and no friction rub.    No murmur heard.  Pulmonary/Chest: Effort normal and breath sounds normal. No respiratory distress. She has no wheezes. She has no rales.   Abdominal: Soft. Bowel sounds are normal. She exhibits no distension. There is no tenderness.   Musculoskeletal: Normal range of motion. She exhibits no edema.   Neurological: She is alert and oriented to person, place, and time.   Skin: Skin is warm and dry. She is not diaphoretic.   Psychiatric: She has a normal mood and affect. Her behavior is normal.   Nursing note and vitals reviewed.       Significant Labs:   CBC:   Recent Labs  Lab 07/12/17  1604   WBC 10.60   HGB 12.0   HCT 36.3*        CMP:   Recent Labs  Lab 07/12/17  1604      K 4.5      CO2 21*      BUN 22   CREATININE 0.8   CALCIUM 9.2   PROT 7.7   ALBUMIN 3.3*   BILITOT 0.5   ALKPHOS 564*   *   *   ANIONGAP 12   EGFRNONAA >60.0     Cardiac Markers:   Recent Labs  Lab 07/12/17  1604   BNP 16     Coagulation:   Recent Labs  Lab 07/12/17  1604   INR 1.1   APTT 21.3     Lactic Acid:   Recent Labs  Lab 07/12/17  1604   LACTATE 0.8     Troponin:   Recent Labs  Lab 07/12/17  1604    TROPONINI 0.735*     TSH:   Recent Labs  Lab 07/12/17  1604   TSH 1.590     All pertinent labs within the past 24 hours have been reviewed.    Significant Imaging: I have reviewed all pertinent imaging results/findings within the past 24 hours.

## 2017-07-13 NOTE — PROGRESS NOTES
Progress Note  Cardiology    Admit Date: 7/12/2017   LOS: 1 day     SUBJECTIVE:     Follow-up For:  Chest pain    Interval hx:  Admitted overnight for troponemia. Reported to her primary team today that she was having a different type of chest pain since admit. She relates:    EKG done this morning at 0400 shows NSR and no ST segments c/w ischemia. Troponin's are downtrending from 0.7 to 0.3 (most recently at 9 this morning). 2D echo done today shows normal EF with no regional wall abnormalities. This was also no right ventricular systolic dysfunction. CXR showed no acute cardiopulmonary disease.      CTA was significant for bilateral intraluminal filling defects including the superior segmental/subsegmental arteries of the right lower lobe.      Scheduled Meds:   apixaban  5 mg Oral BID    pantoprazole  40 mg Oral Daily     Continuous Infusions:   PRN Meds:albuterol-ipratropium 2.5mg-0.5mg/3mL, dextrose 50%, dextrose 50%, glucagon (human recombinant), glucose, glucose, ondansetron, ondansetron, ramelteon, senna-docusate 8.6-50 mg    Review of patient's allergies indicates:  No Known Allergies    Review of Systems  Constitution: Negative for fever, chills. Positive for weight loss and fatigue   HENT: Negative for sore throat, rhinorrhea, or headache.  Eyes: Negative for blurred or double vision.   Cardiovascular: positive for SOB, left arm arm today  Pulmonary: Negative for cough or hemoptysis  Gastrointestinal: Negative for abdominal pain, nausea, vomiting, or diarrhea.   : Negative for dysuria.   Neurological: Negative for focal weakness or sensory changes.    OBJECTIVE:     Vital Signs (Most Recent)  Temp: 98.2 °F (36.8 °C) (07/13/17 1130)  Pulse: 87 (07/13/17 1130)  Resp: 17 (07/13/17 1130)  BP: 99/68 (07/13/17 1130)  SpO2: 97 % (07/13/17 1130)    Vital Signs Range (Last 24H):  Temp:  [97.6 °F (36.4 °C)-98.7 °F (37.1 °C)]   Pulse:  []   Resp:  [14-24]   BP: ()/(59-79)   SpO2:  [96 %-100 %]     I  & O (Last 24H):  Intake/Output Summary (Last 24 hours) at 07/13/17 1300  Last data filed at 07/13/17 0600   Gross per 24 hour   Intake               30 ml   Output              150 ml   Net             -120 ml     Physical Exam:  Constitutional: NAD, conversant  HEENT: Sclera anicteric, PERRLA, EOMI  Neck: No JVD, no carotid bruits  CV: Tachy, no murmur, normal S1/S2  Pulm: clear  GI: Abdomen soft, NTND, +BS  Extremities: No LE edema, warm and well perfused  Skin: No ecchymosis, erythema, or ulcers  Psych: AOx3, appropriate affect    Laboratory:  CBC:   Recent Labs  Lab 07/13/17  0606   WBC 7.61   RBC 4.28   HGB 11.7*   HCT 36.2*      MCV 85   MCH 27.3   MCHC 32.3     CMP:   Recent Labs  Lab 07/13/17  0606      CALCIUM 9.2   ALBUMIN 3.0*   PROT 7.0      K 4.2   CO2 27      BUN 19   CREATININE 0.7   ALKPHOS 548*   *   *   BILITOT 0.5     LFTs:   Recent Labs  Lab 07/13/17  0606   *   *   ALKPHOS 548*   BILITOT 0.5   PROT 7.0   ALBUMIN 3.0*     Coagulation:   Recent Labs  Lab 07/12/17  1604   LABPROT 11.7   INR 1.1   APTT 21.3     Cardiac markers:   Recent Labs  Lab 07/13/17  0943   TROPONINI 0.318*     ABGs: No results for input(s): PH, PCO2, PO2, HCO3, POCSATURATED, BE in the last 168 hours.  Microbiology Results (last 7 days)     Procedure Component Value Units Date/Time    Blood culture #1 **CANNOT BE ORDERED STAT** [[id number]] Collected:  07/12/17 1605    Order Status:  Completed Specimen:  Blood from Peripheral, Antecubital, Left Updated:  07/13/17 0115     Blood Culture, Routine No Growth to date    Blood culture #2 **CANNOT BE ORDERED STAT** [[id number]] Collected:  07/12/17 1605    Order Status:  Completed Specimen:  Blood from Peripheral, Antecubital, Right Updated:  07/13/17 0115     Blood Culture, Routine No Growth to date        Specimen (12h ago through future)    None          Recent Labs  Lab 07/12/17     COLORU Yellow   SPECGRAV 1.025   PHUR 5.0    PROTEINUA Negative   BACTERIA Rare   NITRITE Negative   LEUKOCYTESUR 1+*   UROBILINOGEN Negative       Diagnostic Results:  ECG: Reviewed  X-Ray: Reviewed  Echo: Reviewed    CTA 07/13/17:  1. Pulmonary arteries demonstrate bilateral intraluminal filling defects including the superior segmental/subsegmental arteries of the RIGHT lower lobe and subsegmental lingula, likely representing pulmonary thromboembolism in patient with history of DVT.      2.  Heterogenous primarily hypodense area involving the majority of the LEFT hepatic lobe without visualization of the LEFT portal vein.  Unchanged from prior CT abdomen and pelvis on 6/21/2017.    3.  Additional findings as above.    ASSESSMENT/PLAN:     66 year old female with history of elevated LFTS s/p liver biopsy, superficial thrombus RLE on eliquis who presents with 1 month of progressive fatigue and 3 weeks of intermittent non-exertional upper abdominal pain. Currently her CTA shows that she has PE and possibly LA thrombus (discussed with Susan Rodriguez). This is the cause for her Troponinemia        Recommendations:  1. Would continue Eliquis at 5 mg BID  2. Would initiate hypercoagulable workup for possible underlying malignancy.   3. No need for FEDE now for possible DAYTON thrombus. Could be done 1 month later to see if thrombus present.   4. 30-day holter monitor for Afib due to her DAYTON thrombus.    Discussed case with Dr. Ness and will sign off,    Gina Mauro MD, PGY-4  Cardiology fellow

## 2017-07-13 NOTE — HPI
67 y/o female, who presents to the ED with c/o fatigue, abdominal bloating, poor appetite, persistent cough, and recent 20lbs weight loss.  She has a PMH of elevated liver enzymes and alkaline phosphatase for which she is followed by outpatient hepatology Dr. Sanches.  She recently underwent CT of the abdomen which was suggestive of a large hypodense area involving the majority of the left hepatic lobe.  She is currently scheduled for outpatient MRI of abdomen with contrast 7/13/17 for further evaluation.  She recently underwent a liver biopsy roughly one month ago and reportedly was unremarkable.  This is her second liver biopsy.  Additionally she was recently evaluated for RLE swelling and pain and was found to have a large superficial thrombus and was started on apixaban. During her initial work up in the ED reveals alkaline phosphatase 564, , , and a troponin of 0.735.  She denies chest pain or SOB.  She states that she has had LUE pain primarily at the elbow and occasional chest tightness.  She states that her fatigue has worsened to the point that she cannot perform her normal activities and often requires rest breaks and naps throughout the day.    Family at bedside state that these breaks and naps are very unusual for her and that she is normally hyper.  She attributes her weight loss to abdominal bloating and feeling of early satiety.  She states that feeling of bloating worsen as the day goes on and by dinner she has little to no appetite. She recently underwent EGD which was reportedly negative for acute findings. She denies recent illness, fever, chills, N/V/D, or  symptoms.

## 2017-07-13 NOTE — H&P
[facility] Hospital Medicine  History & Physical    Patient Name: Andra Matamoros  MRN: [id number]  Admission Date: 7/12/2017  Attending Physician: Floresita Baird MD   Primary Care Provider: Wesly Parr MD    [facility] Medicine Team: [facility] Saulo Wilson NP     Patient information was obtained from patient, relative(s) and ER records.     Subjective:     Principal Problem:NSTEMI (non-ST elevated myocardial infarction)    Chief Complaint:   Chief Complaint   Patient presents with    Fatigue     liver issue, feeling weak for 3 weeks        HPI: 67 y/o female, who presents to the ED with c/o fatigue, abdominal bloating, poor appetite, persistent cough, and recent 20lbs weight loss.  She has a PMH of elevated liver enzymes and alkaline phosphatase for which she is followed by outpatient hepatology Dr. Sanches.  She recently underwent CT of the abdomen which was suggestive of a large hypodense area involving the majority of the left hepatic lobe.  She is currently scheduled for outpatient MRI of abdomen with contrast 7/13/17 for further evaluation.  She recently underwent a liver biopsy roughly one month ago and reportedly was unremarkable.  This is her second liver biopsy.  Additionally she was recently evaluated for RLE swelling and pain and was found to have a large superficial thrombus and was started on apixaban. During her initial work up in the ED reveals alkaline phosphatase 564, , , and a troponin of 0.735.  She denies chest pain or SOB.  She states that she has had LUE pain primarily at the elbow and occasional chest tightness.  She states that her fatigue has worsened to the point that she cannot perform her normal activities and often requires rest breaks and naps throughout the day.    Family at bedside state that these breaks and naps are very unusual for her and that she is normally hyper.  She attributes her weight loss to abdominal bloating and feeling of  early satiety.  She states that feeling of bloating worsen as the day goes on and by dinner she has little to no appetite. She recently underwent EGD which was reportedly negative for acute findings. She denies recent illness, fever, chills, N/V/D, or  symptoms.     Past Medical History:   Diagnosis Date    Blood clot in vein     right lower extremity       Past Surgical History:   Procedure Laterality Date     SECTION      CHOLECYSTECTOMY      TONSILLECTOMY         Review of patient's allergies indicates:  No Known Allergies    No current facility-administered medications on file prior to encounter.      Current Outpatient Prescriptions on File Prior to Encounter   Medication Sig    apixaban 5 mg Tab Take 1 tablet (5 mg total) by mouth 2 (two) times daily.     Family History     Problem Relation (Age of Onset)    Breast cancer Sister (60)    Cancer Father    No Known Problems Brother, Daughter, Son    Paget's disease of bone Mother    Vasculitis Mother        Social History Main Topics    Smoking status: Never Smoker    Smokeless tobacco: Never Used    Alcohol use Yes      Comment: [place] periodically    Drug use: No    Sexual activity: Not Currently     Partners: Male     Birth control/ protection: Post-menopausal     Review of Systems   Constitutional: Positive for activity change, appetite change, fatigue and unexpected weight change. Negative for chills and fever.   HENT: Negative for congestion.    Eyes: Negative for visual disturbance.   Respiratory: Negative for cough, chest tightness and shortness of breath.    Cardiovascular: Negative for chest pain, palpitations and leg swelling.   Gastrointestinal: Negative for abdominal distention and abdominal pain.        Abdominal bloating, decreased appetite     Endocrine: Negative for cold intolerance and heat intolerance.   Genitourinary: Negative for dysuria and frequency.   Musculoskeletal: Positive for arthralgias and back pain.  Negative for joint swelling.   Skin: Negative for rash.   Allergic/Immunologic: Negative for food allergies.   Neurological: Negative for seizures and syncope.   Hematological: Does not bruise/bleed easily.     Objective:     Vital Signs (Most Recent):  Temp: 97.6 °F (36.4 °C) (07/12/17 2215)  Pulse: 101 (07/12/17 2215)  Resp: 15 (07/12/17 2215)  BP: 102/69 (07/12/17 2215)  SpO2: 97 % (07/12/17 2215) Vital Signs (24h Range):  Temp:  [97.6 °F (36.4 °C)-98.7 °F (37.1 °C)] 97.6 °F (36.4 °C)  Pulse:  [] 101  Resp:  [14-18] 15  SpO2:  [97 %-100 %] 97 %  BP: (102-143)/(59-79) 102/69     Weight: 58.8 kg (129 lb 10.1 oz)  Body mass index is 22.25 kg/m².    Physical Exam   Constitutional: She is oriented to person, place, and time. She appears well-developed. No distress.   HENT:   Head: Normocephalic and atraumatic.   Eyes: EOM are normal. Pupils are equal, round, and reactive to light.   Neck: Normal range of motion. Neck supple.   Cardiovascular: Normal rate, regular rhythm, normal heart sounds and intact distal pulses.  Exam reveals no gallop and no friction rub.    No murmur heard.  Pulmonary/Chest: Effort normal and breath sounds normal. No respiratory distress. She has no wheezes. She has no rales.   Abdominal: Soft. Bowel sounds are normal. She exhibits no distension. There is no tenderness.   Musculoskeletal: Normal range of motion. She exhibits no edema.   Neurological: She is alert and oriented to person, place, and time.   Skin: Skin is warm and dry. She is not diaphoretic.   Psychiatric: She has a normal mood and affect. Her behavior is normal.   Nursing note and vitals reviewed.       Significant Labs:   CBC:   Recent Labs  Lab 07/12/17  1604   WBC 10.60   HGB 12.0   HCT 36.3*        CMP:   Recent Labs  Lab 07/12/17  1604      K 4.5      CO2 21*      BUN 22   CREATININE 0.8   CALCIUM 9.2   PROT 7.7   ALBUMIN 3.3*   BILITOT 0.5   ALKPHOS 564*   *   *   ANIONGAP 12    EGFRNONAA >60.0     Cardiac Markers:   Recent Labs  Lab 07/12/17  1604   BNP 16     Coagulation:   Recent Labs  Lab 07/12/17  1604   INR 1.1   APTT 21.3     Lactic Acid:   Recent Labs  Lab 07/12/17  1604   LACTATE 0.8     Troponin:   Recent Labs  Lab 07/12/17  1604   TROPONINI 0.735*     TSH:   Recent Labs  Lab 07/12/17  1604   TSH 1.590     All pertinent labs within the past 24 hours have been reviewed.    Significant Imaging: I have reviewed all pertinent imaging results/findings within the past 24 hours.    Assessment/Plan:     * NSTEMI (non-ST elevated myocardial infarction)    Elevated troponin  - initial troponin 0.735 - EKG with no obvious ST elevation or depression  - cardiology consulted - appreciate rec's  - trend troponin Q6 - serial EKG Q6  - no previous ECHO on record - ECHO pending  - currently on apixaban for RLE superficial thrombus - continue as directed   - received 324 ASA in ED        Elevated liver enzymes    Elevated alkaline phosphatase level  -  /  (baseline AST 28-36 / ALT 22-56)  - Alk Phos 564 (baseline 124-345)  - Hepatology consulted   - CT abdomen performed 6/21/17 with interval development of heterogeneous primarily hypodense area involving the majority of the left hepatic lobe, hepatomegaly, and mild splenomegaly.  - MRI of abdomen with contrast scheduled for 7/13/17 - may need to notify radiology of inpatient status   - f/u on repeat labs  - followed by Dr. Sanches outpatient hepatology         Superficial thrombosis of right lower extremity    - U/S 6/29/17 subacute superficial thrombus of the GSV from the SFJ to below the knee  - continue apixaban as directed          Dyspepsia    - continue pantoprazole as directed            VTE Risk Mitigation         Ordered     apixaban tablet 5 mg  2 times daily     Route:  Oral        07/12/17 2136     Place BA hose  Until discontinued      07/12/17 2136     Place sequential compression device  Until discontinued       07/12/17 2136     Reason for No Pharmacological VTE Prophylaxis  Once      07/12/17 2136     Medium Risk of VTE  Once      07/12/17 2136        Saulo Wilson NP  Department of Hospital Medicine   [facility]

## 2017-07-13 NOTE — NURSING TRANSFER
Nursing Transfer Note      7/12/2017     Transfer From: ED to room 377    Transfer via stretcher    Transfer with cardiac monitoring    Transported by Transporter    Medicines sent:No    Chart send with patient: Yes    Notified: MD aware    Patient reassessed at: 07/12 2210    Upon arrival to floor: cardiac monitor applied, patient oriented to room, call bell in reach and bed in lowest position    Pt is AAOX4. She denies any chest/SOB. VSS. Visi and tele monitoring on. No issues or complaints at this time. Will follow the MD orders and monitor the pt.

## 2017-07-13 NOTE — CONSULTS
Patient seen with Dr. Iglesias    65 yo woman with chronic elevation of alkaline phosphatase. Had liver biopsy on 3/31/17 that showed mild steatosis.  BOBY weakly + at 1:160    Began to have abdominal bloating, early satiety and weight loss, then pain and redness of right upper leg.  Ultrasound on 6/7/17 in greater saphenous vein and started Eliquis.  CT on 6/21/17: hepatosplenomegaly and hypodensity of most of left lobe of liver. Left portal vein not visible and thrombosis of this vessel with some hepatic infarction suspected.  Chest pain in last several days and CT shows multiple pulmonary emboli.    No lymphadenopathy or hepatosplenomegaly on exam. Tender in RUQ.    Impression:  1. Multiple sites of venous thrombosis.  2. Elevated alk phos.      Rec.:  1. Blood studies for JAK2 mutation, PNH, alpha fetoprotein and  ordered.  2. Many other studies for thrombophilic disorders already ordered. I do not think [id number] is indicated.  3. Change to Lovenox therapy.    Nabeel Hobbs MD

## 2017-07-13 NOTE — ED NOTES
Patient with concerns about scheduled MRI and labwork for tomorrow ordered by liver doctor. Admitting team notified and will address with patient when they evaluate her.

## 2017-07-13 NOTE — CONSULTS
[facility]  Hematology/Oncology  Consult Note    Patient Name: Andra Matamoros  MRN: [id number]  Admission Date: 7/12/2017  Hospital Length of Stay: 1 days  Code Status: Full Code   Attending Provider: Floresita Baird MD  Consulting Provider: Leslie Iglesias MD  Primary Care Physician: Wesly Parr MD  Principal Problem:NSTEMI (non-ST elevated myocardial infarction)    Consults  Subjective:     HPI: Mrs. Borja is a 66 year old female with a longstanding history of elevated liver enzymes of undetermined significance who presents with left upper extremity pain, fatigue, abdominal bloating, early satiety, poor appetite and persistent cough. She had a a mild troponin elevation that has been downtrending (as high as 0.735). Patient was diagnosed with a superficial right lower extremity thrombus and due to its proximity to deep vein, she was placed on anticoagulation with abixiban 5 mg BID. Patient travels to and from [place] and noticed right calf pain and swelling while there, shortly after a recent upper endoscopy performed to work up abdominal discomfort. She reports her abdominal symptoms and 20 lb weight loss began after her liver biopsy in March 2017. Liver biopsy was performed to work up elevated liver enzymes. No abnormalities were found on biopsy or US of liver at that time. She had a CT 6/21/17 which showed heterogeneous primarily hypodense area involving the majority of the left hepatic lobe.  This scan was done after she had initial US showing superficial thrombus and started on ac.   During her current hospital work up, CTA was performed 7/13 and showed bilateral pulm emboli. Patient admits to missing a couple of doses of eliquis during her course. She has never had a prior DVT and no family hx of DVT. MRI has been ordered to further work up liver findings.     Oncology Treatment Plan:   [No treatment plan]    Medications:  Continuous Infusions:   Scheduled Meds:   apixaban   5 mg Oral BID    pantoprazole  40 mg Oral Daily     PRN Meds:albuterol-ipratropium 2.5mg-0.5mg/3mL, dextrose 50%, dextrose 50%, glucagon (human recombinant), glucose, glucose, ondansetron, ondansetron, ramelteon, senna-docusate 8.6-50 mg     Review of patient's allergies indicates:  No Known Allergies     Past Medical History:   Diagnosis Date    Blood clot in vein     right lower extremity     Past Surgical History:   Procedure Laterality Date     SECTION      CHOLECYSTECTOMY      TONSILLECTOMY       Family History     Problem Relation (Age of Onset)    Breast cancer Sister (60)    Cancer Father    No Known Problems Brother, Daughter, Son    Paget's disease of bone Mother    Vasculitis Mother        Social History Main Topics    Smoking status: Never Smoker    Smokeless tobacco: Never Used    Alcohol use Yes      Comment: Grand [place] garcia    Drug use: No    Sexual activity: Not Currently     Partners: Male     Birth control/ protection: Post-menopausal       Review of Systems   Constitutional: Positive for fatigue and unexpected weight change.   HENT: Negative for congestion and sore throat.    Eyes: Negative for photophobia and visual disturbance.   Respiratory: Positive for cough. Negative for shortness of breath.    Cardiovascular: Negative for chest pain and leg swelling.   Gastrointestinal: Positive for abdominal distention and abdominal pain.   Endocrine: Negative for polyphagia and polyuria.   Genitourinary: Negative for dysuria.   Musculoskeletal: Negative for arthralgias and back pain.   Skin: Negative for color change and pallor.   Neurological: Negative for light-headedness and headaches.   Hematological: Negative for adenopathy. Does not bruise/bleed easily.   Psychiatric/Behavioral: Negative for agitation and behavioral problems.     Objective:     Vital Signs (Most Recent):  Temp: 98.1 °F (36.7 °C) (17)  Pulse: 102 (17)  Resp: 17 (17)  BP:  93/64 (07/13/17 1600)  SpO2: 96 % (07/13/17 1600) Vital Signs (24h Range):  Temp:  [97.6 °F (36.4 °C)-98.2 °F (36.8 °C)] 98.1 °F (36.7 °C)  Pulse:  [] 102  Resp:  [14-24] 17  SpO2:  [96 %-99 %] 96 %  BP: ()/(59-79) 93/64     Weight: 58.8 kg (129 lb 10.1 oz)  Body mass index is 22.25 kg/m².  Body surface area is 1.63 meters squared.      Intake/Output Summary (Last 24 hours) at 07/13/17 1632  Last data filed at 07/13/17 1618   Gross per 24 hour   Intake              510 ml   Output              550 ml   Net              -40 ml       Physical Exam   Constitutional: She is oriented to person, place, and time. She appears well-developed and well-nourished.   HENT:   Mouth/Throat: Oropharynx is clear and moist.   Eyes: Conjunctivae are normal. Pupils are equal, round, and reactive to light.   Cardiovascular: Normal rate and regular rhythm.    Pulmonary/Chest: Effort normal and breath sounds normal.   Abdominal: Soft. Bowel sounds are normal.   Slightly enlarged spleen    Musculoskeletal: Normal range of motion.   Lymphadenopathy:     She has no cervical adenopathy.   Neurological: She is alert and oriented to person, place, and time.   Skin: Skin is warm and dry.   Psychiatric: She has a normal mood and affect. Her behavior is normal.       Significant Labs:   CBC:   Recent Labs  Lab 07/12/17  1604 07/13/17  0606   WBC 10.60 7.61   HGB 12.0 11.7*   HCT 36.3* 36.2*    172   , CMP:   Recent Labs  Lab 07/12/17  1604 07/13/17  0606 07/13/17  1305    138  --    K 4.5 4.2  --     105  --    CO2 21* 27  --     103  --    BUN 22 19  --    CREATININE 0.8 0.7 0.7   CALCIUM 9.2 9.2  --    PROT 7.7 7.0  --    ALBUMIN 3.3* 3.0*  --    BILITOT 0.5 0.5  --    ALKPHOS 564* 548*  --    * 118*  --    * 137*  --    ANIONGAP 12 6*  --    EGFRNONAA >60.0 >60.0 >60.0   , Coagulation:   Recent Labs  Lab 07/12/17  1604   INR 1.1   APTT 21.3    and LDH: No results for input(s): LDHCSF, BFSOURCE  in the last 48 hours.    Diagnostic Results:  CT: reviewed     Assessment/Plan:     Active Diagnoses:    Diagnosis Date Noted POA    PRINCIPAL PROBLEM:  NSTEMI (non-ST elevated myocardial infarction) [I21.4] 07/12/2017 Yes    Elevated troponin [R74.8] 07/13/2017 Unknown    Superficial thrombosis of right lower extremity [I82.811] 07/13/2017 Unknown    Dyspepsia [R10.13] 05/11/2017 Yes    Elevated liver enzymes [R74.8] 03/31/2017 Yes    Elevated alkaline phosphatase level [R74.8] 01/18/2017 Yes      Problems Resolved During this Admission:    Diagnosis Date Noted Date Resolved POA       A/P  PE with recent hx of SVT of right lower ext on eliquis. Patient may have failed anticoagulation (or possibly was not very compliant with dosing). WIll advise changing to therapeutic lovenox for now. Given her weight loss and abdominal bloating patient may have a portal vein thrombus along with an underlying malignancy or blood disorder with associated thrombophilia. Will work up for these etiologies. A partial thrombophilia work up has already been ordered (beta 2 glycoprotein, prothrombin gene mutation, cardiolipin abs) will add PNH profile, JAk2 mutation and check for tumor markers ca 19-9 and AFP. Will follow up MRI results as well as blood work results. She may follow up in hematology clinic.     Leslie Jaime MD   Pager [phone]      Thank you for your consult.     Leslie Jaime MD  Hematology/Oncology  [facility]      STAFF: Above confirmed. See my earlier note.    Nabeel Hobbs MD

## 2017-07-13 NOTE — CONSULTS
Cardiology ER Consult Note  Attending Physician: Floresita Baird MD  Chief Complaint: Elevated Troponin     HPI:   66 y.o. woman with no significant cardiac history who presents for fatigue since 3/2017 after a liver biopsy. Since then she has had chronic fatigue. For the past 3 weeks she also has had chest tightness and left elbow crease pain that occur at random, are not associated exertion, relieved with time. Chest tightness localizes to top of abdomen. Currently she is able to complete house work but is limited by fatigue.     She was diagnosed with a RLE superficial thrombus of the GSV from SFJ to below the knee (17) and was started on eliquis.     She came to the ED today for the fatigue, noting she wanted to expedite workup as her fatigue was progressive over the last month.    Denies shortness of breath, PND, orthopnea, weight gain.     Non-smoker/no early CAD in family    ROS:    Constitution: Negative for fever, chills, weight loss or gain.   HENT: Negative for sore throat, rhinorrhea, or headache.  Eyes: Negative for blurred or double vision.   Cardiovascular: See above  Pulmonary: Negative for SOB   Gastrointestinal: Negative for abdominal pain, nausea, vomiting, or diarrhea.   : Negative for dysuria.   Neurological: Negative for focal weakness or sensory changes.  PMH:     Past Medical History:   Diagnosis Date    Blood clot in vein     right lower extremity     Past Surgical History:   Procedure Laterality Date     SECTION      CHOLECYSTECTOMY      TONSILLECTOMY       Allergies:   Review of patient's allergies indicates:  No Known Allergies  Medications:     No current facility-administered medications on file prior to encounter.      Current Outpatient Prescriptions on File Prior to Encounter   Medication Sig Dispense Refill    apixaban 5 mg Tab Take 1 tablet (5 mg total) by mouth 2 (two) times daily. 60 tablet 2       Inpatient Medications   Continuous Infusions:    [START ON 7/13/2017] sodium chloride 0.9%       Scheduled Meds:   apixaban  5 mg Oral BID    [START ON 7/13/2017] pantoprazole  40 mg Oral Daily     PRN Meds:albuterol-ipratropium 2.5mg-0.5mg/3mL, dextrose 50%, dextrose 50%, glucagon (human recombinant), glucose, glucose, ondansetron, ondansetron, ramelteon, senna-docusate 8.6-50 mg     Social History:     Social History   Substance Use Topics    Smoking status: Never Smoker    Smokeless tobacco: Never Used    Alcohol use Yes      Comment: [place] periodically     Family History:     Family History   Problem Relation Age of Onset    Breast cancer Sister 60    Cancer Father     Vasculitis Mother     Paget's disease of bone Mother     No Known Problems Brother     No Known Problems Daughter     No Known Problems Son     Colon cancer Neg Hx     Ovarian cancer Neg Hx      Physical Exam:     Vitals:  Temp:  [98.7 °F (37.1 °C)]   Pulse:  []   Resp:  [14-18]   BP: (111-143)/(59-79)   SpO2:  [97 %-100 %]   I/O's:  No intake or output data in the 24 hours ending 07/12/17 2136     Constitutional: NAD, conversant  HEENT: Sclera anicteric, PERRLA, EOMI  Neck: No JVD, no carotid bruits  CV: Tachy, no murmur, normal S1/S2  Pulm: clear  GI: Abdomen soft, NTND, +BS  Extremities: No LE edema, warm and well perfused  Skin: No ecchymosis, erythema, or ulcers  Psych: AOx3, appropriate affect    Labs:       Recent Labs  Lab 07/12/17  1604      K 4.5      CO2 21*   BUN 22   CREATININE 0.8   ANIONGAP 12       Recent Labs  Lab 07/12/17  1604   *   *   ALKPHOS 564*   BILITOT 0.5   ALBUMIN 3.3*       Recent Labs  Lab 07/12/17  1604   TROPONINI 0.735*   BNP 16      Recent Labs  Lab 07/12/17  1604   WBC 10.60   HGB 12.0   HCT 36.3*      GRAN 80.8*  8.6*       Recent Labs  Lab 07/12/17  1604   INR 1.1     Lab Results   Component Value Date    CHOL 180 01/17/2017    HDL 69 01/17/2017    LDLCALC 95.4 01/17/2017    TRIG 78 01/17/2017      Lab Results   Component Value Date    HGBA1C 5.5 01/17/2017        Micro:  Blood Cultures  No results found for: LABBLOO  Urine Cultures  Lab Results   Component Value Date    LABURIN NO SIGNIFICANT GROWTH 07/14/2010       Imaging:       No results found for: EF    EKG: Sinus Tach      Assessment:   66 year old female with history of elevated LFTS s/p liver biopsy, superficial thrombus RLE on eliquis who presents with 1 month of progressive fatigue and 3 weeks of intermittent non-exertional upper abdominal pain. Initial labs significant for troponin elevation which cardiology was consulted for.    Plan:   Elevated Troponin  - Presentation, symptoms and objective information not consistent with a plaque rupture event, at this time do not believe this to be ACS and more likely type II injury  - Would continue to trend troponins x 2 and call cardiology if significant change  - Would consider PE workup as patient's fatigue, sinus tachycardia and troponin could be due to PE. However she is already on anticoagulation so diagnosis may not change therapeutic management.   - Recommend 2d Echo with color flow doppler    Discussed with Cardiology Staff Dr. Connor, staff addendum to follow    Signed:  Srinivas Garrett DO  Cardiology Fellow  Pager - [phone]  7/12/2017 9:36 PM

## 2017-07-13 NOTE — TREATMENT PLAN
Hepatology Progress note    Andra Matamoros is a 66 y.o. female with mild transaminitis and elevated ALP. CT with large hypodense area. No changes since from last clinic visit.    Currently being treated for NSTEMI.    Outpatient plan with MRI - initially scheduled for today    Recommend outpatient follow up with Dr. Sanches after MRI    Please call hepatology with questions/updates.    Hayden Melendez MD  Gastroenterology & Hepatology Fellow ([id number])  Pager: [phone]

## 2017-07-13 NOTE — PLAN OF CARE
Problem: Patient Care Overview  Goal: Plan of Care Review  Outcome: Ongoing (interventions implemented as appropriate)  Plan of care discussed with patient.  Patient ambulating independently in room. VSS. Multiple consults and lab work ordered today.CTA done today.  Will continue coag workup.

## 2017-07-14 LAB
ALBUMIN SERPL BCP-MCNC: 3.1 G/DL
ALP SERPL-CCNC: 670 U/L
ALT SERPL W/O P-5'-P-CCNC: 146 U/L
ANION GAP SERPL CALC-SCNC: 8 MMOL/L
AST SERPL-CCNC: 128 U/L
BASOPHILS # BLD AUTO: 0.01 K/UL
BASOPHILS NFR BLD: 0.1 %
BILIRUB SERPL-MCNC: 0.6 MG/DL
BUN SERPL-MCNC: 15 MG/DL
CALCIUM SERPL-MCNC: 9.4 MG/DL
CARDIOLIPIN IGG SER IA-ACNC: <9.4 GPL
CARDIOLIPIN IGM SER IA-ACNC: <9.4 MPL
CHLORIDE SERPL-SCNC: 101 MMOL/L
CO2 SERPL-SCNC: 27 MMOL/L
CREAT SERPL-MCNC: 0.8 MG/DL
DIFFERENTIAL METHOD: ABNORMAL
EOSINOPHIL # BLD AUTO: 0.1 K/UL
EOSINOPHIL NFR BLD: 1.4 %
ERYTHROCYTE [DISTWIDTH] IN BLOOD BY AUTOMATED COUNT: 13.8 %
EST. GFR  (AFRICAN AMERICAN): >60 ML/MIN/1.73 M^2
EST. GFR  (NON AFRICAN AMERICAN): >60 ML/MIN/1.73 M^2
GLUCOSE SERPL-MCNC: 101 MG/DL
HCT VFR BLD AUTO: 36.1 %
HGB BLD-MCNC: 11.7 G/DL
LYMPHOCYTES # BLD AUTO: 0.9 K/UL
LYMPHOCYTES NFR BLD: 10.8 %
MCH RBC QN AUTO: 27.6 PG
MCHC RBC AUTO-ENTMCNC: 32.4 %
MCV RBC AUTO: 85 FL
MONOCYTES # BLD AUTO: 0.7 K/UL
MONOCYTES NFR BLD: 8 %
NEUTROPHILS # BLD AUTO: 6.7 K/UL
NEUTROPHILS NFR BLD: 79.2 %
PLATELET # BLD AUTO: 222 K/UL
PMV BLD AUTO: 10.2 FL
POTASSIUM SERPL-SCNC: 4.7 MMOL/L
PROT SERPL-MCNC: 7.3 G/DL
RBC # BLD AUTO: 4.24 M/UL
SODIUM SERPL-SCNC: 136 MMOL/L
WBC # BLD AUTO: 8.51 K/UL

## 2017-07-14 PROCEDURE — 80053 COMPREHEN METABOLIC PANEL: CPT

## 2017-07-14 PROCEDURE — 63600175 PHARM REV CODE 636 W HCPCS: Performed by: NURSE PRACTITIONER

## 2017-07-14 PROCEDURE — 25500020 PHARM REV CODE 255: Performed by: HOSPITALIST

## 2017-07-14 PROCEDURE — 82787 IGG 1 2 3 OR 4 EACH: CPT

## 2017-07-14 PROCEDURE — 99233 SBSQ HOSP IP/OBS HIGH 50: CPT | Mod: GC,,, | Performed by: INTERNAL MEDICINE

## 2017-07-14 PROCEDURE — A9585 GADOBUTROL INJECTION: HCPCS | Performed by: HOSPITALIST

## 2017-07-14 PROCEDURE — 99223 1ST HOSP IP/OBS HIGH 75: CPT | Mod: ,,, | Performed by: INTERNAL MEDICINE

## 2017-07-14 PROCEDURE — 36415 COLL VENOUS BLD VENIPUNCTURE: CPT

## 2017-07-14 PROCEDURE — 20600001 HC STEP DOWN PRIVATE ROOM

## 2017-07-14 PROCEDURE — 85025 COMPLETE CBC W/AUTO DIFF WBC: CPT

## 2017-07-14 PROCEDURE — 99233 SBSQ HOSP IP/OBS HIGH 50: CPT | Mod: ,,, | Performed by: NURSE PRACTITIONER

## 2017-07-14 PROCEDURE — 25000003 PHARM REV CODE 250: Performed by: NURSE PRACTITIONER

## 2017-07-14 RX ORDER — ACETAMINOPHEN 325 MG/1
650 TABLET ORAL EVERY 8 HOURS PRN
Status: DISCONTINUED | OUTPATIENT
Start: 2017-07-14 | End: 2017-07-15 | Stop reason: HOSPADM

## 2017-07-14 RX ORDER — ENOXAPARIN SODIUM 100 MG/ML
90 INJECTION SUBCUTANEOUS DAILY
Qty: 30 SYRINGE | Refills: 2 | Status: SHIPPED | OUTPATIENT
Start: 2017-07-14

## 2017-07-14 RX ORDER — ALPRAZOLAM 0.25 MG/1
0.25 TABLET ORAL
Status: DISCONTINUED | OUTPATIENT
Start: 2017-07-14 | End: 2017-07-15 | Stop reason: HOSPADM

## 2017-07-14 RX ORDER — GADOBUTROL 604.72 MG/ML
10 INJECTION INTRAVENOUS
Status: COMPLETED | OUTPATIENT
Start: 2017-07-14 | End: 2017-07-14

## 2017-07-14 RX ORDER — SIMETHICONE 80 MG
1 TABLET,CHEWABLE ORAL 3 TIMES DAILY PRN
Status: DISCONTINUED | OUTPATIENT
Start: 2017-07-14 | End: 2017-07-15 | Stop reason: HOSPADM

## 2017-07-14 RX ADMIN — Medication 1 CAPSULE: at 02:07

## 2017-07-14 RX ADMIN — ACETAMINOPHEN 650 MG: 325 TABLET ORAL at 06:07

## 2017-07-14 RX ADMIN — ENOXAPARIN SODIUM 60 MG: 100 INJECTION SUBCUTANEOUS at 12:07

## 2017-07-14 RX ADMIN — GADOBUTROL 10 ML: 604.72 INJECTION INTRAVENOUS at 11:07

## 2017-07-14 RX ADMIN — PANTOPRAZOLE SODIUM 40 MG: 40 TABLET, DELAYED RELEASE ORAL at 08:07

## 2017-07-14 RX ADMIN — SIMETHICONE CHEW TAB 80 MG 80 MG: 80 TABLET ORAL at 02:07

## 2017-07-14 RX ADMIN — ALPRAZOLAM 0.25 MG: 0.25 TABLET ORAL at 10:07

## 2017-07-14 RX ADMIN — ENOXAPARIN SODIUM 60 MG: 100 INJECTION SUBCUTANEOUS at 10:07

## 2017-07-14 RX ADMIN — ENOXAPARIN SODIUM 60 MG: 100 INJECTION SUBCUTANEOUS at 09:07

## 2017-07-14 RX ADMIN — PANCRELIPASE 2 CAPSULE: 24000; 76000; 120000 CAPSULE, DELAYED RELEASE PELLETS ORAL at 04:07

## 2017-07-14 NOTE — ASSESSMENT & PLAN NOTE
Elevated troponin  - initial troponin 0.735 - EKG with no obvious ST elevation or depression  - cardiology consulted - appreciate rec's  - trop flat, demand ischemia from PE's  - no previous ECHO on record - ECHO see below  - currently on apixaban for RLE superficial thrombus - now with PE's ? DAYTON clot and ? hepatic thrombosis, will switch to lovenox for treatment dosing  Echo:  1 - Normal left ventricular systolic function (EF 60-65%).     2 - Normal left ventricular diastolic function.     3 - Normal right ventricular systolic function .     4 - The estimated PA systolic pressure is greater than 17 mmHg.

## 2017-07-14 NOTE — ASSESSMENT & PLAN NOTE
Elevated alkaline phosphatase level - followed by Dr. Sanches outpatient hepatology   -  /  (baseline AST 28-36 / ALT 22-56)  - Alk Phos 564 (baseline 124-345)  - Hepatology consulted - see above  - CT abdomen performed 6/21/17 with interval development of heterogeneous primarily hypodense area involving the majority of the left hepatic lobe, hepatomegaly, and mild splenomegaly.

## 2017-07-14 NOTE — HOSPITAL COURSE
7/12 admitted to hospital medicine for progressive and excessive fatigue, intermittent L arm pain and back pain.  She noted her symptoms have progressed rapidly since her liver biopsy in June 2017, which was performed due to elevated LFT's, recent 20 lb weight loss and it was inconclusive.  She was due for an MRI today of her liver, however since she's inpatient, this was discussed with hepatology and this can be rescheduled in a week or two, while she's being worked up for her troponinemia, L arm/ back pain.  Cards consult saw in ED, felt this was demand ischemia, and that PE could be considered on the differential.  D dimer was ordered and it was 3.19, so CTA PE protocol revealed bilateral intraluminal filling defects including the superior segmental/subsegmental arteries of the RIGHT lower lobe and subsegmental lingula, likely representing pulmonary thromboembolism in patient with history of DVT.  The radiologist called and stated he thinks she also see's clot in the DAYTON however this was not commented in the text portion.  She's on current treatment for DVT of R superficial femoral vein on eliquis which is treatment for PE, will continue current regimen and per cardiology recommendation to check transesophageal echo in about a month's time to see if the DAYTON clot has dissipated.  They are also recommending a 30 day holter to r/o underlying Afib as possible cause for DAYTON clot.   With the new findings, hypercoaguable work up underway, and heme/ onc consulted.  They recommend changing to lovenox for now as they are unsure if she just failed therapy or is because she missed several doses.  Tumor panel sent and Ca 19-9 severely elevated >52k. AFP 3.6.  Normal.  7/14 MRI of ABD: Large infiltrative process involving the left liver concerning for malignancy including cholangiocarcinoma.  Biopsy recommended. Hepatology on board, ABD US with doppler no proof of portal vein obstruction.  Needs biopsy of L hepatic lobe,  advanced endoscopic service unable to get to left lobe for biopsy.  Discussed with interventional radiology and they are willing to perform next week as outpatient as she's hemodynamically stable. Since she forgets her meds on occasion the pharm D decided to treat her PE's with lovenox at 1.5mg/kg per day.  She is to take the lovenox the early am the day before her biopsy and then resume only when IR states or within 24 hrs if no complications noted.  She has f/u scheduled with heme/once and hepatology.  Cardiology wanted 30 day holter to just r/o afib as concern for DAYTON thrombus was verbally noted by radiologist.  While in house she was in SR the whole time.  A holter was ordered for discharge however they are unavailable on the weekends, someone will contact her on Monday.

## 2017-07-14 NOTE — PLAN OF CARE
Problem: Patient Care Overview  Goal: Plan of Care Review  Outcome: Ongoing (interventions implemented as appropriate)  Plan of care reviewed with pt. VS stable. No acute events at this time. No complaints. Pt started on q12h Lovenox to improve anticoagulation for PE's. Pt remains free from falls or injury. Bed low and locked, call light and personal belongings within reach. Will continue to monitor. Barbara Dias RN

## 2017-07-14 NOTE — PROGRESS NOTES
[facility] Hospital Medicine  Progress Note    Patient Name: Andra Matamoros  MRN: [id number]  Patient Class: IP- Inpatient   Admission Date: 7/12/2017  Length of Stay: 1 days  Attending Physician: Floresita Baird MD  Primary Care Provider: Wesly Parr MD    [facility] Medicine Team: [facility]OSP MED BONY Rodriguez NP    Subjective:     Principal Problem:Demand ischemia    HPI:  67 y/o female, who presents to the ED with c/o fatigue, abdominal bloating, poor appetite, persistent cough, and recent 20lbs weight loss.  She has a PMH of elevated liver enzymes and alkaline phosphatase for which she is followed by outpatient hepatology Dr. Sanches.  She recently underwent CT of the abdomen which was suggestive of a large hypodense area involving the majority of the left hepatic lobe.  She is currently scheduled for outpatient MRI of abdomen with contrast 7/13/17 for further evaluation.  She recently underwent a liver biopsy roughly one month ago and reportedly was unremarkable.  This is her second liver biopsy.  Additionally she was recently evaluated for RLE swelling and pain and was found to have a large superficial thrombus and was started on apixaban. During her initial work up in the ED reveals alkaline phosphatase 564, , , and a troponin of 0.735.  She denies chest pain or SOB.  She states that she has had LUE pain primarily at the elbow and occasional chest tightness.  She states that her fatigue has worsened to the point that she cannot perform her normal activities and often requires rest breaks and naps throughout the day.    Family at bedside state that these breaks and naps are very unusual for her and that she is normally hyper.  She attributes her weight loss to abdominal bloating and feeling of early satiety.  She states that feeling of bloating worsen as the day goes on and by dinner she has little to no appetite. She recently underwent EGD which was reportedly  negative for acute findings. She denies recent illness, fever, chills, N/V/D, or  symptoms.     Hospital Course:  7/12 admitted to hospital medicine for progressive and excessive fatigue, intermittent L arm pain and back pain.  She noted her symptoms have progressed rapidly since her liver biopsy in June 2017, which was performed due to elevated LFT's, recent 20 lb weight loss and it was inconclusive.  She was due for an MRI today of her liver, however since she's inpatient, this was discussed with hepatology and this can be rescheduled in a week or two, while she's being worked up for her troponinemia, L arm/ back pain.  Cards consult saw in ED, felt this was demand ischemia, and that PE could be considered on the differential.  D dimer was ordered and it was 3.19, so CTA PE protocol revealed bilateral intraluminal filling defects including the superior segmental/subsegmental arteries of the RIGHT lower lobe and subsegmental lingula, likely representing pulmonary thromboembolism in patient with history of DVT.  The radiologist called and stated he thinks she also see's clot in the DAYTON however this was not commented in the text portion.  She's on current treatment for DVT of R superficial femoral vein on eliquis which is treatment for PE, will continue current regimen and per cardiology recommendation to check transesophageal echo in about a month's time to see if the DAYTON clot has dissipated.  They are also recommending a 30 day holter to r/o underlying Afib as possible cause for DAYTON clot.   With the new findings, hypercoaguable work up underway, and heme/ onc consulted.  They recommend changing to lovenox for now as they are unsure if she just failed therapy or is because she missed several doses.  Tumor panel sent and Ca 19-9 severely elevated >52k. AFP 3.6.  Normal.  7/14 MRI of ABD: Large infiltrative process involving the left liver concerning for malignancy including cholangiocarcinoma.  Biopsy recommended.  Hepatology on board, ordering ABD US with doppler.      Interval History: Anxious to get testing underway to see if she has cancer or not. Asking for the MRI of abd.     Review of Systems   Constitutional: Positive for activity change, appetite change, fatigue and unexpected weight change. Negative for chills and fever.   HENT: Positive for trouble swallowing. Negative for congestion and sinus pressure.    Respiratory: Positive for cough. Negative for shortness of breath and wheezing.    Cardiovascular: Positive for leg swelling. Negative for chest pain and palpitations.   Gastrointestinal: Positive for abdominal distention, constipation and nausea. Negative for abdominal pain, diarrhea and vomiting.   Endocrine: Negative.    Genitourinary: Negative for difficulty urinating.   Skin: Negative.    Allergic/Immunologic: Negative.    Neurological: Negative for dizziness, weakness, numbness and headaches.   Hematological: Negative.    Psychiatric/Behavioral: Negative.      Objective:     Vital Signs (Most Recent):  Temp: 97.7 °F (36.5 °C) (07/14/17 0800)  Pulse: 103 (07/14/17 1600)  Resp: (!) 26 (07/14/17 1600)  BP: 107/81 (07/14/17 1600)  SpO2: 98 % (07/14/17 1600) Vital Signs (24h Range):  Temp:  [97.7 °F (36.5 °C)-97.9 °F (36.6 °C)] 97.7 °F (36.5 °C)  Pulse:  [] 103  Resp:  [17-26] 26  SpO2:  [97 %-99 %] 98 %  BP: ()/(65-82) 107/81     Weight: 58.8 kg (129 lb 10.1 oz)  Body mass index is 22.25 kg/m².    Intake/Output Summary (Last 24 hours) at 07/14/17 2068  Last data filed at 07/14/17 1300   Gross per 24 hour   Intake              420 ml   Output             1130 ml   Net             -710 ml      Physical Exam   Constitutional: She is oriented to person, place, and time. She appears well-developed and well-nourished. No distress.   HENT:   Head: Normocephalic and atraumatic.   Nose: Nose normal.   Eyes: EOM are normal.   Neck: Normal range of motion. Neck supple. No JVD present.   Cardiovascular: Normal  rate, regular rhythm, normal heart sounds and intact distal pulses.  Exam reveals no gallop and no friction rub.    No murmur heard.  Pulmonary/Chest: Effort normal and breath sounds normal. No respiratory distress. She has no wheezes. She has no rales.   Abdominal: Soft. Bowel sounds are normal. She exhibits distension. She exhibits no mass. There is tenderness (RUQ). There is no guarding.   Musculoskeletal: Normal range of motion. She exhibits no edema.   Lymphadenopathy:     She has no cervical adenopathy.   Neurological: She is alert and oriented to person, place, and time. No cranial nerve deficit. She exhibits abnormal muscle tone.   Skin: Skin is warm and dry. Capillary refill takes less than 2 seconds. No rash noted. No erythema. No pallor.   Psychiatric: She has a normal mood and affect. Her behavior is normal. Judgment and thought content normal.       Significant Labs:   BMP:   Recent Labs  Lab 07/13/17  0606 07/14/17  0636     --  101     --  136   K 4.2  --  4.7     --  101   CO2 27  --  27   BUN 19  --  15   CREATININE 0.7  < > 0.8   CALCIUM 9.2  --  9.4   MG 2.0  --   --    < > = values in this interval not displayed.  CBC:   Recent Labs  Lab 07/13/17  0606 07/14/17  0636   WBC 7.61 8.51   HGB 11.7* 11.7*   HCT 36.2* 36.1*    222     CMP:   Recent Labs  Lab 07/13/17  0606 07/13/17  1305 07/14/17  0636     --  136   K 4.2  --  4.7     --  101   CO2 27  --  27     --  101   BUN 19  --  15   CREATININE 0.7 0.7 0.8   CALCIUM 9.2  --  9.4   PROT 7.0  --  7.3   ALBUMIN 3.0*  --  3.1*   BILITOT 0.5  --  0.6   ALKPHOS 548*  --  670*   *  --  128*   *  --  146*   ANIONGAP 6*  --  8   EGFRNONAA >60.0 >60.0 >60.0     Magnesium:   Recent Labs  Lab 07/13/17  0606   MG 2.0       Significant Imaging: I have reviewed and interpreted all pertinent imaging results/findings within the past 24 hours.     MRI Abd:   Large infiltrative process involving the left  liver concerning for malignancy including cholangiocarcinoma.  Biopsy recommended.    Abd US with doppler: ordered pending    CTA: 1. Pulmonary arteries demonstrate bilateral intraluminal filling defects including the superior segmental/subsegmental arteries of the RIGHT lower lobe and subsegmental lingula, likely representing pulmonary thromboembolism in patient with history of DVT.      2.  Heterogenous primarily hypodense area involving the majority of the LEFT hepatic lobe without visualization of the LEFT portal vein.  Unchanged from prior CT abdomen and pelvis on 6/21/2017.    Assessment/Plan:      * Demand ischemia secondary to PE    Elevated troponin  - initial troponin 0.735 - EKG with no obvious ST elevation or depression  - cardiology consulted - appreciate rec's  - trop flat, demand ischemia from PE's  - no previous ECHO on record - ECHO see below  - currently on apixaban for RLE superficial thrombus - now with PE's ? DAYTON clot and ? hepatic thrombosis, will switch to lovenox for treatment dosing  Echo:  1 - Normal left ventricular systolic function (EF 60-65%).     2 - Normal left ventricular diastolic function.     3 - Normal right ventricular systolic function .     4 - The estimated PA systolic pressure is greater than 17 mmHg.         Liver mass, left lobe    - see MRI results above, ? Biopsy inpatient vs out, will discuss with hepatology in am.           Elevated tumor markers    CA-19-9 > 52k  AFP normal, awaiting on labs ordered per heme/onc          Acute pulmonary embolism bilateral    - CA 19-9 elevated > 52 K, concern that this was not necessarily d/t DVT showering vs in situ thrombosis from hypercoagulable state such that is found in malignancy  - heme/ onc consulted appreciate their expertise, recommending to switch to lovenox as concern that the eliquis is either fail or ineffective vs poor compliance.  - PAP very low, echo with no RV compromise, so hemodynamically insignificant PE's no  need for cath lab and thrombolytics at this time          Superficial thrombosis of right lower extremity    - U/S 6/29/17 subacute superficial thrombus of the GSV from the SFJ to below the knee  - change apixaban to lovenox per Dr. Hobbs heme/onc as directed          Elevated troponin    - due to PE          Dyspepsia    - continue pantoprazole as directed    - added lactobacillus/ digestive enzymes/ simethicone   - ? Pancreatic vs choleangiocarcinoma        Elevated liver enzymes    Elevated alkaline phosphatase level - followed by Dr. Sanches outpatient hepatology   -  /  (baseline AST 28-36 / ALT 22-56)  - Alk Phos 564 (baseline 124-345)  - Hepatology consulted - see above  - CT abdomen performed 6/21/17 with interval development of heterogeneous primarily hypodense area involving the majority of the left hepatic lobe, hepatomegaly, and mild splenomegaly.             Elevated alkaline phosphatase level    - scheduled for outpatient for today  - Consulted Hepatology   - MRI Abd:   Large infiltrative process involving the left liver concerning for malignancy including cholangiocarcinoma.  Biopsy recommended.  - ABD us doppler pending            VTE Risk Mitigation         Ordered     Place BA hose  Until discontinued      07/12/17 2136     Place sequential compression device  Until discontinued      07/12/17 2136     Reason for No Pharmacological VTE Prophylaxis  Once      07/12/17 2136     Medium Risk of VTE  Once      07/12/17 2136          Yodit Rodriguez NP  Department of Hospital Medicine   [facility]

## 2017-07-14 NOTE — CONSULTS
[facility]  Hepatology  Consult Note    Patient Name: Andra Matamoros  MRN: [id number]  Admission Date: 7/12/2017  Hospital Length of Stay: 1 days  Attending Provider: Floresita Baird MD   Primary Care Physician: Wesly Parr MD  Principal Problem:Demand ischemia    Inpatient consult to Hepatology  Consult performed by: MARIANN MEEKS  Consult ordered by: MARIANN MEEKS  Reason for consult: elevated ALP        Subjective:     Transplant status: No    HPI:  Andra Matamoros is a 66 y.o. female with 67 y/o female with transaminitis (ALT/AST 100s) with ALP 500s being followed by Dr. Sanches, negative liver biopsy, RLE DVT on Eliquis. She presented to the ED with c/o fatigue, abdominal bloating, poor appetite, persistent cough, and recent 20lbs weight loss on 7/12/2017. She recently underwent CT of the abdomen which was suggestive of a large hypodense area involving the majority of the left hepatic lobe concerning for infarction and was scheduled for outpatient MRI of abdomen with contrast 7/13/17 for further evaluation.  During her initial work up in the ED reveals alkaline phosphatase 564, , , and a troponin of 0.735.   CTA was done showed bilateral PE. Tumor panel sent and Ca 19-9 severely elevated >52k. AFP 3.6.  normal . Hematology, Cardiology was consulted. Hepatology also consulted for further evaluation.         Review of Systems   Constitutional: Positive for activity change, appetite change and unexpected weight change.   HENT: Negative for trouble swallowing.    Respiratory: Positive for shortness of breath.    Gastrointestinal: Positive for abdominal distention and abdominal pain. Negative for anal bleeding, blood in stool, constipation, diarrhea, nausea, rectal pain and vomiting.   Musculoskeletal: Negative for arthralgias and back pain.   Skin: Negative for color change.   Neurological: Negative for seizures and numbness.    Psychiatric/Behavioral: Negative for sleep disturbance and suicidal ideas.       Past Medical History:   Diagnosis Date    Blood clot in vein     right lower extremity       Past Surgical History:   Procedure Laterality Date     SECTION      CHOLECYSTECTOMY      TONSILLECTOMY         Family history of liver disease: Yes    Review of patient's allergies indicates:  No Known Allergies    Social History Main Topics    Smoking status: Never Smoker    Smokeless tobacco: Never Used    Alcohol use Yes      Comment: Grand Nguyen garcia    Drug use: No    Sexual activity: Not Currently     Partners: Male     Birth control/ protection: Post-menopausal       Prescriptions Prior to Admission   Medication Sig Dispense Refill Last Dose    apixaban 5 mg Tab Take 1 tablet (5 mg total) by mouth 2 (two) times daily. 60 tablet 2 2017       Objective:     Vital Signs (Most Recent):  Temp: 97.7 °F (36.5 °C) (17 0800)  Pulse: (!) 124 (17 1215)  Resp: 17 (17 1215)  BP: 108/82 (17 1215)  SpO2: 99 % (17 1215) Vital Signs (24h Range):  Temp:  [97.7 °F (36.5 °C)-98.1 °F (36.7 °C)] 97.7 °F (36.5 °C)  Pulse:  [] 124  Resp:  [17-23] 17  SpO2:  [96 %-99 %] 99 %  BP: ()/(64-82) 108/82     Weight: 58.8 kg (129 lb 10.1 oz) (17 2215)  Body mass index is 22.25 kg/m².    Physical Exam   Constitutional: She is oriented to person, place, and time. She appears well-developed and well-nourished.   HENT:   Head: Normocephalic and atraumatic.   Eyes: Conjunctivae are normal. No scleral icterus.   Neck: Neck supple.   Cardiovascular: Normal rate and regular rhythm.    No murmur heard.  Pulmonary/Chest: Effort normal. No stridor. No respiratory distress. She has no wheezes. She has no rales.   Abdominal: Soft. Bowel sounds are normal. She exhibits distension. She exhibits no mass. There is no tenderness. There is no rebound and no guarding. No hernia.   Musculoskeletal: She exhibits  no edema or tenderness.   Neurological: She is alert and oriented to person, place, and time.   Skin: Skin is warm and dry.   Psychiatric: She has a normal mood and affect.   anxious   Vitals reviewed.      MELD-Na score: 6 at 7/14/2017  6:36 AM  MELD score: 6 at 7/14/2017  6:36 AM  Calculated from:  Serum Creatinine: 0.8 mg/dL (Rounded to 1) at 7/14/2017  6:36 AM  Serum Sodium: 136 mmol/L at 7/14/2017  6:36 AM  Total Bilirubin: 0.6 mg/dL (Rounded to 1) at 7/14/2017  6:36 AM  INR(ratio): 1.0 at 7/13/2017  5:17 PM  Age: 66 years    Lab Results   Component Value Date    WBC 8.51 07/14/2017    HGB 11.7 (L) 07/14/2017    HCT 36.1 (L) 07/14/2017    MCV 85 07/14/2017     07/14/2017     Lab Results   Component Value Date     07/14/2017    K 4.7 07/14/2017     07/14/2017    CO2 27 07/14/2017    BUN 15 07/14/2017    CREATININE 0.8 07/14/2017     Lab Results   Component Value Date    ALBUMIN 3.1 (L) 07/14/2017     (H) 07/14/2017     (H) 07/14/2017     (H) 02/14/2017    ALKPHOS 670 (H) 07/14/2017    BILITOT 0.6 07/14/2017     Lab Results   Component Value Date    AFP 3.6 07/13/2017    LIPASE 27 07/12/2017    AMYLASE 24 07/16/2010       Imaging:  Reviewed and as noted in HPI.         Assessment/Plan:     Elevated alkaline phosphatase level    Hypercoagulable state from likely undiagnosed cancer.   CA 19.9 is >50K, likely pancreatobiliary origin. Normal AFP.  Liver biopsy is unremarkable.     Plan:  Recommend MRI when patient is clinically stable.  Recommend repeat US with dopplers.          Elevated liver enzymes    See above            Thank you for your consult. I will follow-up with patient. Please contact us if you have any additional questions.    Hayden Melendez MD  Hepatology  [facility]

## 2017-07-14 NOTE — PLAN OF CARE
Met with pt. Her daughter is an RN who used to work here at [facility]  Good family support  PCP Karolyn  Pt states she might need lovenox or eloquis but doctors uncertain of plan at this time.     07/14/17 1012   Discharge Assessment   Assessment Type Discharge Planning Assessment   Confirmed/corrected address and phone number on facesheet? Yes   Assessment information obtained from? Patient;Medical Record   Expected Length of Stay (days) 3   Communicated expected length of stay with patient/caregiver yes   Prior to hospitilization cognitive status: Alert/Oriented;No Deficits   Prior to hospitalization functional status: Independent   Current cognitive status: Alert/Oriented;No Deficits   Current Functional Status: Independent   Arrived From home or self-care   Lives With spouse   Able to Return to Prior Arrangements yes   Is patient able to care for self after discharge? Yes   How many people do you have in your home that can help with your care after discharge? 2   Who are your caregiver(s) and their phone number(s)? spouse Mirella and daughter Loni who was an RN here at [facility] several yrs ago.  Mirella [phone]  Loni  [phone]   Patient's perception of discharge disposition home or selfcare   Readmission Within The Last 30 Days no previous admission in last 30 days   Patient currently being followed by outpatient case management? No   Patient currently receives home health services? No   Does the patient currently use HME? No   Patient currently receives private duty nursing? N/A   Patient currently receives any other outside agency services? No   Equipment Currently Used at Home none   Do you have any problems affording any of your prescribed medications? No   Is the patient taking medications as prescribed? yes   Do you have any financial concerns preventing you from receiving the healthcare you need? No   Does the patient have transportation to healthcare appointments? Yes   Transportation Available  family or friend will provide   On Dialysis? No   Does the patient receive services at the Coumadin Clinic? No   Are there any open cases? No   Discharge Plan A Home with family   Discharge Plan B Home with family   Patient/Family In Agreement With Plan yes

## 2017-07-14 NOTE — ASSESSMENT & PLAN NOTE
- CA 19-9 elevated > 52 K, concern that this was not necessarily d/t DVT showering vs in situ thrombosis from hypercoagulable state such that is found in malignancy  - heme/ onc consulted appreciate their expertise, recommending to switch to lovenox as concern that the eliquis is either fail or ineffective vs poor compliance.  - PAP very low, echo with no RV compromise, so hemodynamically insignificant PE's no need for cath lab and thrombolytics at this time

## 2017-07-14 NOTE — SUBJECTIVE & OBJECTIVE
Interval History: I just want answers, I just can't tolerate this fatigue anymore, nor the intermittent arm pain and back pain.     Review of Systems   Constitutional: Positive for activity change, appetite change, fatigue and unexpected weight change. Negative for chills and fever.   HENT: Positive for trouble swallowing. Negative for congestion and sinus pressure.    Respiratory: Positive for cough. Negative for shortness of breath and wheezing.    Cardiovascular: Positive for leg swelling. Negative for chest pain and palpitations.   Gastrointestinal: Positive for abdominal distention, constipation and nausea. Negative for abdominal pain, diarrhea and vomiting.   Endocrine: Negative.    Genitourinary: Negative for difficulty urinating.   Skin: Negative.    Allergic/Immunologic: Negative.    Neurological: Negative for dizziness, weakness, numbness and headaches.   Hematological: Negative.    Psychiatric/Behavioral: Negative.      Objective:     Vital Signs (Most Recent):  Temp: 97.9 °F (36.6 °C) (07/13/17 1916)  Pulse: 101 (07/13/17 2200)  Resp: (!) 22 (07/13/17 1916)  BP: 94/65 (07/13/17 1916)  SpO2: 97 % (07/13/17 1916) Vital Signs (24h Range):  Temp:  [97.8 °F (36.6 °C)-98.2 °F (36.8 °C)] 97.9 °F (36.6 °C)  Pulse:  [] 101  Resp:  [15-24] 22  SpO2:  [96 %-98 %] 97 %  BP: (93-99)/(64-68) 94/65     Weight: 58.8 kg (129 lb 10.1 oz)  Body mass index is 22.25 kg/m².    Intake/Output Summary (Last 24 hours) at 07/13/17 2227  Last data filed at 07/13/17 1618   Gross per 24 hour   Intake              660 ml   Output              550 ml   Net              110 ml      Physical Exam   Constitutional: She is oriented to person, place, and time. She appears well-developed and well-nourished. No distress.   HENT:   Head: Normocephalic and atraumatic.   Nose: Nose normal.   Eyes: EOM are normal.   Neck: Normal range of motion. Neck supple. No JVD present.   Cardiovascular: Normal rate, regular rhythm, normal heart sounds  and intact distal pulses.  Exam reveals no gallop and no friction rub.    No murmur heard.  Pulmonary/Chest: Effort normal and breath sounds normal. No respiratory distress. She has no wheezes. She has no rales.   Abdominal: Soft. Bowel sounds are normal. She exhibits distension. She exhibits no mass. There is tenderness (RUQ). There is no guarding.   Musculoskeletal: Normal range of motion. She exhibits no edema.   Lymphadenopathy:     She has no cervical adenopathy.   Neurological: She is alert and oriented to person, place, and time. No cranial nerve deficit. She exhibits abnormal muscle tone.   Skin: Skin is warm and dry. Capillary refill takes less than 2 seconds. No rash noted. No erythema. No pallor.   Psychiatric: She has a normal mood and affect. Her behavior is normal. Judgment and thought content normal.       Significant Labs:   Recent Lab Results       07/13/17  1717 07/13/17  1716 07/13/17  1305 07/13/17  0943 07/13/17  0720      AFP  3.6        Albumin          Alkaline Phosphatase          ALT          Anion Gap          aPTT 21.6  Comment:  aPTT therapeutic range = 39-69 seconds         AST          Baso #          Basophil%          Total Bilirubin          BUN, Bld          CA 19-9  12690.0(H)        Calcium          Chloride          CO2          Creatinine   0.7       D-Dimer    3.19  Comment:  The quantitative D-dimer assay should be used as an aid in   the diagnosis of deep vein thrombosis and pulmonary embolism  in patients with the appropriate presentation and clinical  history. Causes of a positive (>0.50 mg/L FEU) D-Dimer test  include, but are not limited to: DVT, PE, DIC, thrombolytic   therapy, anticoagulant therapy, recent surgery, trauma, or   pregnancy, disseminated malignancy, aortic aneurysm, cirrhosis,  and severe infection. False negative results may occur in   patients with distal DVT.  (H)      Diastolic Dysfunction     No     Differential Method          EF     60     eGFR if     >60.0       eGFR if non    >60.0  Comment:  Calculation used to obtain the estimated glomerular filtration  rate (eGFR) is the CKD-EPI equation. Since race is unknown   in our information system, the eGFR values for   -American and Non--American patients are given   for each creatinine result.         Eos #          Eosinophil%          Glucose          Gran #          Gran%          Hematocrit          Hemoglobin          Coumadin Monitoring INR 1.0  Comment:  Coumadin Therapy:  2.0 - 3.0 for INR for all indicators except mechanical heart valves  and antiphospholipid syndromes which should use 2.5 - 3.5.           Lymph #          Lymph%          Magnesium          MCH          MCHC          MCV          Mono #          Mono%          MPV          Mitral Valve Regurgitation     TRIVIAL     Est. PA Systolic Pressure     16.81     Phosphorus          Platelets          Potassium          Total Protein          Protime 11.1         RBC          RDW          Sodium          Troponin I    0.318  Comment:  The reference interval for Troponin I represents the 99th percentile   cutoff   for our facility and is consistent with 3rd generation assay   performance.  (H)      Tricuspid Valve Regurgitation     TRIVIAL     WBC                      07/13/17  0606 07/12/17  2335      AFP       Albumin 3.0(L)      Alkaline Phosphatase 548(H)      (H)      Anion Gap 6(L)      aPTT       (H)      Baso # 0.01      Basophil% 0.1      Total Bilirubin 0.5  Comment:  For infants and newborns, interpretation of results should be based  on gestational age, weight and in agreement with clinical  observations.  Premature Infant recommended reference ranges:  Up to 24 hours.............<8.0 mg/dL  Up to 48 hours............<12.0 mg/dL  3-5 days..................<15.0 mg/dL  6-29 days.................<15.0 mg/dL        BUN, Bld 19      CA 19-9       Calcium 9.2      Chloride 105       CO2 27      Creatinine 0.7      D-Dimer       Diastolic Dysfunction       Differential Method Automated      EF       eGFR if African American >60.0      eGFR if non  >60.0  Comment:  Calculation used to obtain the estimated glomerular filtration  rate (eGFR) is the CKD-EPI equation. Since race is unknown   in our information system, the eGFR values for   -American and Non--American patients are given   for each creatinine result.        Eos # 0.2      Eosinophil% 2.0      Glucose 103      Gran # 5.8      Gran% 76.6(H)      Hematocrit 36.2(L)      Hemoglobin 11.7(L)      Coumadin Monitoring INR       Lymph # 0.8(L)      Lymph% 10.9(L)      Magnesium 2.0      MCH 27.3      MCHC 32.3      MCV 85      Mono # 0.8      Mono% 10.0      MPV 9.6      Mitral Valve Regurgitation       Est. PA Systolic Pressure       Phosphorus 3.7      Platelets 172      Potassium 4.2      Total Protein 7.0      Protime       RBC 4.28      RDW 13.9      Sodium 138      Troponin I 0.421  Comment:  The reference interval for Troponin I represents the 99th percentile   cutoff   for our facility and is consistent with 3rd generation assay   performance.  (H) 0.607  Comment:  The reference interval for Troponin I represents the 99th percentile   cutoff   for our facility and is consistent with 3rd generation assay   performance.  (H)     Tricuspid Valve Regurgitation       WBC 7.61            Significant Imaging: CT: I have reviewed all pertinent results/findings within the past 24 hours and my personal findings are:  as see below  I have reviewed and interpreted all pertinent imaging results/findings within the past 24 hours.     CTA PE protocol:  1. Pulmonary arteries demonstrate bilateral intraluminal filling defects including the superior segmental/subsegmental arteries of the RIGHT lower lobe and subsegmental lingula, likely representing pulmonary thromboembolism in patient with history of DVT.      2.   Heterogenous primarily hypodense area involving the majority of the LEFT hepatic lobe without visualization of the LEFT portal vein.  Unchanged from prior CT abdomen and pelvis on 6/21/2017.

## 2017-07-14 NOTE — ASSESSMENT & PLAN NOTE
- U/S 6/29/17 subacute superficial thrombus of the GSV from the SFJ to below the knee  - change apixaban to lovenox per Dr. Hobbs heme/onc as directed

## 2017-07-14 NOTE — PROGRESS NOTES
[facility] Hospital Medicine  Progress Note    Patient Name: Andra Matamoros  MRN: [id number]  Patient Class: OP- Observation   Admission Date: 7/12/2017  Length of Stay: 1 days  Attending Physician: Floresita Baird MD  Primary Care Provider: Wesly Parr MD    [facility] Medicine Team: [facility] [place] [facility] BONY Rodriguez NP    Subjective:     Principal Problem:Demand ischemia    HPI:  65 y/o female, who presents to the ED with c/o fatigue, abdominal bloating, poor appetite, persistent cough, and recent 20lbs weight loss.  She has a PMH of elevated liver enzymes and alkaline phosphatase for which she is followed by outpatient hepatology Dr. Sanches.  She recently underwent CT of the abdomen which was suggestive of a large hypodense area involving the majority of the left hepatic lobe.  She is currently scheduled for outpatient MRI of abdomen with contrast 7/13/17 for further evaluation.  She recently underwent a liver biopsy roughly one month ago and reportedly was unremarkable.  This is her second liver biopsy.  Additionally she was recently evaluated for RLE swelling and pain and was found to have a large superficial thrombus and was started on apixaban. During her initial work up in the ED reveals alkaline phosphatase 564, , , and a troponin of 0.735.  She denies chest pain or SOB.  She states that she has had LUE pain primarily at the elbow and occasional chest tightness.  She states that her fatigue has worsened to the point that she cannot perform her normal activities and often requires rest breaks and naps throughout the day.    Family at bedside state that these breaks and naps are very unusual for her and that she is normally hyper.  She attributes her weight loss to abdominal bloating and feeling of early satiety.  She states that feeling of bloating worsen as the day goes on and by dinner she has little to no appetite. She recently underwent EGD which was reportedly  negative for acute findings. She denies recent illness, fever, chills, N/V/D, or  symptoms.     Hospital Course:  7/12 admitted to hospital medicine for progressive and excessive fatigue, intermittent L arm pain and back pain.  She noted her symptoms have progressed rapidly since her liver biopsy in June 2017, which was performed due to elevated LFT's, recent 20 lb weight loss and it was inconclusive.  She was due for an MRI today of her liver, however since she's inpatient, this was discussed with hepatology and this can be rescheduled in a week or two, while she's being worked up for her troponinemia, L arm/ back pain.  Cards consult saw in ED, felt this was demand ischemia, and that PE could be considered on the differential.  D dimer was ordered and it was 3.19, so CTA PE protocol revealed bilateral intraluminal filling defects including the superior segmental/subsegmental arteries of the RIGHT lower lobe and subsegmental lingula, likely representing pulmonary thromboembolism in patient with history of DVT.  The radiologist called and stated he thinks she also see's clot in the [place] however this was not commented in the text portion.  She's on current treatment for DVT of R superficial femoral vein on eliquis which is treatment for PE, will continue current regimen and per cardiology recommendation to check transesophageal echo in about a month's time to see if the [place] clot has dissipated.  They are also recommending a 30 day holter to r/o underlying Afib as possible cause for [place] clot.   With the new findings, hypercoaguable work up underway, and heme/ onc consulted.  They recommend changing to lovenox for now as they are unsure if she just failed therapy or is because she missed several doses.  Tumor panel sent and Ca 19-9 severely elevated >52k. AFP 3.6.  normal         Interval History: I just want answers, I just can't tolerate this fatigue anymore, nor the intermittent arm pain and back pain.     Review  of Systems   Constitutional: Positive for activity change, appetite change, fatigue and unexpected weight change. Negative for chills and fever.   HENT: Positive for trouble swallowing. Negative for congestion and sinus pressure.    Respiratory: Positive for cough. Negative for shortness of breath and wheezing.    Cardiovascular: Positive for leg swelling. Negative for chest pain and palpitations.   Gastrointestinal: Positive for abdominal distention, constipation and nausea. Negative for abdominal pain, diarrhea and vomiting.   Endocrine: Negative.    Genitourinary: Negative for difficulty urinating.   Skin: Negative.    Allergic/Immunologic: Negative.    Neurological: Negative for dizziness, weakness, numbness and headaches.   Hematological: Negative.    Psychiatric/Behavioral: Negative.      Objective:     Vital Signs (Most Recent):  Temp: 97.9 °F (36.6 °C) (07/13/17 1916)  Pulse: 101 (07/13/17 2200)  Resp: (!) 22 (07/13/17 1916)  BP: 94/65 (07/13/17 1916)  SpO2: 97 % (07/13/17 1916) Vital Signs (24h Range):  Temp:  [97.8 °F (36.6 °C)-98.2 °F (36.8 °C)] 97.9 °F (36.6 °C)  Pulse:  [] 101  Resp:  [15-24] 22  SpO2:  [96 %-98 %] 97 %  BP: (93-99)/(64-68) 94/65     Weight: 58.8 kg (129 lb 10.1 oz)  Body mass index is 22.25 kg/m².    Intake/Output Summary (Last 24 hours) at 07/13/17 2227  Last data filed at 07/13/17 1618   Gross per 24 hour   Intake              660 ml   Output              550 ml   Net              110 ml      Physical Exam   Constitutional: She is oriented to person, place, and time. She appears well-developed and well-nourished. No distress.   HENT:   Head: Normocephalic and atraumatic.   Nose: Nose normal.   Eyes: EOM are normal.   Neck: Normal range of motion. Neck supple. No JVD present.   Cardiovascular: Normal rate, regular rhythm, normal heart sounds and intact distal pulses.  Exam reveals no gallop and no friction rub.    No murmur heard.  Pulmonary/Chest: Effort normal and breath  sounds normal. No respiratory distress. She has no wheezes. She has no rales.   Abdominal: Soft. Bowel sounds are normal. She exhibits distension. She exhibits no mass. There is tenderness (RUQ). There is no guarding.   Musculoskeletal: Normal range of motion. She exhibits no edema.   Lymphadenopathy:     She has no cervical adenopathy.   Neurological: She is alert and oriented to person, place, and time. No cranial nerve deficit. She exhibits abnormal muscle tone.   Skin: Skin is warm and dry. Capillary refill takes less than 2 seconds. No rash noted. No erythema. No pallor.   Psychiatric: She has a normal mood and affect. Her behavior is normal. Judgment and thought content normal.       Significant Labs:   Recent Lab Results       07/13/17  1717 07/13/17  1716 07/13/17  1305 07/13/17  0943 07/13/17  0720      AFP  3.6        Albumin          Alkaline Phosphatase          ALT          Anion Gap          aPTT 21.6  Comment:  aPTT therapeutic range = 39-69 seconds         AST          Baso #          Basophil%          Total Bilirubin          BUN, Bld          CA 19-9  72389.0(H)        Calcium          Chloride          CO2          Creatinine   0.7       D-Dimer    3.19  Comment:  The quantitative D-dimer assay should be used as an aid in   the diagnosis of deep vein thrombosis and pulmonary embolism  in patients with the appropriate presentation and clinical  history. Causes of a positive (>0.50 mg/L FEU) D-Dimer test  include, but are not limited to: DVT, PE, DIC, thrombolytic   therapy, anticoagulant therapy, recent surgery, trauma, or   pregnancy, disseminated malignancy, aortic aneurysm, cirrhosis,  and severe infection. False negative results may occur in   patients with distal DVT.  (H)      Diastolic Dysfunction     No     Differential Method          EF     60     eGFR if    >60.0       eGFR if non    >60.0  Comment:  Calculation used to obtain the estimated glomerular  filtration  rate (eGFR) is the CKD-EPI equation. Since race is unknown   in our information system, the eGFR values for   -American and Non--American patients are given   for each creatinine result.         Eos #          Eosinophil%          Glucose          Gran #          Gran%          Hematocrit          Hemoglobin          Coumadin Monitoring INR 1.0  Comment:  Coumadin Therapy:  2.0 - 3.0 for INR for all indicators except mechanical heart valves  and antiphospholipid syndromes which should use 2.5 - 3.5.           Lymph #          Lymph%          Magnesium          MCH          MCHC          MCV          Mono #          Mono%          MPV          Mitral Valve Regurgitation     TRIVIAL     Est. PA Systolic Pressure     16.81     Phosphorus          Platelets          Potassium          Total Protein          Protime 11.1         RBC          RDW          Sodium          Troponin I    0.318  Comment:  The reference interval for Troponin I represents the 99th percentile   cutoff   for our facility and is consistent with 3rd generation assay   performance.  (H)      Tricuspid Valve Regurgitation     TRIVIAL     WBC                      07/13/17  0606 07/12/17  2335      AFP       Albumin 3.0(L)      Alkaline Phosphatase 548(H)      (H)      Anion Gap 6(L)      aPTT       (H)      Baso # 0.01      Basophil% 0.1      Total Bilirubin 0.5  Comment:  For infants and newborns, interpretation of results should be based  on gestational age, weight and in agreement with clinical  observations.  Premature Infant recommended reference ranges:  Up to 24 hours.............<8.0 mg/dL  Up to 48 hours............<12.0 mg/dL  3-5 days..................<15.0 mg/dL  6-29 days.................<15.0 mg/dL        BUN, Bld 19      CA 19-9       Calcium 9.2      Chloride 105      CO2 27      Creatinine 0.7      D-Dimer       Diastolic Dysfunction       Differential Method Automated      EF       eGFR if   >60.0      eGFR if non  >60.0  Comment:  Calculation used to obtain the estimated glomerular filtration  rate (eGFR) is the CKD-EPI equation. Since race is unknown   in our information system, the eGFR values for   -American and Non--American patients are given   for each creatinine result.        Eos # 0.2      Eosinophil% 2.0      Glucose 103      Gran # 5.8      Gran% 76.6(H)      Hematocrit 36.2(L)      Hemoglobin 11.7(L)      Coumadin Monitoring INR       Lymph # 0.8(L)      Lymph% 10.9(L)      Magnesium 2.0      MCH 27.3      MCHC 32.3      MCV 85      Mono # 0.8      Mono% 10.0      MPV 9.6      Mitral Valve Regurgitation       Est. PA Systolic Pressure       Phosphorus 3.7      Platelets 172      Potassium 4.2      Total Protein 7.0      Protime       RBC 4.28      RDW 13.9      Sodium 138      Troponin I 0.421  Comment:  The reference interval for Troponin I represents the 99th percentile   cutoff   for our facility and is consistent with 3rd generation assay   performance.  (H) 0.607  Comment:  The reference interval for Troponin I represents the 99th percentile   cutoff   for our facility and is consistent with 3rd generation assay   performance.  (H)     Tricuspid Valve Regurgitation       WBC 7.61            Significant Imaging: CT: I have reviewed all pertinent results/findings within the past 24 hours and my personal findings are:  as see below  I have reviewed and interpreted all pertinent imaging results/findings within the past 24 hours.     CTA PE protocol:  1. Pulmonary arteries demonstrate bilateral intraluminal filling defects including the superior segmental/subsegmental arteries of the RIGHT lower lobe and subsegmental lingula, likely representing pulmonary thromboembolism in patient with history of DVT.      2.  Heterogenous primarily hypodense area involving the majority of the LEFT hepatic lobe without visualization of the LEFT portal vein.   Unchanged from prior CT abdomen and pelvis on 6/21/2017.    Assessment/Plan:      * Demand ischemia secondary to PE    Elevated troponin  - initial troponin 0.735 - EKG with no obvious ST elevation or depression  - cardiology consulted - appreciate rec's  - trend troponin Q6 - serial EKG Q6  - no previous ECHO on record - ECHO pending  - currently on apixaban for RLE superficial thrombus - continue as directed   - received 324 ASA in ED        Elevated tumor markers    CA-19-9 > 52k  AFP normal, awaiting on labs ordered per heme/onc          Acute pulmonary embolism bilateral    - CA 19-9 elevated > 52 K, concern that this was not necessarily d/t DVT showering vs in situ thrombosis from hypercoagulable state such that is found in malignancy  - heme/ onc consulted appreciate their expertise, recommending to switch to lovenox as concern that the eliquis is either fail or ineffective vs poor compliance.  - PAP very low, echo with no RV compromise, so hemodynamically insignificant PE's no need for cath lab and thrombolytics at this time          Superficial thrombosis of right lower extremity    - U/S 6/29/17 subacute superficial thrombus of the GSV from the SFJ to below the knee  - change apixaban to lovenox per Dr. Hobbs heme/onc as directed          Elevated troponin    - due to PE          Dyspepsia    - continue pantoprazole as directed          Elevated liver enzymes    Elevated alkaline phosphatase level  -  /  (baseline AST 28-36 / ALT 22-56)  - Alk Phos 564 (baseline 124-345)  - Hepatology consulted and just saw in clinic no need to see in house, MRI was scheduled for this afternoon, however she's inpatient with a cardiac/ pulm issue and hepatology stated this was urgent not emergent, so it was rescheduled for the 24th.  - CT abdomen performed 6/21/17 with interval development of heterogeneous primarily hypodense area involving the majority of the left hepatic lobe, hepatomegaly, and mild  splenomegaly.   - followed by Dr. Sanches outpatient hepatology         Elevated alkaline phosphatase level    - scheduled for outpatient for today  - Consulted Hepatology and Dr. Sanches feels that this is urgent not emergent and can be performed in the next week or two, so it was rescheduled, in light of new findings may need to request an inpatient work up and bx, as she's hypercoag with elevated tumor markers          VTE Risk Mitigation         Ordered     Place BA hose  Until discontinued      07/12/17 2136     Place sequential compression device  Until discontinued      07/12/17 2136     Reason for No Pharmacological VTE Prophylaxis  Once      07/12/17 2136     Medium Risk of VTE  Once      07/12/17 2136          Yodit Rodriguez NP  Department of Hospital Medicine   [facility]  Spectra:  [phone]  Pager: [phone]

## 2017-07-14 NOTE — CONSULTS
IR consulted for biopsy of left hepatic mass in a 67 y/o female presenting with fatigue, abdominal bloating, poor appetite, persistent cough, and recent 20lbs weight loss.    Pt currently on apixaban for superficial RLE thrombosis.    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    Percutaneous liver biopsy contraindicated by anticoagulation.  If percutaneous liver biopsy is desired, recommend holding anticoagulation, ( which can likely be done as no known DVT or PE at this time).      Case discussed with Dr. Bledsoe.    Yemi Orozco  Radiology PGY-4

## 2017-07-14 NOTE — ASSESSMENT & PLAN NOTE
- continue pantoprazole as directed    - added lactobacillus/ digestive enzymes/ simethicone   - ? Pancreatic vs choleangiocarcinoma

## 2017-07-14 NOTE — ASSESSMENT & PLAN NOTE
Elevated alkaline phosphatase level  -  /  (baseline AST 28-36 / ALT 22-56)  - Alk Phos 564 (baseline 124-345)  - Hepatology consulted and just saw in clinic no need to see in house, MRI was scheduled for this afternoon, however she's inpatient with a cardiac/ pulm issue and hepatology stated this was urgent not emergent, so it was rescheduled for the 24th.  - CT abdomen performed 6/21/17 with interval development of heterogeneous primarily hypodense area involving the majority of the left hepatic lobe, hepatomegaly, and mild splenomegaly.   - followed by Dr. Sanches outpatient hepatology

## 2017-07-14 NOTE — ASSESSMENT & PLAN NOTE
- scheduled for outpatient for today  - Consulted Hepatology and Dr. Sanches feels that this is urgent not emergent and can be performed in the next week or two, so it was rescheduled, in light of new findings may need to request an inpatient work up and bx, as she's hypercoag with elevated tumor markers

## 2017-07-14 NOTE — PROGRESS NOTES
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  Suggest consult to [facility] for EUS/ ERCPof pancreas and biliary region.    For now, use either iv heparin or Lovenox, as she will need short term holding/reversal for procedure and possible biopsy.    Nabeel Hobbs MD

## 2017-07-14 NOTE — HPI
Andra Matamoros is a 66 y.o. female with 67 y/o female with transaminitis (ALT/AST 100s) with ALP 500s being followed by Dr. Sanches, negative liver biopsy, RLE DVT on Eliquis. She presented to the ED with c/o fatigue, abdominal bloating, poor appetite, persistent cough, and recent 20lbs weight loss on 7/12/2017. She recently underwent CT of the abdomen which was suggestive of a large hypodense area involving the majority of the left hepatic lobe concerning for infarction and was scheduled for outpatient MRI of abdomen with contrast 7/13/17 for further evaluation.  During her initial work up in the ED reveals alkaline phosphatase 564, , , and a troponin of 0.735.  CTA was done showed bilateral PE. Tumor panel sent and Ca 19-9 severely elevated >52k. AFP 3.6.  normal. Hematology, Cardiology was consulted. Hepatology also consulted for further evaluation.

## 2017-07-14 NOTE — SUBJECTIVE & OBJECTIVE
Review of Systems   Constitutional: Positive for activity change, appetite change and unexpected weight change.   HENT: Negative for trouble swallowing.    Respiratory: Positive for shortness of breath.    Gastrointestinal: Positive for abdominal distention and abdominal pain. Negative for anal bleeding, blood in stool, constipation, diarrhea, nausea, rectal pain and vomiting.   Musculoskeletal: Negative for arthralgias and back pain.   Skin: Negative for color change.   Neurological: Negative for seizures and numbness.   Psychiatric/Behavioral: Negative for sleep disturbance and suicidal ideas.       Past Medical History:   Diagnosis Date    Blood clot in vein     right lower extremity       Past Surgical History:   Procedure Laterality Date     SECTION      CHOLECYSTECTOMY      TONSILLECTOMY         Family history of liver disease: Yes    Review of patient's allergies indicates:  No Known Allergies    Social History Main Topics    Smoking status: Never Smoker    Smokeless tobacco: Never Used    Alcohol use Yes      Comment: Grand Nguyen garcia    Drug use: No    Sexual activity: Not Currently     Partners: Male     Birth control/ protection: Post-menopausal       Prescriptions Prior to Admission   Medication Sig Dispense Refill Last Dose    apixaban 5 mg Tab Take 1 tablet (5 mg total) by mouth 2 (two) times daily. 60 tablet 2 2017       Objective:     Vital Signs (Most Recent):  Temp: 97.7 °F (36.5 °C) (17 0800)  Pulse: (!) 124 (17 1215)  Resp: 17 (17 1215)  BP: 108/82 (17 1215)  SpO2: 99 % (17 1215) Vital Signs (24h Range):  Temp:  [97.7 °F (36.5 °C)-98.1 °F (36.7 °C)] 97.7 °F (36.5 °C)  Pulse:  [] 124  Resp:  [17-23] 17  SpO2:  [96 %-99 %] 99 %  BP: ()/(64-82) 108/82     Weight: 58.8 kg (129 lb 10.1 oz) (17 2215)  Body mass index is 22.25 kg/m².    Physical Exam   Constitutional: She is oriented to person, place, and time. She appears  well-developed and well-nourished.   HENT:   Head: Normocephalic and atraumatic.   Eyes: Conjunctivae are normal. No scleral icterus.   Neck: Neck supple.   Cardiovascular: Normal rate and regular rhythm.    No murmur heard.  Pulmonary/Chest: Effort normal. No stridor. No respiratory distress. She has no wheezes. She has no rales.   Abdominal: Soft. Bowel sounds are normal. She exhibits distension. She exhibits no mass. There is no tenderness. There is no rebound and no guarding. No hernia.   Musculoskeletal: She exhibits no edema or tenderness.   Neurological: She is alert and oriented to person, place, and time.   Skin: Skin is warm and dry.   Psychiatric: She has a normal mood and affect.   anxious   Vitals reviewed.      MELD-Na score: 6 at 7/14/2017  6:36 AM  MELD score: 6 at 7/14/2017  6:36 AM  Calculated from:  Serum Creatinine: 0.8 mg/dL (Rounded to 1) at 7/14/2017  6:36 AM  Serum Sodium: 136 mmol/L at 7/14/2017  6:36 AM  Total Bilirubin: 0.6 mg/dL (Rounded to 1) at 7/14/2017  6:36 AM  INR(ratio): 1.0 at 7/13/2017  5:17 PM  Age: 66 years    Lab Results   Component Value Date    WBC 8.51 07/14/2017    HGB 11.7 (L) 07/14/2017    HCT 36.1 (L) 07/14/2017    MCV 85 07/14/2017     07/14/2017     Lab Results   Component Value Date     07/14/2017    K 4.7 07/14/2017     07/14/2017    CO2 27 07/14/2017    BUN 15 07/14/2017    CREATININE 0.8 07/14/2017     Lab Results   Component Value Date    ALBUMIN 3.1 (L) 07/14/2017     (H) 07/14/2017     (H) 07/14/2017     (H) 02/14/2017    ALKPHOS 670 (H) 07/14/2017    BILITOT 0.6 07/14/2017     Lab Results   Component Value Date    AFP 3.6 07/13/2017    LIPASE 27 07/12/2017    AMYLASE 24 07/16/2010       Imaging:  Reviewed and as noted in HPI.

## 2017-07-14 NOTE — PROGRESS NOTES
[facility]  Hematology/Oncology  Progress Note    Patient Name: Andra Matamoros  Admission Date: 7/12/2017  Hospital Length of Stay: 1 days  Code Status: Full Code     Subjective:     Interval History: Patient now placed on lovenox for anticoagulation. 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 She had an MRI done today which shows a diffusely infiltrative mass in left hepatic lobe (official report pending). Prior liver biopsy was of the right side.      Oncology Treatment Plan:   [No treatment plan]    Medications:  Continuous Infusions:   Scheduled Meds:   enoxaparin  1 mg/kg Subcutaneous Q12H    Lactobacillus rhamnosus GG  1 capsule Oral Daily    lipase-protease-amylase 24,000-76,000-120,000 units  2 capsule Oral TID AC    pantoprazole  40 mg Oral Daily     PRN Meds:albuterol-ipratropium 2.5mg-0.5mg/3mL, alprazolam, dextrose 50%, dextrose 50%, glucagon (human recombinant), glucose, glucose, ondansetron, ondansetron, ramelteon, senna-docusate 8.6-50 mg, simethicone     Review of Systems   Constitutional: Positive for fatigue.   HENT: Negative for congestion and mouth sores.    Eyes: Negative for photophobia and visual disturbance.   Respiratory: Positive for cough. Negative for shortness of breath.    Cardiovascular: Negative for chest pain and leg swelling.   Gastrointestinal: Positive for abdominal distention and abdominal pain.   Genitourinary: Positive for dyspareunia. Negative for dysuria.   Musculoskeletal: Negative for arthralgias.   Skin: Negative for color change and pallor.   Neurological: Negative for light-headedness and headaches.   Hematological: Negative for adenopathy. Does not bruise/bleed easily.   Psychiatric/Behavioral: Negative for agitation and behavioral problems.     Objective:     Vital Signs (Most Recent):  Temp: 97.7 °F (36.5 °C) (07/14/17 0800)  Pulse: (!) 115 (07/14/17 1500)  Resp: 17 (07/14/17 1215)  BP: 108/82  (07/14/17 1215)  SpO2: 99 % (07/14/17 1215) Vital Signs (24h Range):  Temp:  [97.7 °F (36.5 °C)-98.1 °F (36.7 °C)] 97.7 °F (36.5 °C)  Pulse:  [] 115  Resp:  [17-23] 17  SpO2:  [96 %-99 %] 99 %  BP: ()/(64-82) 108/82     Weight: 58.8 kg (129 lb 10.1 oz)  Body mass index is 22.25 kg/m².  Body surface area is 1.63 meters squared.      Intake/Output Summary (Last 24 hours) at 07/14/17 1549  Last data filed at 07/14/17 1300   Gross per 24 hour   Intake              420 ml   Output             1380 ml   Net             -960 ml       Physical Exam   Constitutional: She is oriented to person, place, and time. She appears well-developed and well-nourished.   HENT:   Mouth/Throat: Oropharynx is clear and moist.   Eyes: Conjunctivae are normal. Pupils are equal, round, and reactive to light.   Cardiovascular: Normal rate and regular rhythm.    Pulmonary/Chest: Effort normal and breath sounds normal.   Abdominal: Soft. Bowel sounds are normal.   Musculoskeletal: She exhibits no edema.   Lymphadenopathy:     She has no cervical adenopathy.   Neurological: She is alert and oriented to person, place, and time.   Skin: Skin is warm and dry.   Psychiatric: She has a normal mood and affect. Her behavior is normal.       Significant Labs:   CBC:   Recent Labs  Lab 07/12/17  1604 07/13/17  0606 07/14/17  0636   WBC 10.60 7.61 8.51   HGB 12.0 11.7* 11.7*   HCT 36.3* 36.2* 36.1*    172 222   , CMP:   Recent Labs  Lab 07/12/17  1604 07/13/17  0606 07/13/17  1305 07/14/17  0636    138  --  136   K 4.5 4.2  --  4.7    105  --  101   CO2 21* 27  --  27    103  --  101   BUN 22 19  --  15   CREATININE 0.8 0.7 0.7 0.8   CALCIUM 9.2 9.2  --  9.4   PROT 7.7 7.0  --  7.3   ALBUMIN 3.3* 3.0*  --  3.1*   BILITOT 0.5 0.5  --  0.6   ALKPHOS 564* 548*  --  670*   * 118*  --  128*   * 137*  --  146*   ANIONGAP 12 6*  --  8   EGFRNONAA >60.0 >60.0 >60.0 >60.0    and Tumor Markers:   Recent Labs  Lab  07/13/17  1716    10414.0*       Diagnostic Results:  MRI: reviewed     Assessment/Plan:     Active Diagnoses:    Diagnosis Date Noted POA    PRINCIPAL PROBLEM:  Demand ischemia secondary to PE [I24.8] 07/12/2017 Yes    Elevated troponin [R74.8] 07/13/2017 Unknown    Superficial thrombosis of right lower extremity [I82.811] 07/13/2017 Unknown    Acute pulmonary embolism bilateral [I26.99] 07/13/2017 Yes    Elevated tumor markers [R97.8] 07/13/2017 Yes    Dyspepsia [R10.13] 05/11/2017 Yes    Elevated liver enzymes [R74.8] 03/31/2017 Yes    Elevated alkaline phosphatase level [R74.8] 01/18/2017 Yes      Problems Resolved During this Admission:    Diagnosis Date Noted Date Resolved POA     A/P  66 year old with PE and elevated LFT's. Ca 19-9 was >50,000. MRI performed today shows an infiltrative lesion in left hepatic lobe. Per review with radiology, expansive lesion may have been present since January 2017 but very subtle appearance on US. Biopsy on 3/2017 was of right lobe so missed the involved area. Patient will need to cont lovenox for now. IR consulted for biopsy as discussed with [facility] and same coagulation issues prevail with ERCP but with this procedure patient will require sedation where as percutaneous biopsy would not. [facility] would be willing to perform the procedure should IR refuse. Last dose of apixiban was 7/13 and takes 48 hours be out of system. I do see that asa was given 7/12 -(will have to defer to interventionalist). Lovenox can be held the night prior to procedure. Can follow up in clinic with biopsy results.     Leslie Iglesias MD  Hematology/Oncology  [facility]    STAFF NOTE:  I have personally taken the history and examined this patient and agree with Dr. Zarate's Note as stated above.

## 2017-07-14 NOTE — ASSESSMENT & PLAN NOTE
- scheduled for outpatient for today  - Consulted Hepatology   - MRI Abd:   Large infiltrative process involving the left liver concerning for malignancy including cholangiocarcinoma.  Biopsy recommended.  - ABD us doppler pending

## 2017-07-14 NOTE — SUBJECTIVE & OBJECTIVE
Interval History: Anxious to get testing underway to see if she has cancer or not. Asking for the MRI of abd.     Review of Systems   Constitutional: Positive for activity change, appetite change, fatigue and unexpected weight change. Negative for chills and fever.   HENT: Positive for trouble swallowing. Negative for congestion and sinus pressure.    Respiratory: Positive for cough. Negative for shortness of breath and wheezing.    Cardiovascular: Positive for leg swelling. Negative for chest pain and palpitations.   Gastrointestinal: Positive for abdominal distention, constipation and nausea. Negative for abdominal pain, diarrhea and vomiting.   Endocrine: Negative.    Genitourinary: Negative for difficulty urinating.   Skin: Negative.    Allergic/Immunologic: Negative.    Neurological: Negative for dizziness, weakness, numbness and headaches.   Hematological: Negative.    Psychiatric/Behavioral: Negative.      Objective:     Vital Signs (Most Recent):  Temp: 97.7 °F (36.5 °C) (07/14/17 0800)  Pulse: 103 (07/14/17 1600)  Resp: (!) 26 (07/14/17 1600)  BP: 107/81 (07/14/17 1600)  SpO2: 98 % (07/14/17 1600) Vital Signs (24h Range):  Temp:  [97.7 °F (36.5 °C)-97.9 °F (36.6 °C)] 97.7 °F (36.5 °C)  Pulse:  [] 103  Resp:  [17-26] 26  SpO2:  [97 %-99 %] 98 %  BP: ()/(65-82) 107/81     Weight: 58.8 kg (129 lb 10.1 oz)  Body mass index is 22.25 kg/m².    Intake/Output Summary (Last 24 hours) at 07/14/17 8018  Last data filed at 07/14/17 1300   Gross per 24 hour   Intake              420 ml   Output             1130 ml   Net             -710 ml      Physical Exam   Constitutional: She is oriented to person, place, and time. She appears well-developed and well-nourished. No distress.   HENT:   Head: Normocephalic and atraumatic.   Nose: Nose normal.   Eyes: EOM are normal.   Neck: Normal range of motion. Neck supple. No JVD present.   Cardiovascular: Normal rate, regular rhythm, normal heart sounds and intact  distal pulses.  Exam reveals no gallop and no friction rub.    No murmur heard.  Pulmonary/Chest: Effort normal and breath sounds normal. No respiratory distress. She has no wheezes. She has no rales.   Abdominal: Soft. Bowel sounds are normal. She exhibits distension. She exhibits no mass. There is tenderness (RUQ). There is no guarding.   Musculoskeletal: Normal range of motion. She exhibits no edema.   Lymphadenopathy:     She has no cervical adenopathy.   Neurological: She is alert and oriented to person, place, and time. No cranial nerve deficit. She exhibits abnormal muscle tone.   Skin: Skin is warm and dry. Capillary refill takes less than 2 seconds. No rash noted. No erythema. No pallor.   Psychiatric: She has a normal mood and affect. Her behavior is normal. Judgment and thought content normal.       Significant Labs:   BMP:   Recent Labs  Lab 07/13/17  0606 07/14/17  0636     --  101     --  136   K 4.2  --  4.7     --  101   CO2 27  --  27   BUN 19  --  15   CREATININE 0.7  < > 0.8   CALCIUM 9.2  --  9.4   MG 2.0  --   --    < > = values in this interval not displayed.  CBC:   Recent Labs  Lab 07/13/17  0606 07/14/17  0636   WBC 7.61 8.51   HGB 11.7* 11.7*   HCT 36.2* 36.1*    222     CMP:   Recent Labs  Lab 07/13/17  0606 07/13/17  1305 07/14/17  0636     --  136   K 4.2  --  4.7     --  101   CO2 27  --  27     --  101   BUN 19  --  15   CREATININE 0.7 0.7 0.8   CALCIUM 9.2  --  9.4   PROT 7.0  --  7.3   ALBUMIN 3.0*  --  3.1*   BILITOT 0.5  --  0.6   ALKPHOS 548*  --  670*   *  --  128*   *  --  146*   ANIONGAP 6*  --  8   EGFRNONAA >60.0 >60.0 >60.0     Magnesium:   Recent Labs  Lab 07/13/17  0606   MG 2.0       Significant Imaging: I have reviewed and interpreted all pertinent imaging results/findings within the past 24 hours.     MRI Abd:   Large infiltrative process involving the left liver concerning for malignancy including  cholangiocarcinoma.  Biopsy recommended.    Abd US with doppler: ordered pending    CTA: 1. Pulmonary arteries demonstrate bilateral intraluminal filling defects including the superior segmental/subsegmental arteries of the RIGHT lower lobe and subsegmental lingula, likely representing pulmonary thromboembolism in patient with history of DVT.      2.  Heterogenous primarily hypodense area involving the majority of the LEFT hepatic lobe without visualization of the LEFT portal vein.  Unchanged from prior CT abdomen and pelvis on 6/21/2017.

## 2017-07-14 NOTE — PLAN OF CARE
Problem: Patient Care Overview  Goal: Plan of Care Review  Outcome: Ongoing (interventions implemented as appropriate)  POC reviewed with patient and she verbalized understanding. VSS and patient denies presence of pain or SOB. Patient had MRI preformed this morning. Patient continues on Eliquist and Lovenox. Patient in no apparent sign of distress, will continue to monitor.

## 2017-07-14 NOTE — ASSESSMENT & PLAN NOTE
Hypercoagulable state from likely undiagnosed cancer.   CA 19.9 is >50K, likely pancreatobiliary origin. Normal AFP.  Liver biopsy is unremarkable.     Plan:  Recommend MRI when patient is clinically stable.  Recommend repeat US with dopplers.

## 2017-07-15 VITALS
HEART RATE: 85 BPM | OXYGEN SATURATION: 96 % | DIASTOLIC BLOOD PRESSURE: 93 MMHG | BODY MASS INDEX: 22.13 KG/M2 | TEMPERATURE: 97 F | SYSTOLIC BLOOD PRESSURE: 123 MMHG | WEIGHT: 129.63 LBS | RESPIRATION RATE: 22 BRPM | HEIGHT: 64 IN

## 2017-07-15 LAB
ALBUMIN SERPL BCP-MCNC: 3 G/DL
ALP SERPL-CCNC: 730 U/L
ALT SERPL W/O P-5'-P-CCNC: 186 U/L
ANION GAP SERPL CALC-SCNC: 8 MMOL/L
APC INTERPRETATION: NORMAL
APCR PPP: 3.4 {RATIO}
AST SERPL-CCNC: 166 U/L
B2 GLYCOPROT1 IGA SER QL: 17 SAU
B2 GLYCOPROT1 IGG SER QL: <9 SGU
B2 GLYCOPROT1 IGM SER QL: <9 SMU
BASOPHILS # BLD AUTO: 0.02 K/UL
BASOPHILS NFR BLD: 0.3 %
BILIRUB SERPL-MCNC: 0.5 MG/DL
BUN SERPL-MCNC: 18 MG/DL
CALCIUM SERPL-MCNC: 9.3 MG/DL
CHLORIDE SERPL-SCNC: 102 MMOL/L
CO2 SERPL-SCNC: 28 MMOL/L
CREAT SERPL-MCNC: 0.8 MG/DL
DIFFERENTIAL METHOD: ABNORMAL
EOSINOPHIL # BLD AUTO: 0.1 K/UL
EOSINOPHIL NFR BLD: 1.6 %
ERYTHROCYTE [DISTWIDTH] IN BLOOD BY AUTOMATED COUNT: 14 %
EST. GFR  (AFRICAN AMERICAN): >60 ML/MIN/1.73 M^2
EST. GFR  (NON AFRICAN AMERICAN): >60 ML/MIN/1.73 M^2
FLOW CYTOMETRY SPECIALIST REVIEW: NORMAL
GLUCOSE SERPL-MCNC: 100 MG/DL
HCT VFR BLD AUTO: 35.2 %
HGB BLD-MCNC: 11.4 G/DL
IGG4 SER-MCNC: 69 MG/DL
LYMPHOCYTES # BLD AUTO: 1.2 K/UL
LYMPHOCYTES NFR BLD: 15.5 %
MCH RBC QN AUTO: 27.6 PG
MCHC RBC AUTO-ENTMCNC: 32.4 %
MCV RBC AUTO: 85 FL
MONOCYTES # BLD AUTO: 0.7 K/UL
MONOCYTES NFR BLD: 8.6 %
NEUTROPHILS # BLD AUTO: 5.6 K/UL
NEUTROPHILS NFR BLD: 73.5 %
PLATELET # BLD AUTO: 219 K/UL
PMV BLD AUTO: 9.4 FL
PNH GRANULOCYTES: 0 % (ref 0–0.01)
PNH MONOCYTES: 0 % (ref 0–0.05)
PNH RBC-COMPLETE AG LOSS: 0 % (ref 0–0.01)
PNH RBC-PARTIAL AG LOSS: 0 % (ref 0–0.99)
POTASSIUM SERPL-SCNC: 4.3 MMOL/L
PROT SERPL-MCNC: 6.8 G/DL
RBC # BLD AUTO: 4.13 M/UL
SODIUM SERPL-SCNC: 138 MMOL/L
WBC # BLD AUTO: 7.67 K/UL

## 2017-07-15 PROCEDURE — 85025 COMPLETE CBC W/AUTO DIFF WBC: CPT

## 2017-07-15 PROCEDURE — 25000003 PHARM REV CODE 250: Performed by: NURSE PRACTITIONER

## 2017-07-15 PROCEDURE — 80053 COMPREHEN METABOLIC PANEL: CPT

## 2017-07-15 PROCEDURE — 36415 COLL VENOUS BLD VENIPUNCTURE: CPT

## 2017-07-15 PROCEDURE — 63600175 PHARM REV CODE 636 W HCPCS: Performed by: NURSE PRACTITIONER

## 2017-07-15 PROCEDURE — 99239 HOSP IP/OBS DSCHRG MGMT >30: CPT | Mod: ,,, | Performed by: NURSE PRACTITIONER

## 2017-07-15 RX ORDER — ALPRAZOLAM 0.25 MG/1
0.25 TABLET ORAL 2 TIMES DAILY PRN
Qty: 20 TABLET | Refills: 0 | Status: ON HOLD | OUTPATIENT
Start: 2017-07-15 | End: 2017-07-18

## 2017-07-15 RX ADMIN — ENOXAPARIN SODIUM 60 MG: 100 INJECTION SUBCUTANEOUS at 09:07

## 2017-07-15 RX ADMIN — ACETAMINOPHEN 650 MG: 325 TABLET ORAL at 09:07

## 2017-07-15 RX ADMIN — PANCRELIPASE 2 CAPSULE: 24000; 76000; 120000 CAPSULE, DELAYED RELEASE PELLETS ORAL at 11:07

## 2017-07-15 RX ADMIN — PANTOPRAZOLE SODIUM 40 MG: 40 TABLET, DELAYED RELEASE ORAL at 08:07

## 2017-07-15 RX ADMIN — Medication 1 CAPSULE: at 08:07

## 2017-07-15 RX ADMIN — PANCRELIPASE 2 CAPSULE: 24000; 76000; 120000 CAPSULE, DELAYED RELEASE PELLETS ORAL at 05:07

## 2017-07-15 NOTE — ASSESSMENT & PLAN NOTE
Hypercoagulable state from likely undiagnosed cancer.   CA 19.9 is >50K, likely pancreatobiliary origin. Normal AFP.  Liver biopsy is unremarkable in March 2017 but not sure this was targeted biopsies.     Plan:  IR multidisciplinary meeting on Monday for biopsy.

## 2017-07-15 NOTE — PLAN OF CARE
Problem: Patient Care Overview  Goal: Plan of Care Review  Outcome: Ongoing (interventions implemented as appropriate)  Plan of care reviewed with pt. VS stable. No acute events at this time. No complaints. Abd pain relieved by PRN Tylenol. PT had abd US completed last night. Pt remains free from falls or injury. Bed low and locked, call light and personal belongings within reach. Will continue to monitor. Barbara Dias RN

## 2017-07-15 NOTE — SUBJECTIVE & OBJECTIVE
Interval History:   MRI and US liver done. Normal vasculature. Large infiltrative mass 9x15 cm.     Current Facility-Administered Medications   Medication    acetaminophen tablet 650 mg    albuterol-ipratropium 2.5mg-0.5mg/3mL nebulizer solution 3 mL    alprazolam tablet 0.25 mg    dextrose 50% injection 12.5 g    dextrose 50% injection 25 g    enoxaparin injection 60 mg    glucagon (human recombinant) injection 1 mg    glucose chewable tablet 16 g    glucose chewable tablet 24 g    Lactobacillus rhamnosus GG capsule 1 capsule    lipase-protease-amylase 24,000-76,000-120,000 units capsule 2 capsule    ondansetron disintegrating tablet 8 mg    ondansetron injection 4 mg    pantoprazole EC tablet 40 mg    ramelteon tablet 8 mg    senna-docusate 8.6-50 mg per tablet 1 tablet    simethicone chewable tablet 80 mg       Objective:     Vital Signs (Most Recent):  Temp: 97.1 °F (36.2 °C) (07/15/17 0800)  Pulse: 93 (07/15/17 1100)  Resp: (!) 25 (07/15/17 1000)  BP: 113/85 (07/15/17 1000)  SpO2: 98 % (07/15/17 1000) Vital Signs (24h Range):  Temp:  [97.1 °F (36.2 °C)-97.9 °F (36.6 °C)] 97.1 °F (36.2 °C)  Pulse:  [] 93  Resp:  [14-26] 25  SpO2:  [97 %-99 %] 98 %  BP: ()/(73-87) 113/85     Weight: 58.8 kg (129 lb 10.1 oz) (07/12/17 2215)  Body mass index is 22.25 kg/m².    Physical Exam   Constitutional: She is oriented to person, place, and time. She appears well-developed and well-nourished.   HENT:   Head: Normocephalic and atraumatic.   Eyes: Conjunctivae are normal. No scleral icterus.   Neck: Neck supple.   Cardiovascular: Normal rate and regular rhythm.    No murmur heard.  Pulmonary/Chest: Effort normal. No stridor. No respiratory distress. She has no wheezes. She has no rales.   Abdominal: Soft. Bowel sounds are normal. She exhibits no distension and no mass. There is no tenderness. There is no rebound and no guarding. No hernia.   Musculoskeletal: She exhibits no edema or tenderness.    Neurological: She is alert and oriented to person, place, and time.   Skin: Skin is warm and dry.   Psychiatric: She has a normal mood and affect.   Vitals reviewed.      MELD-Na score: 6 at 7/15/2017  6:17 AM  MELD score: 6 at 7/15/2017  6:17 AM  Calculated from:  Serum Creatinine: 0.8 mg/dL (Rounded to 1) at 7/15/2017  6:17 AM  Serum Sodium: 138 mmol/L (Rounded to 137) at 7/15/2017  6:17 AM  Total Bilirubin: 0.5 mg/dL (Rounded to 1) at 7/15/2017  6:17 AM  INR(ratio): 1.0 at 7/13/2017  5:17 PM  Age: 66 years    Lab Results   Component Value Date    WBC 7.67 07/15/2017    HGB 11.4 (L) 07/15/2017    HCT 35.2 (L) 07/15/2017    MCV 85 07/15/2017     07/15/2017     Lab Results   Component Value Date     07/15/2017    K 4.3 07/15/2017     07/15/2017    CO2 28 07/15/2017    BUN 18 07/15/2017    CREATININE 0.8 07/15/2017     Lab Results   Component Value Date    ALBUMIN 3.0 (L) 07/15/2017     (H) 07/15/2017     (H) 07/15/2017     (H) 02/14/2017    ALKPHOS 730 (H) 07/15/2017    BILITOT 0.5 07/15/2017     Lab Results   Component Value Date    AFP 3.6 07/13/2017    LIPASE 27 07/12/2017    AMYLASE 24 07/16/2010       Imaging:  Reviewed and as noted in HPI.

## 2017-07-16 ENCOUNTER — DOCUMENTATION ONLY (OUTPATIENT)
Dept: TRANSPLANT | Facility: CLINIC | Age: 67
End: 2017-07-16

## 2017-07-16 NOTE — ASSESSMENT & PLAN NOTE
Elevated troponin  - initial troponin 0.735 - EKG with no obvious ST elevation or depression  - cardiology consulted - appreciate rec's  - trop flat, demand ischemia from PE's  - no previous ECHO on record - ECHO see below  - on apixaban for RLE superficial thrombus - now with PE's ? [place] clot and ? hepatic thrombosis, switched to lovenox for treatment dosing and higher one time per day dosing for discharge to make sure she had proper dosing as she frequently missed eliquis doses.  Echo:  1 - Normal left ventricular systolic function (EF 60-65%).     2 - Normal left ventricular diastolic function.     3 - Normal right ventricular systolic function .     4 - The estimated PA systolic pressure is greater than 17 mmHg.

## 2017-07-16 NOTE — ASSESSMENT & PLAN NOTE
- continue pantoprazole as directed    - added lactobacillus/ digestive enzymes/ simethicone   - ? choleangiocarcinoma

## 2017-07-16 NOTE — ASSESSMENT & PLAN NOTE
- Consulted Hepatology   - MRI Abd:   Large infiltrative process involving the left liver concerning for malignancy including cholangiocarcinoma.  Biopsy recommended.  - ABD us doppler no portal vein thrombosis, L lobe mass

## 2017-07-16 NOTE — DISCHARGE SUMMARY
[facility] Hospital Medicine  Discharge Summary      Patient Name: Andra Matamoros  MRN: [id number]  Admission Date: 7/12/2017  Hospital Length of Stay: 2 days  Discharge Date and Time: 7/15/2017  4:12 PM  Attending Physician: Dr. Floresita Baird   Discharging Provider: Yodit Rodriguez NP  Primary Care Provider: Wesly Parr MD  [facility] Medicine Team: [facility] MED  Yodit Rodriguez NP    HPI:   67 y/o female, who presents to the ED with c/o fatigue, abdominal bloating, poor appetite, persistent cough, and recent 20lbs weight loss.  She has a PMH of elevated liver enzymes and alkaline phosphatase for which she is followed by outpatient hepatology Dr. Sanches.  She recently underwent CT of the abdomen which was suggestive of a large hypodense area involving the majority of the left hepatic lobe.  She is currently scheduled for outpatient MRI of abdomen with contrast 7/13/17 for further evaluation.  She recently underwent a liver biopsy roughly one month ago and reportedly was unremarkable.  This is her second liver biopsy.  Additionally she was recently evaluated for RLE swelling and pain and was found to have a large superficial thrombus and was started on apixaban. During her initial work up in the ED reveals alkaline phosphatase 564, , , and a troponin of 0.735.  She denies chest pain or SOB.  She states that she has had LUE pain primarily at the elbow and occasional chest tightness.  She states that her fatigue has worsened to the point that she cannot perform her normal activities and often requires rest breaks and naps throughout the day.    Family at bedside state that these breaks and naps are very unusual for her and that she is normally hyper.  She attributes her weight loss to abdominal bloating and feeling of early satiety.  She states that feeling of bloating worsen as the day goes on and by dinner she has little to no appetite. She recently underwent EGD  which was reportedly negative for acute findings. She denies recent illness, fever, chills, N/V/D, or  symptoms.     * No surgery found *      Indwelling Lines/Drains at time of discharge:   Lines/Drains/Airways          No matching active lines, drains, or airways        Hospital Course:   7/12 admitted to hospital medicine for progressive and excessive fatigue, intermittent L arm pain and back pain.  She noted her symptoms have progressed rapidly since her liver biopsy in June 2017, which was performed due to elevated LFT's, recent 20 lb weight loss and it was inconclusive.  She was due for an MRI today of her liver, however since she's inpatient, this was discussed with hepatology and this can be rescheduled in a week or two, while she's being worked up for her troponinemia, L arm/ back pain.  Cards consult saw in ED, felt this was demand ischemia, and that PE could be considered on the differential.  D dimer was ordered and it was 3.19, so CTA PE protocol revealed bilateral intraluminal filling defects including the superior segmental/subsegmental arteries of the RIGHT lower lobe and subsegmental lingula, likely representing pulmonary thromboembolism in patient with history of DVT.  The radiologist called and stated he thinks she also see's clot in the DAYTON however this was not commented in the text portion.  She's on current treatment for DVT of R superficial femoral vein on eliquis which is treatment for PE, will continue current regimen and per cardiology recommendation to check transesophageal echo in about a month's time to see if the DAYTON clot has dissipated.  They are also recommending a 30 day holter to r/o underlying Afib as possible cause for DAYTON clot.   With the new findings, hypercoaguable work up underway, and heme/ onc consulted.  They recommend changing to lovenox for now as they are unsure if she just failed therapy or is because she missed several doses.  Tumor panel sent and Ca 19-9 severely  elevated >52k. AFP 3.6.  Normal.  7/14 MRI of ABD: Large infiltrative process involving the left liver concerning for malignancy including cholangiocarcinoma.  Biopsy recommended. Hepatology on board, ABD US with doppler no proof of portal vein obstruction.  Needs biopsy of L hepatic lobe, advanced endoscopic service unable to get to left lobe for biopsy.  Discussed with interventional radiology and they are willing to perform next week as outpatient as she's hemodynamically stable. Since she forgets her meds on occasion the pharm D decided to treat her PE's with lovenox at 1.5mg/kg per day.  She is to take the lovenox the early am the day before her biopsy and then resume only when IR states or within 24 hrs if no complications noted.  She has f/u scheduled with heme/once and hepatology.  Cardiology wanted 30 day holter to just r/o afib as concern for DAYTON thrombus was verbally noted by radiologist.  While in house she was in SR the whole time.  A holter was ordered for discharge however they are unavailable on the weekends, someone will contact her on Monday.        Consults:   Consults         Status Ordering Provider     Inpatient consult to Cardiology  Once     Provider:  (Not yet assigned)    Completed JORDAN CHISHOLM     Inpatient consult to Cardiology  Once     Provider:  (Not yet assigned)    Completed LEÓN URBINA     Inpatient consult to Hematology/Oncology  Once     Provider:  (Not yet assigned)    Completed LEÓN URBINA     Inpatient consult to Hepatology  Once     Provider:  (Not yet assigned)    Completed MARIANN MEEKS     Inpatient consult to Interventional Radiology  Once     Provider:  (Not yet assigned)    Completed KEE ZHOU      Review of Systems   Constitutional: Positive for activity change, appetite change, fatigue and unexpected weight change. Negative for chills and fever.   HENT: Positive for trouble swallowing. Negative for congestion and  sinus pressure.    Respiratory: Positive for cough. Negative for shortness of breath and wheezing.    Cardiovascular: Positive for leg swelling. Negative for chest pain and palpitations.   Gastrointestinal: Positive for abdominal distention, constipation and nausea. Negative for abdominal pain, diarrhea and vomiting.   Endocrine: Negative.    Genitourinary: Negative for difficulty urinating.   Skin: Negative.    Allergic/Immunologic: Negative.    Neurological: Negative for dizziness, weakness, numbness and headaches.   Hematological: Negative.    Psychiatric/Behavioral: Negative.      Physical Exam   Constitutional: She is oriented to person, place, and time. She appears well-developed and well-nourished. No distress.   HENT:   Head: Normocephalic and atraumatic.   Nose: Nose normal.   Eyes: EOM are normal.   Neck: Normal range of motion. Neck supple. No JVD present.   Cardiovascular: Normal rate, regular rhythm, normal heart sounds and intact distal pulses.  Exam reveals no gallop and no friction rub.    No murmur heard.  Pulmonary/Chest: Effort normal and breath sounds normal. No respiratory distress. She has no wheezes. She has no rales.   Abdominal: Soft. Bowel sounds are normal. She exhibits distension. She exhibits no mass. There is tenderness (RUQ). There is no guarding.   Musculoskeletal: Normal range of motion. She exhibits no edema.   Lymphadenopathy:     She has no cervical adenopathy.   Neurological: She is alert and oriented to person, place, and time. No cranial nerve deficit. She exhibits abnormal muscle tone.   Skin: Skin is warm and dry. Capillary refill takes less than 2 seconds. No rash noted. No erythema. No pallor.   Psychiatric: She has a normal mood and affect. Her behavior is normal. Judgment and thought content normal.       Significant Diagnostic Studies: Labs:   BMP:   Recent Labs  Lab 07/14/17  0636 07/15/17  0617    100    138   K 4.7 4.3    102   CO2 27 28   BUN 15 18    CREATININE 0.8 0.8   CALCIUM 9.4 9.3   , CMP   Recent Labs  Lab 07/14/17  0636 07/15/17  0617    138   K 4.7 4.3    102   CO2 27 28    100   BUN 15 18   CREATININE 0.8 0.8   CALCIUM 9.4 9.3   PROT 7.3 6.8   ALBUMIN 3.1* 3.0*   BILITOT 0.6 0.5   ALKPHOS 670* 730*   * 166*   * 186*   ANIONGAP 8 8   ESTGFRAFRICA >60.0 >60.0   EGFRNONAA >60.0 >60.0   , CBC   Recent Labs  Lab 07/14/17  0636 07/15/17  0617   WBC 8.51 7.67   HGB 11.7* 11.4*   HCT 36.1* 35.2*    219   , INR   Lab Results   Component Value Date    INR 1.0 07/13/2017    INR 1.1 07/12/2017    INR 0.9 03/29/2017   , Lipid Panel   Lab Results   Component Value Date    CHOL 180 01/17/2017    HDL 69 01/17/2017    LDLCALC 95.4 01/17/2017    TRIG 78 01/17/2017    CHOLHDL 38.3 01/17/2017    and Troponin   Recent Labs  Lab 07/13/17  0943   TROPONINI 0.318*     Radiology: X-Ray: CXR: X-Ray Chest 1 View (CXR):   Results for orders placed or performed during the hospital encounter of 07/12/17   X-Ray Chest 1 View    Narrative    History: Weakness.    Procedure: Chest one view    Findings    The examination is compared to study of 2/20/2017.    Cardiomediastinal silhouette remains within normal limits.  There is no tracheal abnormalities the lungs are clear.  Pulmonary vasculature within normal limits.  There are cardiac monitoring leads of the chest.    Impression    No acute cardiopulmonary processes.      Electronically signed by: MUSA VAZQUEZ MD  Date:     07/12/17  Time:    16:10     and X-Ray Chest PA and Lateral (CXR): No results found for this visit on 07/12/17.    MRI: Large infiltrative process involving the left liver concerning for malignancy including cholangiocarcinoma.  Biopsy recommended.    Chest CT PE protocol scan: 1. Pulmonary arteries demonstrate bilateral intraluminal filling defects including the superior segmental/subsegmental arteries of the RIGHT lower lobe and subsegmental lingula, likely representing  pulmonary thromboembolism in patient with history of DVT.    2.  Heterogenous primarily hypodense area involving the majority of the LEFT hepatic lobe without visualization of the LEFT portal vein.  Unchanged from prior CT abdomen and pelvis on 6/21/2017.    Cardiac Graphics: Echocardiogram:   2D echo with color flow doppler:   Results for orders placed or performed during the hospital encounter of 07/12/17   2D echo with color flow doppler   Result Value Ref Range    EF 60 55 - 65    Mitral Valve Regurgitation TRIVIAL     Diastolic Dysfunction No     Est. PA Systolic Pressure 16.81     Tricuspid Valve Regurgitation TRIVIAL        Pending Diagnostic Studies:     Procedure Component Value Units Date/Time    Antithrombin III [[id number]] Collected:  07/13/17 1717    Order Status:  Sent Lab Status:  In process Updated:  07/13/17 1722    Specimen:  Blood from Blood     Narrative:       Collection has been rescheduled by SAN1 at 7/13/2017 16:04 Reason:   Doctors asked to return later more labs are being added.     DRVVT [[id number]] Collected:  07/13/17 1717    Order Status:  Sent Lab Status:  In process Updated:  07/13/17 1722    Specimen:  Blood from Blood     Narrative:       Collection has been rescheduled by SAN1 at 7/13/2017 16:04 Reason:   Doctors asked to return later more labs are being added.   Collection has been rescheduled by SAN1 at 7/13/2017 16:04 Reason:   Doctors asked to return later more labs are being added.   Collection has been rescheduled by SAN1 at 7/13/2017 16:04 Reason:   Doctors asked to return later more labs are being added.   Collection has been rescheduled by SAN1 at 7/13/2017 16:04 Reason:   Doctors asked to return later more labs are being added.     Factor 5 leiden [[id number]] Collected:  07/13/17 1436    Order Status:  Sent Lab Status:  In process Updated:  07/13/17 1607    Specimen:  Blood from Blood     Jak2 V617F Mutation Detection, Blood [[id number]] Collected:  07/13/17 1717    Order  Status:  Sent Lab Status:  In process Updated:  07/13/17 1722    Specimen:  Blood from Blood     Protein C activity [[id number]] Collected:  07/13/17 1717    Order Status:  Sent Lab Status:  In process Updated:  07/13/17 1722    Specimen:  Blood from Blood     Narrative:       Collection has been rescheduled by CARIN at 7/13/2017 16:04 Reason:   Doctors asked to return later more labs are being added.     Protein S activity [[id number]] Collected:  07/13/17 1436    Order Status:  Sent Lab Status:  In process Updated:  07/13/17 1436    Specimen:  Blood from Blood         Final Active Diagnoses:    Diagnosis Date Noted POA    PRINCIPAL PROBLEM:  Demand ischemia secondary to PE [I24.8] 07/12/2017 Yes    Liver mass, left lobe [R16.0]  Yes    Elevated troponin [R74.8] 07/13/2017 Yes    Superficial thrombosis of right lower extremity [I82.811] 07/13/2017 Yes    Acute pulmonary embolism bilateral [I26.99] 07/13/2017 Yes    Elevated tumor markers [R97.8] 07/13/2017 Yes    Dyspepsia [R10.13] 05/11/2017 Yes    Elevated liver enzymes [R74.8] 03/31/2017 Yes    Elevated alkaline phosphatase level [R74.8] 01/18/2017 Yes      Problems Resolved During this Admission:    Diagnosis Date Noted Date Resolved POA      * Demand ischemia secondary to PE    Elevated troponin  - initial troponin 0.735 - EKG with no obvious ST elevation or depression  - cardiology consulted - appreciate rec's  - trop flat, demand ischemia from PE's  - no previous ECHO on record - ECHO see below  - on apixaban for RLE superficial thrombus - now with PE's ? DAYTON clot and ? hepatic thrombosis, switched to lovenox for treatment dosing and higher one time per day dosing for discharge to make sure she had proper dosing as she frequently missed eliquis doses.  Echo:  1 - Normal left ventricular systolic function (EF 60-65%).     2 - Normal left ventricular diastolic function.     3 - Normal right ventricular systolic function .     4 - The estimated PA  systolic pressure is greater than 17 mmHg.         Liver mass, left lobe    - see MRI results above, Biopsy to be done as an outpatient. Heme/onc okay to perform next week with f/u.   - appears to have been present since January 2017 per notes.        Elevated tumor markers    CA-19-9 > 52k  AFP normal, awaiting on labs ordered per heme/onc          Acute pulmonary embolism bilateral    - CA 19-9 elevated > 52 K, concern that this was not necessarily d/t DVT showering vs in situ thrombosis from hypercoagulable state such that is found in malignancy  - heme/ onc consulted appreciate their expertise, recommending to switch to lovenox as concern that the eliquis is either fail or ineffective vs poor compliance.  - PAP very low, echo with no RV compromise, so hemodynamically insignificant PE's no need for cath lab and thrombolytics at this time          Superficial thrombosis of right lower extremity    - U/S 6/29/17 subacute superficial thrombus of the GSV from the SFJ to below the knee  - change apixaban to lovenox per Dr. Hobbs heme/onc as directed          Elevated troponin    - due to PE          Dyspepsia    - continue pantoprazole as directed    - added lactobacillus/ digestive enzymes/ simethicone   - ? choleangiocarcinoma        Elevated liver enzymes    Elevated alkaline phosphatase level - followed by Dr. Sanches outpatient hepatology   -  /  (baseline AST 28-36 / ALT 22-56)  - Alk Phos 564 (baseline 124-345)  - Hepatology consulted - see above  - CT abdomen performed 6/21/17 with interval development of heterogeneous primarily hypodense area involving the majority of the left hepatic lobe, hepatomegaly, and mild splenomegaly.             Elevated alkaline phosphatase level    - Consulted Hepatology   - MRI Abd:   Large infiltrative process involving the left liver concerning for malignancy including cholangiocarcinoma.  Biopsy recommended.  - ABD us doppler no portal vein thrombosis, L lobe  mass              Discharged Condition: good    Disposition: Home or Self Care    Follow Up:  Follow-up Information     Alexander Sanches MD.    Specialties:  Hepatology, Gastroenterology, Transplant  Why:  Spoke to Hepatology Clinic - [facility] for follow up.   Contact information:  [address] [facility] [id number] [phone]             Nabeel Hobbs MD.    Specialties:  Hematology and Oncology, Hematology  Why:  urgent f/u biopsy   Contact information:  [facility] [id number] [phone]             [facility] - Interventional Rad.    Specialty:  Interventional Radiology  Why:  call monday to schedule biopsy tuesday, urgent   Contact information:  [facility] [address] [phone]  Additional information:  2nd Floor               Patient Instructions:     Ambulatory Referral to Interventional Radiology   Referral Priority: Urgent Referral Type: Consultation   Referral Reason: Specialty Services Required    Requested Specialty: Interventional Radiology    Number of Visits Requested: 1      Ambulatory referral to Hematology / Oncology   Referral Priority: Urgent Referral Type: Consultation   Referral Reason: Specialty Services Required    Referred to Provider: NABEEL HOBBS JR Requested Specialty: Hematology and Oncology   Number of Visits Requested: 1      Ambulatory Referral to Hepatology   Referral Priority: Urgent Referral Type: Consultation   Referral Reason: Specialty Services Required    Referred to Provider: ALEXANDER SANCHES    Number of Visits Requested: 1      Diet general   Order Specific Question Answer Comments   Additional restrictions: High Protein/High Calorie      Activity as tolerated     Call MD for:  temperature >100.4     Call MD for:  persistent nausea and vomiting or diarrhea     Call MD for:  severe uncontrolled pain     Call MD for:  redness, tenderness, or signs of infection (pain, swelling, redness, odor or green/yellow  discharge around incision site)     Call MD for:  difficulty breathing or increased cough     Call MD for:  severe persistent headache     Call MD for:  worsening rash     Call MD for:  persistent dizziness, light-headedness, or visual disturbances     Call MD for:  increased confusion or weakness     Cardiac event monitor   Standing Status: Future  Standing Exp. Date: 07/13/18   Order Specific Question Answer Comments   Cardiac Event Monitor Looping Recorder        Medications:  Reconciled Home Medications:   Discharge Medication List as of 7/15/2017  2:25 PM      START taking these medications    Details   alprazolam (XANAX) 0.25 MG tablet Take 1 tablet (0.25 mg total) by mouth 2 (two) times daily as needed for Anxiety (mri)., Starting Sat 7/15/2017, Until Mon 8/14/2017, Print      enoxaparin (LOVENOX) 100 mg/mL Syrg Inject 0.9 mLs (90 mg total) into the skin once daily at 6am., Starting Fri 7/14/2017, Normal      Lactobacillus rhamnosus GG (CULTURELLE) 10 billion cell capsule Take 1 capsule by mouth 2 (two) times daily., Starting Sat 7/15/2017, OTC         STOP taking these medications       apixaban 5 mg Tab Comments:   Reason for Stopping:             Time spent on the discharge of patient: 65 minutes    Yodit Rodriguez NP  Department of Hospital Medicine  [facility]

## 2017-07-16 NOTE — ASSESSMENT & PLAN NOTE
- see MRI results above, Biopsy to be done as an outpatient. Heme/onc okay to perform next week with f/u.   - appears to have been present since January 2017 per notes.

## 2017-07-17 ENCOUNTER — PATIENT MESSAGE (OUTPATIENT)
Dept: INTERNAL MEDICINE | Facility: CLINIC | Age: 67
End: 2017-07-17

## 2017-07-17 ENCOUNTER — HOSPITAL ENCOUNTER (INPATIENT)
Facility: HOSPITAL | Age: 67
LOS: 3 days | Discharge: HOME | DRG: 435 | End: 2017-07-20
Attending: HOSPITALIST | Admitting: HOSPITALIST
Payer: MEDICARE

## 2017-07-17 ENCOUNTER — TELEPHONE (OUTPATIENT)
Dept: HEPATOLOGY | Facility: CLINIC | Age: 67
End: 2017-07-17

## 2017-07-17 ENCOUNTER — PATIENT MESSAGE (OUTPATIENT)
Dept: HEPATOLOGY | Facility: CLINIC | Age: 67
End: 2017-07-17

## 2017-07-17 DIAGNOSIS — R16.0 LIVER MASS: ICD-10-CM

## 2017-07-17 DIAGNOSIS — R74.8 ELEVATED LIVER ENZYMES: ICD-10-CM

## 2017-07-17 DIAGNOSIS — R16.0 LIVER MASS, LEFT LOBE: Primary | ICD-10-CM

## 2017-07-17 LAB
ABO + RH BLD: NORMAL
AFP SERPL-MCNC: 3.3 NG/ML
ALBUMIN SERPL BCP-MCNC: 3.3 G/DL
ALP SERPL-CCNC: 957 U/L
ALT SERPL W/O P-5'-P-CCNC: 502 U/L
AMMONIA PLAS-SCNC: 33 UMOL/L
ANION GAP SERPL CALC-SCNC: 10 MMOL/L
APTT BLDCRRT: 23 SEC
APTT HEX PL PPP: POSITIVE S
AST SERPL-CCNC: 364 U/L
AT III ACT/NOR PPP CHRO: >131 %
BACTERIA BLD CULT: NORMAL
BACTERIA BLD CULT: NORMAL
BASOPHILS # BLD AUTO: 0.02 K/UL
BASOPHILS NFR BLD: 0.2 %
BILIRUB DIRECT SERPL-MCNC: 0.5 MG/DL
BILIRUB SERPL-MCNC: 0.8 MG/DL
BLD GP AB SCN CELLS X3 SERPL QL: NORMAL
BUN SERPL-MCNC: 18 MG/DL
CALCIUM SERPL-MCNC: 9.4 MG/DL
CHLORIDE SERPL-SCNC: 104 MMOL/L
CO2 SERPL-SCNC: 25 MMOL/L
CREAT SERPL-MCNC: 0.7 MG/DL
DIFFERENTIAL METHOD: ABNORMAL
EOSINOPHIL # BLD AUTO: 0 K/UL
EOSINOPHIL NFR BLD: 0.5 %
ERYTHROCYTE [DISTWIDTH] IN BLOOD BY AUTOMATED COUNT: 14.1 %
EST. GFR  (AFRICAN AMERICAN): >60 ML/MIN/1.73 M^2
EST. GFR  (NON AFRICAN AMERICAN): >60 ML/MIN/1.73 M^2
F5 P.R506Q BLD/T QL: NORMAL
GLUCOSE SERPL-MCNC: 118 MG/DL
HCT VFR BLD AUTO: 34.3 %
HGB BLD-MCNC: 11.2 G/DL
INR PPP: 1
LACTATE SERPL-SCNC: 0.7 MMOL/L
LYMPHOCYTES # BLD AUTO: 0.9 K/UL
LYMPHOCYTES NFR BLD: 10.6 %
MAGNESIUM SERPL-MCNC: 1.9 MG/DL
MCH RBC QN AUTO: 27.9 PG
MCHC RBC AUTO-ENTMCNC: 32.7 %
MCV RBC AUTO: 86 FL
MONOCYTES # BLD AUTO: 0.7 K/UL
MONOCYTES NFR BLD: 8.9 %
NEUTROPHILS # BLD AUTO: 6.5 K/UL
NEUTROPHILS NFR BLD: 79.3 %
PHOSPHATE SERPL-MCNC: 3.1 MG/DL
PLATELET # BLD AUTO: 271 K/UL
PMV BLD AUTO: 9.4 FL
POTASSIUM SERPL-SCNC: 3.4 MMOL/L
PROT SERPL-MCNC: 7.4 G/DL
PROTHROMBIN TIME: 10.3 SEC
RBC # BLD AUTO: 4.01 M/UL
SODIUM SERPL-SCNC: 139 MMOL/L
WBC # BLD AUTO: 8.2 K/UL

## 2017-07-17 PROCEDURE — 82140 ASSAY OF AMMONIA: CPT

## 2017-07-17 PROCEDURE — 86900 BLOOD TYPING SEROLOGIC ABO: CPT

## 2017-07-17 PROCEDURE — 86901 BLOOD TYPING SEROLOGIC RH(D): CPT

## 2017-07-17 PROCEDURE — 84100 ASSAY OF PHOSPHORUS: CPT

## 2017-07-17 PROCEDURE — 85610 PROTHROMBIN TIME: CPT

## 2017-07-17 PROCEDURE — 80048 BASIC METABOLIC PNL TOTAL CA: CPT

## 2017-07-17 PROCEDURE — 80076 HEPATIC FUNCTION PANEL: CPT

## 2017-07-17 PROCEDURE — 85025 COMPLETE CBC W/AUTO DIFF WBC: CPT

## 2017-07-17 PROCEDURE — 11000001 HC ACUTE MED/SURG PRIVATE ROOM

## 2017-07-17 PROCEDURE — 83605 ASSAY OF LACTIC ACID: CPT

## 2017-07-17 PROCEDURE — 85730 THROMBOPLASTIN TIME PARTIAL: CPT

## 2017-07-17 PROCEDURE — 83735 ASSAY OF MAGNESIUM: CPT

## 2017-07-17 PROCEDURE — 82105 ALPHA-FETOPROTEIN SERUM: CPT

## 2017-07-17 RX ORDER — OXYCODONE HYDROCHLORIDE 5 MG/1
10 TABLET ORAL EVERY 4 HOURS PRN
Status: DISCONTINUED | OUTPATIENT
Start: 2017-07-18 | End: 2017-07-20 | Stop reason: HOSPADM

## 2017-07-17 RX ORDER — IBUPROFEN 200 MG
24 TABLET ORAL
Status: DISCONTINUED | OUTPATIENT
Start: 2017-07-17 | End: 2017-07-20 | Stop reason: HOSPADM

## 2017-07-17 RX ORDER — GLUCAGON 1 MG
1 KIT INJECTION
Status: DISCONTINUED | OUTPATIENT
Start: 2017-07-17 | End: 2017-07-20 | Stop reason: HOSPADM

## 2017-07-17 RX ORDER — ENOXAPARIN SODIUM 100 MG/ML
90 INJECTION SUBCUTANEOUS DAILY
Status: DISCONTINUED | OUTPATIENT
Start: 2017-07-18 | End: 2017-07-18

## 2017-07-17 RX ORDER — IBUPROFEN 200 MG
16 TABLET ORAL
Status: DISCONTINUED | OUTPATIENT
Start: 2017-07-17 | End: 2017-07-20 | Stop reason: HOSPADM

## 2017-07-17 RX ORDER — BENZONATATE 100 MG/1
100 CAPSULE ORAL 3 TIMES DAILY PRN
Status: DISCONTINUED | OUTPATIENT
Start: 2017-07-18 | End: 2017-07-20 | Stop reason: HOSPADM

## 2017-07-17 RX ORDER — OXYCODONE HYDROCHLORIDE 5 MG/1
5 TABLET ORAL EVERY 4 HOURS PRN
Status: DISCONTINUED | OUTPATIENT
Start: 2017-07-18 | End: 2017-07-20 | Stop reason: HOSPADM

## 2017-07-17 RX ADMIN — BENZONATATE 100 MG: 100 CAPSULE ORAL at 11:07

## 2017-07-17 NOTE — PLAN OF CARE
"Direct Admit from Clinic Acceptance Note    Clinic Physician or Mid-Level provider/Clinic giving report: Dr. ALEXANDER SANCHES from Liver Transplant clinic    Accepting Physician for admission to hospital: Sahra Sifuentes     Date of acceptance: 07/17/2017     Reason for direct admission from clinic or home: LIVER MASS, ABD PAIN, PULM EMBOLISM    Report from Clinic Physician/Mid-Level Provider: 66 y.o. female with 65 y/o female with transaminitis (ALT/AST 100s) with ALP 500s being followed by Dr. Sanches, negative liver biopsy and RLE DVT on Eliquis. She was recently admitted and found to have a pulmonary embolus and was discharged on Lovenox. Imaging of the liver revealed a " Large infiltrative process involving the left liver concerning for malignancy including cholangiocarcinoma.  Biopsy recommended." Pt is lots of pain, requiring better control and also needs workup of her liver mass.     To Do List upon arrival: consult IR for biopsy. Consult heme onc for help with anticoagulation.     Please call extension [phone] upon patient arrival to floor for Hospital Medicine admit team assignment and for additional admit orders for the patient.    "

## 2017-07-17 NOTE — TELEPHONE ENCOUNTER
Attempted to call patient, spoke with patient's daughter Benitez. Informed her that Dr Sanches will be admitting patient to hospital today with diagnosis of Left lobe Liver Mass. And patient needs to report to [facility] Admission Office on 1st floor. Also we can not give a exact time of when the bed will be available for patient. Patient's daughter says her mother would like to have a private room due to patient has coughing spells, and would not want to bother a room mate.Also, if there is not a private room available she will wait until tomorrow to be admitted. And if I could call and ask Dr Sanches to request a private room. Informed daughter Benitez  that I will contact Dr Sanches regarding her request.   Alisjacqui  verbalizes understanding.    Called and spoke with Dr Sanches informed him of patient's request for private room. Nurse instructed to call Admissions to request private room.    Called and spoke with Jose in Medicine Admissions. Requested private room for patient per MD orders. Jose says they will accommodate patient for a private room.  Called and spoke with patient to inform her that Admissions will accommodate her with a private room. Also informed patient that we don't have an exact time when bed will be available. Patient says thank you and I will leave to come to the hospital after evening traffic at 5 pm. Informed patient that will be okay.

## 2017-07-17 NOTE — PLAN OF CARE
"Cm called IR and spoke with Tawanna about   [facility] - Interventional Rad.    Specialty:  Interventional Radiology  Why:  call monday to schedule biopsy tuesday, urgent   Contact information:  [facility]  [facility] [address] [phone]  Additional information:  2nd Floor      Tawanna stated that NP has to place an order and it can't be an "amb referral."  NP Kylah notified to place order instead of amb referral.      13:34- left msg for Tawanna with IR re: new order being written by NP and CM called pt and spoke with daughter who answered phone re: IR to contact them with appt date/time once scheduled. Pt wants to know if she should hold "blood thinner:"  YOSELIN left this msg on voicemail as well.  "

## 2017-07-17 NOTE — PLAN OF CARE
Pt dc over the weekend  But per dc summary.  Follow-up Information      Dwayne Sanches MD.    Specialties:  Hepatology, Gastroenterology, Transplant  Why:  Spoke to Hepatology Clinic - left msg for follow up.   Contact information:  [facility] [address]  [phone]     Cm sent msg via  [employer] for this appt to be made.   They will contact pt with appt date/time since pt was dc over the w/e              Nabeel Hobbs MD.    Specialties:  Hematology and Oncology, Hematology  Why:  urgent f/u biopsy   [facility] [address]  [phone]      CM sent msg to Nurse Eng for this f/u appt. They will contact pt with appt date /time since pt dc over the w/e           [facility] - Interventional Rad.    Specialty:  Interventional Radiology  Why:  call monday to schedule biopsy tuesday, urgent   Contact information:  [facility] [id number] [phone]  Additional information:  2nd Floor    Cm left msg for IR at [phone] for this urgent appt to be made on Tues.

## 2017-07-17 NOTE — PLAN OF CARE
07/17/17 0852   Final Note   Assessment Type Final Discharge Note   Discharge Disposition Home     Follow-up Information      Dwayne Sanches MD.    Specialties:  Hepatology, Gastroenterology, Transplant  Why:  Spoke to [place]atology Clinic [place] [facility] [place]   Contact information:  [facility] [id number] [phone]                 Nabeel Hobbs MD.    Specialties:  Hematology and Oncology, Hematology  Why:  urgent f/u biopsy   Contact information:  [address] [place] [address] [id number] [phone]                 [facility] - Interventional Rad.    Specialty:  Interventional Radiology  Why:  call monday to schedule biopsy tuesday, urgent   Contact information:  [address] [place] [id number] [phone]  Additional information:  2nd Floor

## 2017-07-17 NOTE — TELEPHONE ENCOUNTER
Received message from Michelle in Case Management to call and schedule an post hospital  discharge appointment with Dr Sanches. Called and spoke with patient to schedule appointment, first available appointment given on 7/27/17. Patient says she would like to be put on list if anyone cancels to move her appointment up. Informed her I would put her on the wait list. Also, patient says she would like to schedule a Liver Biopsy. Informed her that she has to come in for her clinic visit to see Dr Sanches and he will have to give orders for her to have a Liver Biopsy done. Patient says she would like Provider to know that she was in the hospital. Informed patient that I will send Provider a message to let him know.   Patient verbalizes understanding.

## 2017-07-18 ENCOUNTER — TELEPHONE (OUTPATIENT)
Dept: INTERNAL MEDICINE | Facility: CLINIC | Age: 67
End: 2017-07-18

## 2017-07-18 LAB
ALBUMIN SERPL BCP-MCNC: 2.9 G/DL
ALBUMIN SERPL BCP-MCNC: 3 G/DL
ALP SERPL-CCNC: 862 U/L
ALP SERPL-CCNC: 872 U/L
ALT SERPL W/O P-5'-P-CCNC: 421 U/L
ALT SERPL W/O P-5'-P-CCNC: 426 U/L
ANION GAP SERPL CALC-SCNC: 8 MMOL/L
ANION GAP SERPL CALC-SCNC: 8 MMOL/L
APTT BLDCRRT: 24.3 SEC
AST SERPL-CCNC: 285 U/L
AST SERPL-CCNC: 291 U/L
BASOPHILS # BLD AUTO: 0.01 K/UL
BASOPHILS # BLD AUTO: 0.02 K/UL
BASOPHILS NFR BLD: 0.1 %
BASOPHILS NFR BLD: 0.3 %
BILIRUB SERPL-MCNC: 0.8 MG/DL
BILIRUB SERPL-MCNC: 0.9 MG/DL
BUN SERPL-MCNC: 14 MG/DL
BUN SERPL-MCNC: 15 MG/DL
CALCIUM SERPL-MCNC: 9 MG/DL
CALCIUM SERPL-MCNC: 9.3 MG/DL
CHLORIDE SERPL-SCNC: 104 MMOL/L
CHLORIDE SERPL-SCNC: 105 MMOL/L
CO2 SERPL-SCNC: 24 MMOL/L
CO2 SERPL-SCNC: 25 MMOL/L
CREAT SERPL-MCNC: 0.6 MG/DL
CREAT SERPL-MCNC: 0.7 MG/DL
DIFFERENTIAL METHOD: ABNORMAL
DIFFERENTIAL METHOD: ABNORMAL
EOSINOPHIL # BLD AUTO: 0.1 K/UL
EOSINOPHIL # BLD AUTO: 0.1 K/UL
EOSINOPHIL NFR BLD: 1.6 %
EOSINOPHIL NFR BLD: 1.7 %
ERYTHROCYTE [DISTWIDTH] IN BLOOD BY AUTOMATED COUNT: 14.1 %
ERYTHROCYTE [DISTWIDTH] IN BLOOD BY AUTOMATED COUNT: 14.2 %
EST. GFR  (AFRICAN AMERICAN): >60 ML/MIN/1.73 M^2
EST. GFR  (AFRICAN AMERICAN): >60 ML/MIN/1.73 M^2
EST. GFR  (NON AFRICAN AMERICAN): >60 ML/MIN/1.73 M^2
EST. GFR  (NON AFRICAN AMERICAN): >60 ML/MIN/1.73 M^2
FACT X PPP CHRO-ACNC: 0.79 IU/ML
GLUCOSE SERPL-MCNC: 103 MG/DL
GLUCOSE SERPL-MCNC: 105 MG/DL
HCT VFR BLD AUTO: 33.1 %
HCT VFR BLD AUTO: 33.6 %
HGB BLD-MCNC: 10.5 G/DL
HGB BLD-MCNC: 10.9 G/DL
INR PPP: 0.9
LYMPHOCYTES # BLD AUTO: 0.8 K/UL
LYMPHOCYTES # BLD AUTO: 1 K/UL
LYMPHOCYTES NFR BLD: 10.3 %
LYMPHOCYTES NFR BLD: 13.2 %
MAGNESIUM SERPL-MCNC: 1.9 MG/DL
MCH RBC QN AUTO: 26.9 PG
MCH RBC QN AUTO: 28.1 PG
MCHC RBC AUTO-ENTMCNC: 31.3 %
MCHC RBC AUTO-ENTMCNC: 32.9 %
MCV RBC AUTO: 85 FL
MCV RBC AUTO: 86 FL
MONOCYTES # BLD AUTO: 0.7 K/UL
MONOCYTES # BLD AUTO: 0.8 K/UL
MONOCYTES NFR BLD: 10.7 %
MONOCYTES NFR BLD: 9.3 %
NEUTROPHILS # BLD AUTO: 5.8 K/UL
NEUTROPHILS # BLD AUTO: 6 K/UL
NEUTROPHILS NFR BLD: 75.2 %
NEUTROPHILS NFR BLD: 76.7 %
PLATELET # BLD AUTO: 256 K/UL
PLATELET # BLD AUTO: 280 K/UL
PMV BLD AUTO: 9.3 FL
PMV BLD AUTO: 9.9 FL
POTASSIUM SERPL-SCNC: 3.6 MMOL/L
POTASSIUM SERPL-SCNC: 3.6 MMOL/L
PROT SERPL-MCNC: 6.7 G/DL
PROT SERPL-MCNC: 6.7 G/DL
PROTHROMBIN TIME: 10.1 SEC
RBC # BLD AUTO: 3.88 M/UL
RBC # BLD AUTO: 3.91 M/UL
SODIUM SERPL-SCNC: 136 MMOL/L
SODIUM SERPL-SCNC: 138 MMOL/L
WBC # BLD AUTO: 7.72 K/UL
WBC # BLD AUTO: 7.83 K/UL

## 2017-07-18 PROCEDURE — 85520 HEPARIN ASSAY: CPT

## 2017-07-18 PROCEDURE — 80053 COMPREHEN METABOLIC PANEL: CPT | Mod: 91

## 2017-07-18 PROCEDURE — 63600175 PHARM REV CODE 636 W HCPCS: Performed by: STUDENT IN AN ORGANIZED HEALTH CARE EDUCATION/TRAINING PROGRAM

## 2017-07-18 PROCEDURE — 85610 PROTHROMBIN TIME: CPT

## 2017-07-18 PROCEDURE — 83735 ASSAY OF MAGNESIUM: CPT

## 2017-07-18 PROCEDURE — 11000001 HC ACUTE MED/SURG PRIVATE ROOM

## 2017-07-18 PROCEDURE — 80053 COMPREHEN METABOLIC PANEL: CPT

## 2017-07-18 PROCEDURE — 25000003 PHARM REV CODE 250: Performed by: STUDENT IN AN ORGANIZED HEALTH CARE EDUCATION/TRAINING PROGRAM

## 2017-07-18 PROCEDURE — 85730 THROMBOPLASTIN TIME PARTIAL: CPT

## 2017-07-18 PROCEDURE — 99232 SBSQ HOSP IP/OBS MODERATE 35: CPT | Mod: GC,,, | Performed by: HOSPITALIST

## 2017-07-18 PROCEDURE — 63600175 PHARM REV CODE 636 W HCPCS: Performed by: HOSPITALIST

## 2017-07-18 PROCEDURE — 25000003 PHARM REV CODE 250: Performed by: HOSPITALIST

## 2017-07-18 PROCEDURE — 85025 COMPLETE CBC W/AUTO DIFF WBC: CPT | Mod: 91

## 2017-07-18 PROCEDURE — 36415 COLL VENOUS BLD VENIPUNCTURE: CPT

## 2017-07-18 RX ORDER — POTASSIUM CHLORIDE 750 MG/1
20 CAPSULE, EXTENDED RELEASE ORAL ONCE
Status: COMPLETED | OUTPATIENT
Start: 2017-07-18 | End: 2017-07-18

## 2017-07-18 RX ORDER — HEPARIN SODIUM,PORCINE/D5W 25000/250
17 INTRAVENOUS SOLUTION INTRAVENOUS CONTINUOUS
Status: DISCONTINUED | OUTPATIENT
Start: 2017-07-18 | End: 2017-07-19

## 2017-07-18 RX ORDER — ACETAMINOPHEN 500 MG
500 TABLET ORAL NIGHTLY PRN
COMMUNITY
End: 2017-08-02 | Stop reason: ALTCHOICE

## 2017-07-18 RX ORDER — HEPARIN SODIUM,PORCINE/D5W 25000/250
17 INTRAVENOUS SOLUTION INTRAVENOUS CONTINUOUS
Status: DISCONTINUED | OUTPATIENT
Start: 2017-07-18 | End: 2017-07-18

## 2017-07-18 RX ORDER — RAMELTEON 8 MG/1
8 TABLET ORAL NIGHTLY PRN
Status: DISCONTINUED | OUTPATIENT
Start: 2017-07-18 | End: 2017-07-20 | Stop reason: HOSPADM

## 2017-07-18 RX ORDER — ACETAMINOPHEN 325 MG/1
650 TABLET ORAL EVERY 6 HOURS PRN
Status: DISCONTINUED | OUTPATIENT
Start: 2017-07-18 | End: 2017-07-20 | Stop reason: HOSPADM

## 2017-07-18 RX ORDER — MAGNESIUM SULFATE HEPTAHYDRATE 40 MG/ML
2 INJECTION, SOLUTION INTRAVENOUS ONCE
Status: COMPLETED | OUTPATIENT
Start: 2017-07-18 | End: 2017-07-18

## 2017-07-18 RX ADMIN — ACETAMINOPHEN 650 MG: 325 TABLET ORAL at 08:07

## 2017-07-18 RX ADMIN — BENZONATATE 100 MG: 100 CAPSULE ORAL at 02:07

## 2017-07-18 RX ADMIN — BENZONATATE 100 MG: 100 CAPSULE ORAL at 08:07

## 2017-07-18 RX ADMIN — ENOXAPARIN SODIUM 90 MG: 100 INJECTION SUBCUTANEOUS at 05:07

## 2017-07-18 RX ADMIN — MAGNESIUM SULFATE IN WATER 2 G: 40 INJECTION, SOLUTION INTRAVENOUS at 09:07

## 2017-07-18 RX ADMIN — HEPARIN SODIUM AND DEXTROSE 17 UNITS/KG/HR: 10000; 5 INJECTION INTRAVENOUS at 06:07

## 2017-07-18 RX ADMIN — POTASSIUM CHLORIDE 20 MEQ: 750 CAPSULE, EXTENDED RELEASE ORAL at 10:07

## 2017-07-18 NOTE — PROGRESS NOTES
[facility] Hospital Medicine  Progress Note    Patient Name: Andra Matamoros  MRN: [id number]  Patient Class: IP- Inpatient   Admission Date: 7/17/2017  Length of Stay: 1 days  Attending Physician: Ross Marte MD  Primary Care Provider: Wesly Parr MD    [facility] Medicine Team: [facility] 3 Vinicio Rushing IV, MD    Interval History: No acute events. Reports she feels well. No complaints today. Bridging to heparin today pending Hepatology and IR eval for possible biopsy    Review of Systems   Constitutional: Negative for chills and fever.   HENT: Negative for congestion and rhinorrhea.    Eyes: Negative for discharge and redness.   Respiratory: Negative for cough and shortness of breath.    Cardiovascular: Negative for chest pain and palpitations.   Gastrointestinal: Negative for abdominal pain, diarrhea, nausea and vomiting.   Genitourinary: Negative for dysuria and urgency.   Musculoskeletal: Negative for myalgias and neck pain.   Neurological: Negative for weakness and numbness.   Psychiatric/Behavioral: Negative for agitation and confusion.     Objective:     Vital Signs (Most Recent):  Temp: 97.9 °F (36.6 °C) (07/18/17 0300)  Pulse: 83 (07/18/17 0300)  Resp: 14 (07/18/17 0300)  BP: 108/73 (07/18/17 0300)  SpO2: 97 % (07/18/17 0300) Vital Signs (24h Range):  Temp:  [97.8 °F (36.6 °C)-98.3 °F (36.8 °C)] 97.9 °F (36.6 °C)  Pulse:  [83-97] 83  Resp:  [14-20] 14  SpO2:  [95 %-100 %] 97 %  BP: (108-126)/(59-73) 108/73     Weight: 58.8 kg (129 lb 10.1 oz)  Body mass index is 22.13 kg/m².    Intake/Output Summary (Last 24 hours) at 07/18/17 0848  Last data filed at 07/18/17 0300   Gross per 24 hour   Intake                0 ml   Output                2 ml   Net               -2 ml      Physical Exam   Constitutional: She is oriented to person, place, and time. No distress.   HENT:   Head: Normocephalic and atraumatic.   Eyes: EOM are normal. Pupils are equal, round, and reactive to  light.   Neck: Normal range of motion. Neck supple.   Cardiovascular: Normal rate and regular rhythm.    No murmur heard.  Pulmonary/Chest: Effort normal. No respiratory distress. She has no wheezes.   Abdominal: Soft. She exhibits no distension. There is no tenderness.   Musculoskeletal: Normal range of motion. She exhibits no edema.   Neurological: She is alert and oriented to person, place, and time.   Skin: Skin is warm and dry. She is not diaphoretic.   Psychiatric: She has a normal mood and affect. Her behavior is normal.     Significant Labs:   CBC:   Recent Labs  Lab 07/17/17 2035 07/18/17 0357 07/18/17  0636   WBC 8.20 7.72 7.83   HGB 11.2* 10.5* 10.9*   HCT 34.3* 33.6* 33.1*    280 256     CMP:   Recent Labs  Lab 07/17/17 2035 07/18/17 0357 07/18/17  0636    138 136   K 3.4* 3.6 3.6    105 104   CO2 25 25 24   * 103 105   BUN 18 14 15   CREATININE 0.7 0.7 0.6   CALCIUM 9.4 9.0 9.3   PROT 7.4 6.7 6.7   ALBUMIN 3.3* 2.9* 3.0*   BILITOT 0.8 0.8 0.9   ALKPHOS 957* 872* 862*   * 291* 285*   * 421* 426*   ANIONGAP 10 8 8   EGFRNONAA >60.0 >60.0 >60.0     Significant Imaging: I have reviewed all pertinent imaging results/findings within the past 24 hours.    Assessment/Plan:      Acute pulmonary embolism bilateral    -Stop lovenox, got this AM, bridge to heparin gtt  -PT/INR daily  -Will continue to follow          * Liver mass    -Had outpatient biopsy scheduled, but contacted clinic due to concern of delay of biopsy and recommended by Hepatology to speed up biopsy  -Negative biopsy 03/2017  -CT Abd/Pelvis 06/2017 with enlarged liver, hypodense area involving left hepatic lobe which was new from 03/2017. No ductal dilation noted.   -IR consulted for biopsy evaluation  -Hepatology consulted for recs, appreciate help          VTE Risk Mitigation         Ordered     Reason for No Pharmacological VTE Prophylaxis  Once      07/17/17 2143     High Risk of VTE  Once       07/17/17 2143          Vinicio Rushing IV, MD  Department of Hospital Medicine   [facility]

## 2017-07-18 NOTE — ASSESSMENT & PLAN NOTE
-Had outpatient biopsy scheduled, but contacted clinic due to concern of delay of biopsy and recommended by Hepatology to speed up biopsy  -Negative biopsy 03/2017  -CT Abd/Pelvis 06/2017 with enlarged liver, hypodense area involving left hepatic lobe which was new from 03/2017. No ductal dilation noted.   -IR consulted for biopsy evaluation  -Hepatology consulted for recs, appreciate help

## 2017-07-18 NOTE — PLAN OF CARE
"Problem: Patient Care Overview  Goal: Plan of Care Review  Outcome: Ongoing (interventions implemented as appropriate)  Patient was a direct admit and arrived on unit just after 2000 7/17/17.  Dr. Parra and team assessed pt at bedside, questions addressed, plan of care reviewed.  Safety:  call light in reach, patient oriented to room & instructed how to notify nurse if assistance is needed, questions & concerns addressed, bed in lowest position with wheels locked & side rails up X 2, fall precautions followed, patient free from fall & injury thus far this shift;  VTE/bleeding precautions maintained.  Activity:  patient up with assistance, weight shifted at least every other hour.  Neurological:  patient A&O X 4, follows commands, equal  strength & dorsi/plantarflexion, neuro checks performed as ordered & WDL.  Respiratory:  patient tolerates room air without distress, denies SOB, pt does have mild dry non-productive cough which worsens with activity, notified Dr. Parra, administered benzonatate as ordered, pt expressed relief.  Cardiac:  Denies chest pain, BP stable.  HR stable.  Afebrile this shift.  GI:  Patient tolerates PO intake well, denies nausea,  LBM 7/17/2017 per pt statement.  :  patient voids clear yellow urine without foul odor spontaneously & without difficulty, adequate output for shift.  Skin:  CDI.  Devices:  PIV CDI, negative for s/sx of infection & infiltration.  Pain:  patient states she has mild "discomfort" in the abdomen RUQ as well as mild chronic low back pain but thus far denied need for medicine.  Will continue to monitor patient.  Dr. Miller stated the liver biopsy is most likely not going to be done in the next 12 hours and that it is okay to give the scheduled dose of enoxaparin this AM.      "

## 2017-07-18 NOTE — PHARMACY MED REC
"MedMined Medication Reconciliation  Template    Patient was admitted on 7/17/2017 for Liver mass.    Patient's prior to admission medication regimen was as follows:  Prescriptions Prior to Admission   Medication Sig Dispense Refill Last Dose    acetaminophen (TYLENOL) 500 MG tablet Take 500 mg by mouth nightly as needed for Pain.       enoxaparin (LOVENOX) 100 mg/mL Syrg Inject 0.9 mLs (90 mg total) into the skin once daily at 6am. 30 Syringe 2 7/17/2017 at Unknown time       Please add appropriate    SmartPhrase below:  Admission Medication Reconciliation - Pharmacy Consult Note    The home medication history was taken by Kelin Escobar, pharmacy technician.  Based on information gathered and subsequent review by the clinical pharmacist, the items below may need attention.    You may go to "Admission" then "Reconcile Home Medications" tabs to review and/or act upon these items.      No issues noted with the medication reconciliation.      Please address this information as you see fit.  Feel free to contact us if you have any questions or require assistance.    Lisa Valdez  EXT [phone]      "

## 2017-07-18 NOTE — HPI
[facility]  History & Physical    SUBJECTIVE:     Chief Complaint/Reason for Admission: Weakness and fatigue     History of Present Illness:  Patient is a 66 y.o. female presents with weakness and fatigue. Onset of symptoms was gradual starting 7 days ago with unchanged course since that time. Patient denies chest pain, shortness of breath, leg pain, redness or swelling. Symptoms are aggravated by eating. Symptoms improve with reduced PO intake. Patient states that she was recently admitted to [facility] with MI, complaining of chest pain, left arm pain, weakness and fatigue. The patient states that she has a liver obstruction and is followed by Dr. Sanches. Identified large liver mass concerning for cholangiocarcinoma. Second liver biopsy has been scheduled. Patient has a medical history significant for PE, NSTEMI, blood clot, liver mass.     PTA Medications   Medication Sig    enoxaparin (LOVENOX) 100 mg/mL Syrg Inject 0.9 mLs (90 mg total) into the skin once daily at 6am.    Lactobacillus rhamnosus GG (CULTURELLE) 10 billion cell capsule Take 1 capsule by mouth 2 (two) times daily.    alprazolam (XANAX) 0.25 MG tablet Take 1 tablet (0.25 mg total) by mouth 2 (two) times daily as needed for Anxiety (mri).       Review of patient's allergies indicates:  No Known Allergies    Past Medical History:   Diagnosis Date    Blood clot in vein     right lower extremity     Past Surgical History:   Procedure Laterality Date     SECTION      CHOLECYSTECTOMY      TONSILLECTOMY       Family History   Problem Relation Age of Onset    Breast cancer Sister 60    Cancer Father     Vasculitis Mother     Paget's disease of bone Mother     No Known Problems Brother     No Known Problems Daughter     No Known Problems Son     Colon cancer Neg Hx     Ovarian cancer Neg Hx      Social History   Substance Use Topics    Smoking status: Never Smoker    Smokeless tobacco: Never Used    Alcohol use  Yes      Comment: [place] periodically        Review of Systems:  Constitutional: no fever or chills  Eyes: no visual changes  Respiratory: positive for cough  Cardiovascular: no chest pain or palpitations  Gastrointestinal: positive for abdominal pain, negative for constipation, diarrhea, nausea and vomiting  Hematologic/Lymphatic: negative for lower extremity erythema, tenderness  Musculoskeletal: no arthralgias or myalgias  Neurological: no seizures or tremors    OBJECTIVE:     Vital Signs (Most Recent):  Temp: 97.8 °F (36.6 °C) (07/17/17 2015)  Pulse: 97 (07/17/17 2015)  Resp: 20 (07/17/17 2015)  BP: (!) 112/59 (07/17/17 2015)  SpO2: 95 % (07/17/17 2015)    Physical Exam:  General: well developed, well nourished, no distress  Lungs:  clear to auscultation bilaterally and normal respiratory effort  Cardiovascular: Heart: regular rate and rhythm, S1, S2 normal, no murmur, click, rub or gallop. Chest Wall: no tenderness. Extremities: no cyanosis or edema, or clubbing and Homans sign is negative, no sign of DVT. Pulses: 2+ and symmetric.  Abdomen/Rectal: Abdomen: normal findings: bowel sounds normal, no masses palpable and no organomegaly and abnormal findings:  epigastric tenderness. Rectal: normal tone, no masses or tenderness and not examined  Musculoskeletal:no clubbing, cyanosis  Neurologic: Normal strength and tone. No focal numbness or weakness    Laboratory:  CBC:   Recent Labs  Lab 07/17/17 2035   WBC 8.20   RBC 4.01   HGB 11.2*   HCT 34.3*      MCV 86   MCH 27.9   MCHC 32.7     CMP:   Recent Labs  Lab 07/17/17 2035   *   CALCIUM 9.4   ALBUMIN 3.3*   PROT 7.4      K 3.4*   CO2 25      BUN 18   CREATININE 0.7   ALKPHOS 957*   *   *   BILITOT 0.8     LFTs:   Recent Labs  Lab 07/17/17 2035   *   *   ALKPHOS 957*   BILITOT 0.8   PROT 7.4   ALBUMIN 3.3*     Coagulation:   Recent Labs  Lab 07/17/17 2035   LABPROT 10.3   INR 1.0   APTT 23.0        Diagnostic Results:  Labs: Reviewed  as of 2155    ASSESSMENT/PLAN:     -Patient is a 66 y.o. female presents with weakness and fatigue.     -Weakness/fatigue:  -CBC ordered, patient does not appear clinically amenic  -Afebrile, VSS    -Livermass:  -Followed by [facility] hepatology, scheduled biopsy  -Ordered PT/INR, continued lovenox    -PE:  -Continue prophylactic anticoagulation   -Previous echo showed no RV compromise, normal EF  -Ordered PT/INR, continued lovenox      Plan: Continue Current

## 2017-07-18 NOTE — H&P
[facility] Hospital Medicine  History & Physical    Patient Name: Andra Matamoros  MRN: [id number]  Admission Date: 2017  Attending Physician: Ross Marte MD   Primary Care Provider: Wesly Parr MD    [facility] Medicine Team: [facility] HOSP MED 3 Vazquez Miller MD     Patient information was obtained from patient and ER records.     Subjective:     Principal Problem:Liver mass    Chief Complaint: Fatigue and weakness     HPI:   [facility]  History & Physical    SUBJECTIVE:     Chief Complaint/Reason for Admission: Weakness and fatigue    History of Present Illness:  Patient is a 66 y.o. female presents with weakness and fatigue. Onset of symptoms was gradual starting 7 days ago with unchanged course since that time. Patient denies chest pain, shortness of breath, fever, leg pain/swelling/redness. Symptoms are aggravated by PO intake. Symptoms improve with decreasing PO intake. Patient states that she was recently admitted to [facility] for NSTEMI and PE. Patient was discharged on lovenox 90mg daily. Cardiac echo was found to be benign with normal EF. Patient also has a history of a liver mass for which she is followed by [facility] hepatology. Imaging regarding liver mass was concerning for cholangiocarcinoma.     PTA Medications   Medication Sig    enoxaparin (LOVENOX) 100 mg/mL Syrg Inject 0.9 mLs (90 mg total) into the skin once daily at 6am.    Lactobacillus rhamnosus GG (CULTURELLE) 10 billion cell capsule Take 1 capsule by mouth 2 (two) times daily.    alprazolam (XANAX) 0.25 MG tablet Take 1 tablet (0.25 mg total) by mouth 2 (two) times daily as needed for Anxiety (mri).       Review of patient's allergies indicates:  No Known Allergies    Past Medical History:   Diagnosis Date    Blood clot in vein     right lower extremity     Past Surgical History:   Procedure Laterality Date     SECTION      CHOLECYSTECTOMY      TONSILLECTOMY       Family  History   Problem Relation Age of Onset    Breast cancer Sister 60    Cancer Father     Vasculitis Mother     Paget's disease of bone Mother     No Known Problems Brother     No Known Problems Daughter     No Known Problems Son     Colon cancer Neg Hx     Ovarian cancer Neg Hx      Social History   Substance Use Topics    Smoking status: Never Smoker    Smokeless tobacco: Never Used    Alcohol use Yes      Comment: [place] periodically        Review of Systems:  Eyes: no visual changes  Respiratory: positive for cough, negative for pleurisy/chest pain  Cardiovascular: no chest pain or palpitations  Gastrointestinal: no nausea or vomiting, no abdominal pain or change in bowel habits  Hematologic/Lymphatic: no easy bruising or lymphadenopathy  Musculoskeletal: no arthralgias or myalgias  Neurological: no seizures or tremors    OBJECTIVE:     Vital Signs (Most Recent):  Temp: 97.8 °F (36.6 °C) (07/17/17 2015)  Pulse: 97 (07/17/17 2015)  Resp: 20 (07/17/17 2015)  BP: (!) 112/59 (07/17/17 2015)  SpO2: 95 % (07/17/17 2015)    Physical Exam:  General: well developed, well nourished  Lungs:  clear to auscultation bilaterally and normal respiratory effort  Cardiovascular: Heart: regular rate and rhythm, S1, S2 normal, no murmur, click, rub or gallop. Chest Wall: no tenderness. Extremities: no cyanosis or edema, or clubbing. Pulses: 2+ and symmetric.  Abdomen/Rectal: Abdomen: soft, non-tender non-distented; bowel sounds normal; no masses,  no organomegaly. Rectal: normal tone, no masses or tenderness  Skin: Skin color, texture, turgor normal. No rashes or lesions  Musculoskeletal:no clubbing, cyanosis    Laboratory:  CBC:   Recent Labs  Lab 07/17/17 2035   WBC 8.20   RBC 4.01   HGB 11.2*   HCT 34.3*      MCV 86   MCH 27.9   MCHC 32.7     CMP:   Recent Labs  Lab 07/17/17 2035   *   CALCIUM 9.4   ALBUMIN 3.3*   PROT 7.4      K 3.4*   CO2 25      BUN 18   CREATININE 0.7   ALKPHOS 957*    *   *   BILITOT 0.8     LFTs:   Recent Labs  Lab 07/17/17 2035   *   *   ALKPHOS 957*   BILITOT 0.8   PROT 7.4   ALBUMIN 3.3*     Coagulation:   Recent Labs  Lab 07/17/17 2035   LABPROT 10.3   INR 1.0   APTT 23.0       Diagnostic Results:  Labs: Reviewed  as of 2155    ASSESSMENT/PLAN:   Patient is a 66 y.o. female presents with weakness and fatigue.    Weakness and fatigue:  -CBC ordered, no clinical signs of anemia, will continue to follow  -Afebrile, VSS    PE:  -Continue home dose lovenox  -PT/INR ordered  -No clinical signs of DVT at time of exam    Liver mass:  -[facility], biopsy scheduled  -CBC/PT/INR/CMP ordered, will follow    No new subjective & objective note has been filed under this hospital service since the last note was generated.    Assessment/Plan:     Acute pulmonary embolism bilateral    -Patient on lovenox  -PT/INR ordered  -Will continue to follow          * Liver mass    -Patient followed by [facility] hepatology  -Biopsy scheduled  -Will continue to follow and trend labs            VTE Risk Mitigation         Ordered     Reason for No Pharmacological VTE Prophylaxis  Once      07/17/17 2143     High Risk of VTE  Once      07/17/17 2143        Vazquez Miller MD  Department of Hospital Medicine   [facility]    I HAVE PERSONALLY TAKEN THE HISTORY, EXAMINED THIS PATIENT,  AND AGREE WITH THE RESIDENT'S NOTE AS STATED ABOVE WITH THE FOLLOWING EXCEPTIONS:  Patient seen and examined with the intern and medical student at 11:30pm on 7/17/17    Ms. 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  This was complicated by recent NSTEMI and PTE.  She has been on treatment dose Lovenox since that time. She underwent liver biopsy 3/17, which was negative for cancer, though this was thought likely sampling error and repeat biopsy was warranted.      Assessment and plan:  Liver mass, likely malignancy  -Hepatology consult  -Repeat liver us doppler in am  -Defer repeat CT or MRI to Hepatology in am  -NPO after midnight - she needs a tissue diagnosis ASAP  By MRI:  -Large infiltrative process involving the left liver concerning for malignancy including cholangiocarcinoma  -Biopsy was negative, though likely represents sampling error  -Therapy with treatment dose Lovenox complicates the timing of re-biopsy (would need IV Heparin bridging)  -Bleeding risk post biopsy would also be elevated    Subacute pulmonary thromboembolism  -bilateral intraluminal filling defects including the superior segmental/subsegmental arteries of the RIGHT lower lobe and subsegmental lingula  -Will discuss future plans for surgery, biopsy, etc with Hepatology: if anticoagulation will need to be interrupted for a prolonged period, she may benefit from temporary Laurence    Questionable portal vein and/or hepatic vein thrombosis  -Per doppler US:  -No definite portal venous thrombosis identified although there is questionable slow flow in the left portal vein. Left hepatic vein is not visualized and may be thrombosed  -Per CT abdomen:  -Interval development of heterogeneous primarily hypodense area involving the majority of the left hepatic lobe.  This may represent infarction,  tumor, or focal fatty change.    -Nonvisualization of the left portal vein and recent development favor infarct.    Temp:  [97.8 °F (36.6 °C)]   Pulse:  [97]   Resp:  [20]   BP: (112)/(59)   SpO2:  [95 %]      FINAL PATHOLOGIC DIAGNOSIS  LIVER (NEEDLE BIOPSY):  -Mild mixed steatosis, 5%  -Otherwise unremarkable liver  Comments: Special stains in microscopic section. Three out of total 3 portal tracts contain bile ducts without  evidence of injury. No granulomas.  Diagnosed by: Karen Cabrera M.D.  (Electronically Signed: 2017-04-04 16:29:45)  Microscopic Examination  Trichrome stain: No fibrosis  Iron  stain: Negative  PASD stain: Negative for hyaline globules  Copper staining: Negative  Keratin stain: Positive in bile ducts  Iron, trichrome, PASD, Copper, keratin 7 stains with appropriate controls    Recent Results (from the past 24 hour(s))   Basic metabolic panel    Collection Time: 07/17/17  8:35 PM   Result Value Ref Range    Sodium 139 136 - 145 mmol/L    Potassium 3.4 (L) 3.5 - 5.1 mmol/L    Chloride 104 95 - 110 mmol/L    CO2 25 23 - 29 mmol/L    Glucose 118 (H) 70 - 110 mg/dL    BUN, Bld 18 8 - 23 mg/dL    Creatinine 0.7 0.5 - 1.4 mg/dL    Calcium 9.4 8.7 - 10.5 mg/dL    Anion Gap 10 8 - 16 mmol/L    eGFR if African American >60.0 >60 mL/min/1.73 m^2    eGFR if non African American >60.0 >60 mL/min/1.73 m^2   Hepatic function panel    Collection Time: 07/17/17  8:35 PM   Result Value Ref Range    Total Protein 7.4 6.0 - 8.4 g/dL    Albumin 3.3 (L) 3.5 - 5.2 g/dL    Total Bilirubin 0.8 0.1 - 1.0 mg/dL    Bilirubin, Direct 0.5 (H) 0.1 - 0.3 mg/dL     (H) 10 - 40 U/L     (H) 10 - 44 U/L    Alkaline Phosphatase 957 (H) 55 - 135 U/L   Protime-INR    Collection Time: 07/17/17  8:35 PM   Result Value Ref Range    Prothrombin Time 10.3 9.0 - 12.5 sec    INR 1.0 0.8 - 1.2   APTT    Collection Time: 07/17/17  8:35 PM   Result Value Ref Range    aPTT 23.0 21.0 - 32.0 sec   Lactic acid, plasma    Collection Time: 07/17/17  8:35 PM   Result Value Ref Range    Lactate (Lactic Acid) 0.7 0.5 - 2.2 mmol/L   Magnesium    Collection Time: 07/17/17  8:35 PM   Result Value Ref Range    Magnesium 1.9 1.6 - 2.6 mg/dL   Phosphorus    Collection Time: 07/17/17  8:35 PM   Result Value Ref Range    Phosphorus 3.1 2.7 - 4.5 mg/dL   CBC auto differential    Collection Time: 07/17/17  8:35 PM   Result Value Ref Range    WBC 8.20 3.90 - 12.70 K/uL    RBC 4.01 4.00 - 5.40 M/uL    Hemoglobin 11.2 (L) 12.0 - 16.0 g/dL    Hematocrit 34.3 (L) 37.0 - 48.5 %    MCV 86 82 - 98 fL    MCH 27.9 27.0 - 31.0 pg    MCHC 32.7 32.0 -  36.0 %    RDW 14.1 11.5 - 14.5 %    Platelets 271 150 - 350 K/uL    MPV 9.4 9.2 - 12.9 fL    Gran # 6.5 1.8 - 7.7 K/uL    Lymph # 0.9 (L) 1.0 - 4.8 K/uL    Mono # 0.7 0.3 - 1.0 K/uL    Eos # 0.0 0.0 - 0.5 K/uL    Baso # 0.02 0.00 - 0.20 K/uL    Gran% 79.3 (H) 38.0 - 73.0 %    Lymph% 10.6 (L) 18.0 - 48.0 %    Mono% 8.9 4.0 - 15.0 %    Eosinophil% 0.5 0.0 - 8.0 %    Basophil% 0.2 0.0 - 1.9 %    Differential Method Automated    AFP tumor marker    Collection Time: 07/17/17  8:35 PM   Result Value Ref Range    AFP 3.3 0.0 - 8.4 ng/mL   Ammonia    Collection Time: 07/17/17  8:35 PM   Result Value Ref Range    Ammonia 33 10 - 50 umol/L   Type & Screen    Collection Time: 07/17/17  8:35 PM   Result Value Ref Range    Group & Rh O POS     Indirect Ethan NEG

## 2017-07-18 NOTE — SUBJECTIVE & OBJECTIVE
Interval History: No acute events. Reports she feels well. No complaints today. Bridging to heparin today pending Hepatology and IR eval for possible biopsy    Review of Systems   Constitutional: Negative for chills and fever.   HENT: Negative for congestion and rhinorrhea.    Eyes: Negative for discharge and redness.   Respiratory: Negative for cough and shortness of breath.    Cardiovascular: Negative for chest pain and palpitations.   Gastrointestinal: Negative for abdominal pain, diarrhea, nausea and vomiting.   Genitourinary: Negative for dysuria and urgency.   Musculoskeletal: Negative for myalgias and neck pain.   Neurological: Negative for weakness and numbness.   Psychiatric/Behavioral: Negative for agitation and confusion.     Objective:     Vital Signs (Most Recent):  Temp: 97.9 °F (36.6 °C) (07/18/17 0300)  Pulse: 83 (07/18/17 0300)  Resp: 14 (07/18/17 0300)  BP: 108/73 (07/18/17 0300)  SpO2: 97 % (07/18/17 0300) Vital Signs (24h Range):  Temp:  [97.8 °F (36.6 °C)-98.3 °F (36.8 °C)] 97.9 °F (36.6 °C)  Pulse:  [83-97] 83  Resp:  [14-20] 14  SpO2:  [95 %-100 %] 97 %  BP: (108-126)/(59-73) 108/73     Weight: 58.8 kg (129 lb 10.1 oz)  Body mass index is 22.13 kg/m².    Intake/Output Summary (Last 24 hours) at 07/18/17 0848  Last data filed at 07/18/17 0300   Gross per 24 hour   Intake                0 ml   Output                2 ml   Net               -2 ml      Physical Exam   Constitutional: She is oriented to person, place, and time. No distress.   HENT:   Head: Normocephalic and atraumatic.   Eyes: EOM are normal. Pupils are equal, round, and reactive to light.   Neck: Normal range of motion. Neck supple.   Cardiovascular: Normal rate and regular rhythm.    No murmur heard.  Pulmonary/Chest: Effort normal. No respiratory distress. She has no wheezes.   Abdominal: Soft. She exhibits no distension. There is no tenderness.   Musculoskeletal: Normal range of motion. She exhibits no edema.   Neurological:  She is alert and oriented to person, place, and time.   Skin: Skin is warm and dry. She is not diaphoretic.   Psychiatric: She has a normal mood and affect. Her behavior is normal.     Significant Labs:   CBC:   Recent Labs  Lab 07/17/17 2035 07/18/17 0357 07/18/17  0636   WBC 8.20 7.72 7.83   HGB 11.2* 10.5* 10.9*   HCT 34.3* 33.6* 33.1*    280 256     CMP:   Recent Labs  Lab 07/17/17 2035 07/18/17 0357 07/18/17  0636    138 136   K 3.4* 3.6 3.6    105 104   CO2 25 25 24   * 103 105   BUN 18 14 15   CREATININE 0.7 0.7 0.6   CALCIUM 9.4 9.0 9.3   PROT 7.4 6.7 6.7   ALBUMIN 3.3* 2.9* 3.0*   BILITOT 0.8 0.8 0.9   ALKPHOS 957* 872* 862*   * 291* 285*   * 421* 426*   ANIONGAP 10 8 8   EGFRNONAA >60.0 >60.0 >60.0     Significant Imaging: I have reviewed all pertinent imaging results/findings within the past 24 hours.

## 2017-07-18 NOTE — ASSESSMENT & PLAN NOTE
-Patient followed by [facility] hepatology  -Biopsy scheduled  -Will continue to follow and trend labs

## 2017-07-18 NOTE — HPI
[facility]  History & Physical    SUBJECTIVE:     Chief Complaint/Reason for Admission: Weakness and fatigue    History of Present Illness:  Patient is a 66 y.o. female presents with weakness and fatigue. Onset of symptoms was gradual starting 7 days ago with unchanged course since that time. Patient denies chest pain, shortness of breath, fever, leg pain/swelling/redness. Symptoms are aggravated by PO intake. Symptoms improve with decreasing PO intake. Patient states that she was recently admitted to [facility] for NSTEMI and PE. Patient was discharged on lovenox 90mg daily. Cardiac echo was found to be benign with normal EF. Patient also has a history of a liver mass for which she is followed by [facility] hepatology. Imaging regarding liver mass was concerning for cholangiocarcinoma.     PTA Medications   Medication Sig    enoxaparin (LOVENOX) 100 mg/mL Syrg Inject 0.9 mLs (90 mg total) into the skin once daily at 6am.    Lactobacillus rhamnosus GG (CULTURELLE) 10 billion cell capsule Take 1 capsule by mouth 2 (two) times daily.    alprazolam (XANAX) 0.25 MG tablet Take 1 tablet (0.25 mg total) by mouth 2 (two) times daily as needed for Anxiety (mri).       Review of patient's allergies indicates:  No Known Allergies    Past Medical History:   Diagnosis Date    Blood clot in vein     right lower extremity     Past Surgical History:   Procedure Laterality Date     SECTION      CHOLECYSTECTOMY      TONSILLECTOMY       Family History   Problem Relation Age of Onset    Breast cancer Sister 60    Cancer Father     Vasculitis Mother     Paget's disease of bone Mother     No Known Problems Brother     No Known Problems Daughter     No Known Problems Son     Colon cancer Neg Hx     Ovarian cancer Neg Hx      Social History   Substance Use Topics    Smoking status: Never Smoker    Smokeless tobacco: Never Used    Alcohol use Yes      Comment: [place] periodically        Review  of Systems:  Eyes: no visual changes  Respiratory: positive for cough, negative for pleurisy/chest pain  Cardiovascular: no chest pain or palpitations  Gastrointestinal: no nausea or vomiting, no abdominal pain or change in bowel habits  Hematologic/Lymphatic: no easy bruising or lymphadenopathy  Musculoskeletal: no arthralgias or myalgias  Neurological: no seizures or tremors    OBJECTIVE:     Vital Signs (Most Recent):  Temp: 97.8 °F (36.6 °C) (07/17/17 2015)  Pulse: 97 (07/17/17 2015)  Resp: 20 (07/17/17 2015)  BP: (!) 112/59 (07/17/17 2015)  SpO2: 95 % (07/17/17 2015)    Physical Exam:  General: well developed, well nourished  Lungs:  clear to auscultation bilaterally and normal respiratory effort  Cardiovascular: Heart: regular rate and rhythm, S1, S2 normal, no murmur, click, rub or gallop. Chest Wall: no tenderness. Extremities: no cyanosis or edema, or clubbing. Pulses: 2+ and symmetric.  Abdomen/Rectal: Abdomen: soft, non-tender non-distented; bowel sounds normal; no masses,  no organomegaly. Rectal: normal tone, no masses or tenderness  Skin: Skin color, texture, turgor normal. No rashes or lesions  Musculoskeletal:no clubbing, cyanosis    Laboratory:  CBC:   Recent Labs  Lab 07/17/17 2035   WBC 8.20   RBC 4.01   HGB 11.2*   HCT 34.3*      MCV 86   MCH 27.9   MCHC 32.7     CMP:   Recent Labs  Lab 07/17/17 2035   *   CALCIUM 9.4   ALBUMIN 3.3*   PROT 7.4      K 3.4*   CO2 25      BUN 18   CREATININE 0.7   ALKPHOS 957*   *   *   BILITOT 0.8     LFTs:   Recent Labs  Lab 07/17/17 2035   *   *   ALKPHOS 957*   BILITOT 0.8   PROT 7.4   ALBUMIN 3.3*     Coagulation:   Recent Labs  Lab 07/17/17 2035   LABPROT 10.3   INR 1.0   APTT 23.0       Diagnostic Results:  Labs: Reviewed  as of 2155    ASSESSMENT/PLAN:   Patient is a 66 y.o. female presents with weakness and fatigue.    Weakness and fatigue:  -CBC ordered, no clinical signs of anemia, will continue to  follow  -Afebrile, VSS    PE:  -Continue home dose lovenox  -PT/INR ordered  -No clinical signs of DVT at time of exam    Liver mass:  -Followed by [facility], biopsy scheduled  -CBC/PT/INR/CMP ordered, will follow

## 2017-07-18 NOTE — PROGRESS NOTES
Andra Matamoros  7/10/2017    HPI:  Patient is a 66 y.o. female referred from Dr. BONY Parr after presenting to his clinic complaining of about one month's worth of R leg pain and swelling about one month ago.  She denies a history of hypercoagulability, smoking or hormonal supplementation. Her pain and inflammation were mainly localized to the proximal medial right thigh, though she described intermittent shooting pain down right leg and associated right leg swelling.  Duplex at that time showed complete thrombosis of her R GSV from the distal leg to the saphenofemoral junction.  Because of the overall length of the venous thrombosis and proximity to the SFJ, systemic anticoagulation with eliquis was started.      She presents today reporting that her pain has resolved.  She denies inflammation of the leg, leg swelling and leg pain.         Past Medical History:   Diagnosis Date    Blood clot in vein     right lower extremity     Past Surgical History:   Procedure Laterality Date     SECTION      CHOLECYSTECTOMY      TONSILLECTOMY       Family History   Problem Relation Age of Onset    Breast cancer Sister 60    Cancer Father     Vasculitis Mother     Paget's disease of bone Mother     No Known Problems Brother     No Known Problems Daughter     No Known Problems Son     Colon cancer Neg Hx     Ovarian cancer Neg Hx      Social History     Social History    Marital status:      Spouse name: N/A    Number of children: N/A    Years of education: N/A     Occupational History    Not on file.     Social History Main Topics    Smoking status: Never Smoker    Smokeless tobacco: Never Used    Alcohol use Yes      Comment: [place] periodically    Drug use: No    Sexual activity: Not Currently     Partners: Male     Birth control/ protection: Post-menopausal     Other Topics Concern    Not on file     Social History Narrative    No narrative on file     No current  facility-administered medications on file prior to visit.      No current outpatient prescriptions on file prior to visit.       REVIEW OF SYSTEMS:  General: negative; ENT: negative; Allergy and Immunology: negative; Hematological and Lymphatic: Negative; Endocrine: negative; Respiratory: no cough, shortness of breath, or wheezing; Cardiovascular: no chest pain or dyspnea on exertion; Gastrointestinal: no abdominal pain/back, change in bowel habits, or bloody stools; Genito-Urinary: no dysuria, trouble voiding, or hematuria; Musculoskeletal: negative  Neurological: no TIA or stroke symptoms    PHYSICAL EXAM:   Right Arm BP - Sittin/76 (07/10/17 1551)  Left Arm BP - Sittin/71 (07/10/17 1551)  Pulse: 104  Temp: 98.2 °F (36.8 °C)      General appearance:  Alert, well-appearing, and in no distress.  Oriented to person, place, and time   Neurological: Normal speech, no focal findings noted; CN II - XII grossly intact           Musculoskeletal: Digits/nail without cyanosis/clubbing.  Normal muscle strength/tone.                 Neck: Supple, no significant adenopathy; thyroid is not enlarged                  No carotid bruit can be auscultated                Chest:  Clear to auscultation, no wheezes, rales or rhonchi, symmetric air entry     No use of accessory muscles             Cardiac: Normal rate and regular rhythm, S1 and S2 normal; PMI non-displaced          Abdomen: Soft, nontender, nondistended, no masses or organomegaly     No rebound tenderness noted; bowel sounds normal     Pulsatile aortic mass is not palpable.     No groin adenopathy      Extremities:   2+ femoral pulses bilaterally     2+ Popliteal pulses; 2+ pedal pulses palpable.     No ulcerations     R leg : no gross signs of inflammation    LAB RESULTS:  Lab Results   Component Value Date    K 3.6 2017    K 3.6 2017    K 3.4 (L) 2017    CREATININE 0.6 2017    CREATININE 0.7 2017    CREATININE 0.7 2017      Lab Results   Component Value Date    WBC 7.83 07/18/2017    WBC 7.72 07/18/2017    WBC 8.20 07/17/2017    HCT 33.1 (L) 07/18/2017    HCT 33.6 (L) 07/18/2017    HCT 34.3 (L) 07/17/2017     07/18/2017     07/18/2017     07/17/2017     Lab Results   Component Value Date    HGBA1C 5.5 01/17/2017    HGBA1C 5.6 07/16/2010     IMAGING:  Urgent duplex obtained in vascular lab 6/2017: occlusive thrombus throughout entire course of R GSV extending to saphenofemoral junction. No DVT.      Today (7/10): Right Leg: Color flow evaluation of the lower extremity demonstrates sub-acute superficial thrombus of the GSV from the SFJ to below the knee. All the deep vessels are compressible and free of thrombus.      IMP/PLAN:  66 y.o. female with a history of symptoms as noted above with thrombophlebitis of the R GSV with extensive clot burden and involvement of the saphenofemoral junction without presence of DVT.  She has now been on ~1 month of systemic anticoagulation with eliquis, with resolution of her symptoms and no propagation of venous thrombosis to her deep system.    1) continue systemic anticoagulation as noted  2) RTC in one month for surveillance RLE venous duplex  3) follow up with Dr. Parr as scheduled  4) will most likely DC anticoagulation in one month if no further VTE events    Som De La Cruz MD  Vascular & Endovascular Surgery

## 2017-07-18 NOTE — CONSULTS
IR consult note    IR consulted for liver biopsy.  Pt with infiltrative process in the left hepatic lobe concerning for cholangiocarcinoma.    Pt on lovenox for pulmonary embolism found on CTA 7/13/17.    Will plan for CT guided liver biopsy.  Lovenox held by primary team.    Yemi Orozco  Radiology PGY-4

## 2017-07-18 NOTE — PROGRESS NOTES
Paged IM 3 to inquire about initiating heparin drip since enoxaparin was given as ordered even with clarification this AM prior to administering.  Called pharmacy to get information on how long after administration of enoxaparin should a heparin drip be initiated and pharmacist stated the half life is 12 hours and instructed me not to start heparin drip at this time and that the MD would have to change the order.  Will page Dr. Rushing and IM 3 again if no return call by shift change.    0702:  Spoke with Dr. Kelly who stated he would confirm with Dr. Rushing with the heparin drip should be started today.  Notified him that pt is NPO and if she is not going to have a procedure today then she should have a diet order in place.

## 2017-07-19 LAB
ALBUMIN SERPL BCP-MCNC: 2.9 G/DL
ALP SERPL-CCNC: 906 U/L
ALT SERPL W/O P-5'-P-CCNC: 403 U/L
ANION GAP SERPL CALC-SCNC: 9 MMOL/L
AST SERPL-CCNC: 255 U/L
BASOPHILS # BLD AUTO: 0.02 K/UL
BASOPHILS NFR BLD: 0.3 %
BILIRUB SERPL-MCNC: 0.6 MG/DL
BUN SERPL-MCNC: 13 MG/DL
CALCIUM SERPL-MCNC: 9.3 MG/DL
CHLORIDE SERPL-SCNC: 107 MMOL/L
CO2 SERPL-SCNC: 23 MMOL/L
CREAT SERPL-MCNC: 0.6 MG/DL
DIFFERENTIAL METHOD: ABNORMAL
EOSINOPHIL # BLD AUTO: 0.2 K/UL
EOSINOPHIL NFR BLD: 2.3 %
ERYTHROCYTE [DISTWIDTH] IN BLOOD BY AUTOMATED COUNT: 14.2 %
EST. GFR  (AFRICAN AMERICAN): >60 ML/MIN/1.73 M^2
EST. GFR  (NON AFRICAN AMERICAN): >60 ML/MIN/1.73 M^2
FACT X PPP CHRO-ACNC: 0.36 IU/ML
FACT X PPP CHRO-ACNC: 0.41 IU/ML
GLUCOSE SERPL-MCNC: 108 MG/DL
HCT VFR BLD AUTO: 34.4 %
HGB BLD-MCNC: 10.9 G/DL
JAK2 P.V617F BLD/T QL: NORMAL
JAK2 V617F MUTATION DETECTION BLD RESULT: NORMAL
LA PPP-IMP: NORMAL
LYMPHOCYTES # BLD AUTO: 1.1 K/UL
LYMPHOCYTES NFR BLD: 15 %
MAGNESIUM SERPL-MCNC: 2.3 MG/DL
MCH RBC QN AUTO: 27.3 PG
MCHC RBC AUTO-ENTMCNC: 31.7 %
MCV RBC AUTO: 86 FL
MONOCYTES # BLD AUTO: 0.7 K/UL
MONOCYTES NFR BLD: 9.7 %
NEUTROPHILS # BLD AUTO: 5.3 K/UL
NEUTROPHILS NFR BLD: 72.3 %
PLATELET # BLD AUTO: 292 K/UL
PMV BLD AUTO: 10 FL
POTASSIUM SERPL-SCNC: 4 MMOL/L
PROT SERPL-MCNC: 6.6 G/DL
RBC # BLD AUTO: 3.99 M/UL
SODIUM SERPL-SCNC: 139 MMOL/L
WBC # BLD AUTO: 7.39 K/UL

## 2017-07-19 PROCEDURE — 88307 TISSUE EXAM BY PATHOLOGIST: CPT | Performed by: PATHOLOGY

## 2017-07-19 PROCEDURE — 25000003 PHARM REV CODE 250: Performed by: STUDENT IN AN ORGANIZED HEALTH CARE EDUCATION/TRAINING PROGRAM

## 2017-07-19 PROCEDURE — 85520 HEPARIN ASSAY: CPT

## 2017-07-19 PROCEDURE — 63600175 PHARM REV CODE 636 W HCPCS: Performed by: HOSPITALIST

## 2017-07-19 PROCEDURE — 83735 ASSAY OF MAGNESIUM: CPT

## 2017-07-19 PROCEDURE — 85025 COMPLETE CBC W/AUTO DIFF WBC: CPT

## 2017-07-19 PROCEDURE — 99232 SBSQ HOSP IP/OBS MODERATE 35: CPT | Mod: GC,,, | Performed by: HOSPITALIST

## 2017-07-19 PROCEDURE — 36415 COLL VENOUS BLD VENIPUNCTURE: CPT

## 2017-07-19 PROCEDURE — 11000001 HC ACUTE MED/SURG PRIVATE ROOM

## 2017-07-19 PROCEDURE — 0FB23ZX EXCISION OF LEFT LOBE LIVER, PERCUTANEOUS APPROACH, DIAGNOSTIC: ICD-10-PCS | Performed by: RADIOLOGY

## 2017-07-19 PROCEDURE — 80053 COMPREHEN METABOLIC PANEL: CPT

## 2017-07-19 PROCEDURE — 85520 HEPARIN ASSAY: CPT | Mod: 91

## 2017-07-19 PROCEDURE — 63600175 PHARM REV CODE 636 W HCPCS: Performed by: STUDENT IN AN ORGANIZED HEALTH CARE EDUCATION/TRAINING PROGRAM

## 2017-07-19 PROCEDURE — 88333 PATH CONSLTJ SURG CYTO XM 1: CPT | Mod: 26,,, | Performed by: PATHOLOGY

## 2017-07-19 PROCEDURE — 88307 TISSUE EXAM BY PATHOLOGIST: CPT | Mod: 26,,, | Performed by: PATHOLOGY

## 2017-07-19 PROCEDURE — 88342 IMHCHEM/IMCYTCHM 1ST ANTB: CPT | Mod: 26,,, | Performed by: PATHOLOGY

## 2017-07-19 PROCEDURE — 88341 IMHCHEM/IMCYTCHM EA ADD ANTB: CPT | Mod: 26,,, | Performed by: PATHOLOGY

## 2017-07-19 PROCEDURE — 25000003 PHARM REV CODE 250: Performed by: HOSPITALIST

## 2017-07-19 RX ORDER — ENOXAPARIN SODIUM 100 MG/ML
1 INJECTION SUBCUTANEOUS ONCE
Status: COMPLETED | OUTPATIENT
Start: 2017-07-19 | End: 2017-07-19

## 2017-07-19 RX ORDER — ENOXAPARIN SODIUM 100 MG/ML
1 INJECTION SUBCUTANEOUS ONCE
Status: DISCONTINUED | OUTPATIENT
Start: 2017-07-19 | End: 2017-07-19

## 2017-07-19 RX ORDER — MIDAZOLAM HYDROCHLORIDE 1 MG/ML
INJECTION, SOLUTION INTRAMUSCULAR; INTRAVENOUS CODE/TRAUMA/SEDATION MEDICATION
Status: COMPLETED | OUTPATIENT
Start: 2017-07-19 | End: 2017-07-19

## 2017-07-19 RX ORDER — FENTANYL CITRATE 50 UG/ML
INJECTION, SOLUTION INTRAMUSCULAR; INTRAVENOUS CODE/TRAUMA/SEDATION MEDICATION
Status: COMPLETED | OUTPATIENT
Start: 2017-07-19 | End: 2017-07-19

## 2017-07-19 RX ADMIN — FENTANYL CITRATE 25 MCG: 50 INJECTION, SOLUTION INTRAMUSCULAR; INTRAVENOUS at 04:07

## 2017-07-19 RX ADMIN — OXYCODONE HYDROCHLORIDE 5 MG: 5 TABLET ORAL at 08:07

## 2017-07-19 RX ADMIN — MIDAZOLAM 1 MG: 1 INJECTION INTRAMUSCULAR; INTRAVENOUS at 04:07

## 2017-07-19 RX ADMIN — ENOXAPARIN SODIUM 60 MG: 100 INJECTION SUBCUTANEOUS at 08:07

## 2017-07-19 RX ADMIN — ACETAMINOPHEN 650 MG: 325 TABLET ORAL at 10:07

## 2017-07-19 NOTE — H&P
Inpatient Radiology Pre-procedure Note    History of Present Illness:  Andra Matamoros is a 66 y.o. female with left hepatic lobe mass concerning for cholangiocarcinoma who presents for Ct guided liver biopsy.  Admission H&P reviewed.  Past Medical History:   Diagnosis Date    Blood clot in vein     right lower extremity     Past Surgical History:   Procedure Laterality Date     SECTION      CHOLECYSTECTOMY      TONSILLECTOMY         Review of Systems:   As documented in primary team H&P    Home Meds:   Prior to Admission medications    Medication Sig Start Date End Date Taking? Authorizing Provider   acetaminophen (TYLENOL) 500 MG tablet Take 500 mg by mouth nightly as needed for Pain.   Yes Historical Provider, MD   enoxaparin (LOVENOX) 100 mg/mL Syrg Inject 0.9 mLs (90 mg total) into the skin once daily at 6am. 17  Yes Yodit Rodriguez NP     Scheduled Meds:    Continuous Infusions:    heparin (porcine) in D5W 17 Units/kg/hr (17)     PRN Meds:acetaminophen, benzonatate, dextrose 50%, dextrose 50%, glucagon (human recombinant), glucose, glucose, oxycodone, oxycodone, ramelteon  Anticoagulants/Antiplatelets: lovenox, last dose yesterday at 5AM, heparin ggt d/c'ed this AM at 6AM    Allergies: Review of patient's allergies indicates:  No Known Allergies  Sedation Hx: have not been any systemic reactions    Labs:    Recent Labs  Lab 17  0357   INR 0.9       Recent Labs  Lab 17  0449   WBC 7.39   HGB 10.9*   HCT 34.4*   MCV 86         Recent Labs  Lab 17  2035  17  0449   *  < > 108     < > 139   K 3.4*  < > 4.0     < > 107   CO2 25  < > 23   BUN 18  < > 13   CREATININE 0.7  < > 0.6   CALCIUM 9.4  < > 9.3   MG 1.9  < > 2.3   *  < > 403*   *  < > 255*   ALBUMIN 3.3*  < > 2.9*   BILITOT 0.8  < > 0.6   BILIDIR 0.5*  --   --    < > = values in this interval not displayed.      Vitals:  Temp: 98.5 °F (36.9 °C) (17  1211)  Pulse: 82 (07/19/17 1211)  Resp: 18 (07/19/17 1211)  BP: (!) 113/57 (07/19/17 1211)  SpO2: 98 % (07/19/17 1211)     Physical Exam:  ASA: 3  Mallampati: 2    General: no acute distress  Mental Status: alert and oriented to person, place and time  HEENT: normocephalic, atraumatic  Chest: unlabored breathing  Heart: regular heart rate  Abdomen: nondistended  Extremity: moves all extremities    Plan: CT guided biopsy of left hepatic lobe mass  Sedation Plan: moderate     Yemi Orozco  Radiology PGY-4

## 2017-07-19 NOTE — PROGRESS NOTES
Pt arrives to [facility] bay #1. Patient awake, alert and oriented resting quietly in stretcher.  Respirations even and unlabored, no apparent distress.  Stretcher locked, in lowest position, side rail up.  Patient on continuous monitoring.  Family updated and at bedside.  Denies needs at this time.  Will continue to monitor.

## 2017-07-19 NOTE — PROGRESS NOTES
Discussed with IR staff regarding resuming anticoagulation on patient. Staff noted that it would take around 3 hours to safely resume anticoagulation. Talked to nurse about this. Will give patient Lovenox injection 60 mg at 9:00 pm, approximately 3 hours after her procedure. Will plan for discharge tomorrow and send patient home on her home dose of Lovenox injection 90 mg once daily.

## 2017-07-19 NOTE — PROGRESS NOTES
Pt arrived to IR CT room 173 for liver biopsy. Pt awake, alert, and oriented. Dr. Orozco currently at bedside for consent.

## 2017-07-19 NOTE — NURSING
Patient remained stable over night no acute events. AOx4, VSS, no c/o pain, currently on hep drip at 17 units, remained therapeutic overnight, next anti xa scheduled for tomorrow am, patient NPO for liver biopsy, will continue to monitor patient.

## 2017-07-19 NOTE — PROGRESS NOTES
Report called to AMELIA Ma.  Anil Aware to hold anticoag meds for 3 hours post procedure per Dr. Carias

## 2017-07-19 NOTE — PLAN OF CARE
07/18/17 1135   Discharge Assessment   Assessment Type Discharge Planning Assessment   Confirmed/corrected address and phone number on facesheet? Yes   Assessment information obtained from? Patient;Caregiver;Medical Record  (daughter and  at bedside)   Expected Length of Stay (days) 1   Communicated expected length of stay with patient/caregiver yes   Prior to hospitilization cognitive status: Alert/Oriented   Prior to hospitalization functional status: Independent   Current cognitive status: Alert/Oriented   Current Functional Status: Independent   Arrived From home or self-care   Lives With spouse   Able to Return to Prior Arrangements yes   Is patient able to care for self after discharge? Yes   How many people do you have in your home that can help with your care after discharge? 1   Patient's perception of discharge disposition home or selfcare   Readmission Within The Last 30 Days current reason for admission unrelated to previous admission   Patient currently being followed by outpatient case management? No   Patient currently receives home health services? No   Does the patient currently use HME? No   Patient currently receives private duty nursing? No   Patient currently receives any other outside agency services? No   Equipment Currently Used at Home none   Do you have any problems affording any of your prescribed medications? No   Is the patient taking medications as prescribed? yes   Do you have any financial concerns preventing you from receiving the healthcare you need? No   Does the patient have transportation to healthcare appointments? Yes   Transportation Available car   On Dialysis? No   Does the patient receive services at the Coumadin Clinic? No   Are there any open cases? No   Discharge Plan A Home with family   Patient/Family In Agreement With Plan yes

## 2017-07-19 NOTE — PROGRESS NOTES
"Progress Note  Hospital Medicine    Provider team: [facility] 3  Admit Date: 7/17/2017  Encounter Date: 07/19/2017     SUBJECTIVE:     Follow-up Visit for: Liver mass, left lobe    HPI/(See H&P for complete P,F,SHx):    Patient is a 66 y.o. female presents with weakness and fatigue. Onset of symptoms was gradual starting 7 days ago with unchanged course since that time. Patient denies chest pain, shortness of breath, fever, leg pain/swelling/redness. Symptoms are aggravated by PO intake. Symptoms improve with decreasing PO intake. Patient states that she was recently admitted to [facility] for NSTEMI and PE. Patient was discharged on lovenox 90mg daily. Cardiac echo was found to be benign with normal EF. Patient also has a history of a liver mass for which she is followed by [facility] hepatology. Imaging regarding liver mass was concerning for cholangiocarcinoma.       Review of Systems:  Review of Systems   Constitutional: Negative for chills.   HENT: Negative for sore throat.    Eyes: Negative for blurred vision and double vision.   Respiratory: Negative for cough, sputum production and shortness of breath.    Cardiovascular: Negative for chest pain and palpitations.   Gastrointestinal: Negative for abdominal pain, nausea and vomiting.   Genitourinary: Negative for dysuria.   Neurological: Negative for dizziness, focal weakness, weakness and headaches.       OBJECTIVE:   No intake or output data in the 24 hours ending 07/19/17 1102  Vital Signs Range (Last 24H):  Temp:  [98 °F (36.7 °C)-98.7 °F (37.1 °C)]   Pulse:  []   Resp:  [18]   BP: (105-123)/(53-68)   SpO2:  [95 %-100 %]   Body mass index is 22.13 kg/m².    Objective:  General Appearance:  Comfortable.    Vital signs: (most recent): Blood pressure 105/65, pulse 79, temperature 98.2 °F (36.8 °C), temperature source Oral, resp. rate 18, height 5' 4.17" (1.63 m), weight 58.8 kg (129 lb 10.1 oz), SpO2 97 %, not currently breastfeeding.    Output: Producing " urine and producing stool.    HEENT: Normal HEENT exam.    Lungs:  Normal respiratory rate.  Breath sounds clear to auscultation.  She is not in respiratory distress.  No wheezes.    Heart: Normal rate.  Regular rhythm.  S1 normal and S2 normal.  No murmur, gallop or friction rub.   Abdomen: Abdomen is soft and non-distended.  Bowel sounds are normal.   There is no abdominal tenderness.     Pulses: Distal pulses are intact.    Neurological: Patient is alert and oriented to person, place and time.    Pupils:  Pupils are equal, round, and reactive to light.          Medications:  Medication list was reviewed in EPIC and changes noted under Assessment/Plan and MAR.    Laboratory:  Recent Labs      07/19/17   0449   WBC  7.39   RBC  3.99*   HGB  10.9*   HCT  34.4*   PLT  292   MCV  86   MCH  27.3   MCHC  31.7*   GRAN  72.3  5.3   LYMPH  15.0*  1.1   MONO  9.7  0.7   EOS  0.2      Recent Labs      07/17/17 2035 07/19/17   0449   GLU  118*   < >  108   NA  139   < >  139   K  3.4*   < >  4.0   CL  104   < >  107   CO2  25   < >  23   BUN  18   < >  13   CREATININE  0.7   < >  0.6   CALCIUM  9.4   < >  9.3   ANIONGAP  10   < >  9   MG  1.9   < >  2.3   PHOS  3.1   --    --     < > = values in this interval not displayed.       ASSESSMENT/PLAN:     Active Hospital Problems    Diagnosis  POA    *Liver mass, left lobe [R16.0]  Yes    Acute pulmonary embolism bilateral [I26.99]  Yes    Elevated liver enzymes [R74.8]  Yes      Resolved Hospital Problems    Diagnosis Date Resolved POA   No resolved problems to display.        Acute pulmonary embolism bilateral     -Stopped lovenox and bridged to heparin gtt  -Will start back on lovenox after procedure  -PT/INR daily- yesterday was .9   -Anti Hep- .36   -Will continue to follow          * Liver mass     -Had outpatient biopsy scheduled, but contacted clinic due to concern of delay of biopsy and recommended by Hepatology to speed up biopsy  -Negative biopsy 03/2017  -CT  Abd/Pelvis 06/2017 with enlarged liver, hypodense area involving left hepatic lobe which was new from 03/2017. No ductal dilation noted.   -IR consulted for biopsy evaluation, will perform biopsy today 3/19/17  -Patient is NPO, will resume lovenox post-procedure         Anticipated discharge date and disposition:   Possible discharge today after procedure  Hai Fulton MD  [facility]

## 2017-07-20 VITALS
BODY MASS INDEX: 22.13 KG/M2 | TEMPERATURE: 98 F | SYSTOLIC BLOOD PRESSURE: 118 MMHG | OXYGEN SATURATION: 97 % | HEIGHT: 64 IN | RESPIRATION RATE: 18 BRPM | HEART RATE: 78 BPM | WEIGHT: 129.63 LBS | DIASTOLIC BLOOD PRESSURE: 61 MMHG

## 2017-07-20 LAB
ALBUMIN SERPL BCP-MCNC: 2.8 G/DL
ALP SERPL-CCNC: 901 U/L
ALT SERPL W/O P-5'-P-CCNC: 320 U/L
ANION GAP SERPL CALC-SCNC: 8 MMOL/L
APTT PROTEIN C ACTIVATOR+FV DP/APTT PPP: 88 %
AST SERPL-CCNC: 178 U/L
BASOPHILS # BLD AUTO: 0.01 K/UL
BASOPHILS NFR BLD: 0.1 %
BILIRUB SERPL-MCNC: 0.5 MG/DL
BUN SERPL-MCNC: 17 MG/DL
CALCIUM SERPL-MCNC: 9 MG/DL
CHLORIDE SERPL-SCNC: 105 MMOL/L
CO2 SERPL-SCNC: 25 MMOL/L
CREAT SERPL-MCNC: 0.7 MG/DL
DIFFERENTIAL METHOD: ABNORMAL
EOSINOPHIL # BLD AUTO: 0.2 K/UL
EOSINOPHIL NFR BLD: 1.9 %
ERYTHROCYTE [DISTWIDTH] IN BLOOD BY AUTOMATED COUNT: 14.4 %
EST. GFR  (AFRICAN AMERICAN): >60 ML/MIN/1.73 M^2
EST. GFR  (NON AFRICAN AMERICAN): >60 ML/MIN/1.73 M^2
GLUCOSE SERPL-MCNC: 101 MG/DL
HCT VFR BLD AUTO: 32.2 %
HGB BLD-MCNC: 10.5 G/DL
LYMPHOCYTES # BLD AUTO: 0.9 K/UL
LYMPHOCYTES NFR BLD: 11.7 %
MAGNESIUM SERPL-MCNC: 1.9 MG/DL
MCH RBC QN AUTO: 27.9 PG
MCHC RBC AUTO-ENTMCNC: 32.6 G/DL
MCV RBC AUTO: 85 FL
MONOCYTES # BLD AUTO: 0.9 K/UL
MONOCYTES NFR BLD: 11.7 %
NEUTROPHILS # BLD AUTO: 5.8 K/UL
NEUTROPHILS NFR BLD: 74.1 %
PLATELET # BLD AUTO: 292 K/UL
PMV BLD AUTO: 9.8 FL
POTASSIUM SERPL-SCNC: 3.9 MMOL/L
PROT SERPL-MCNC: 6.4 G/DL
RBC # BLD AUTO: 3.77 M/UL
SODIUM SERPL-SCNC: 138 MMOL/L
WBC # BLD AUTO: 7.83 K/UL

## 2017-07-20 PROCEDURE — 36415 COLL VENOUS BLD VENIPUNCTURE: CPT

## 2017-07-20 PROCEDURE — 83735 ASSAY OF MAGNESIUM: CPT

## 2017-07-20 PROCEDURE — 63600175 PHARM REV CODE 636 W HCPCS: Performed by: STUDENT IN AN ORGANIZED HEALTH CARE EDUCATION/TRAINING PROGRAM

## 2017-07-20 PROCEDURE — 80053 COMPREHEN METABOLIC PANEL: CPT

## 2017-07-20 PROCEDURE — 85025 COMPLETE CBC W/AUTO DIFF WBC: CPT

## 2017-07-20 PROCEDURE — 25000003 PHARM REV CODE 250: Performed by: HOSPITALIST

## 2017-07-20 PROCEDURE — 99238 HOSP IP/OBS DSCHRG MGMT 30/<: CPT | Mod: GC,,, | Performed by: HOSPITALIST

## 2017-07-20 RX ORDER — ENOXAPARIN SODIUM 100 MG/ML
90 INJECTION SUBCUTANEOUS ONCE
Status: COMPLETED | OUTPATIENT
Start: 2017-07-20 | End: 2017-07-20

## 2017-07-20 RX ADMIN — ACETAMINOPHEN 650 MG: 325 TABLET ORAL at 10:07

## 2017-07-20 RX ADMIN — ACETAMINOPHEN 650 MG: 325 TABLET ORAL at 02:07

## 2017-07-20 RX ADMIN — ENOXAPARIN SODIUM 90 MG: 100 INJECTION SUBCUTANEOUS at 09:07

## 2017-07-20 NOTE — PROGRESS NOTES
Patient requested to be evaluated by MD with complaint of new onset right arm pain. She states she is concerned because the pain feels similar to her left arm pain when she had a PE previously. She describes the pain as a soreness, 8/10, localized, non-radiating pain located over her right bicep. No alleviating or aggravating factors. No other associated symptoms. She states the pain resolved on its own. She denies chest pain, shortness of breath or any changes in breathing. Her vitals are stable - pulse checked manually was 88, sat 100% on room air. On physical exam, she appears to be resting comfortably in bed, in no acute distress. Her lungs are clear to auscultation bilaterally. No tenderness to palpation of the right upper extremity. Clinical suspicion very low for PE at this time. She is currently being transitioned from heparin drip to lovenox. Will continue to monitor.    Bin Mabry MD  PGY1

## 2017-07-20 NOTE — PROGRESS NOTES
Progress Note  Hospital Medicine     Provider team: [facility] 3  Admit Date: 7/17/2017  Encounter Date: 07/20/2017      SUBJECTIVE:      Follow-up Visit for: Liver mass, left lobe     HPI/(See H&P for complete P,F,SHx):    Patient is a 66 y.o. female presents with weakness and fatigue. Onset of symptoms was gradual starting 7 days ago with unchanged course since that time. Patient denies chest pain, shortness of breath, fever, leg pain/swelling/redness. Symptoms are aggravated by PO intake. Symptoms improve with decreasing PO intake. Patient states that she was recently admitted to [facility] for NSTEMI and PE. Patient was discharged on lovenox 90mg daily. Cardiac echo was found to be benign with normal EF. Patient also has a history of a liver mass for which she is followed by [facility] hepatology. Imaging regarding liver mass was concerning for cholangiocarcinoma.     Hospital Course:  7/19/17 - NAEON, patient kept NPO and made ready for liver biopsy    Interval history  NAEON, patient slept well and tolerated biopsy well, continued anticoagulation this am     Review of Systems:  Review of Systems   Constitutional: Negative for chills.   HENT: Negative for sore throat.    Eyes: Negative for blurred vision and double vision.   Respiratory: Negative for cough, sputum production and shortness of breath.    Cardiovascular: Negative for chest pain and palpitations.   Gastrointestinal: Negative for abdominal pain, nausea and vomiting.   Genitourinary: Negative for dysuria.   Neurological: Negative for dizziness, focal weakness, weakness and headaches.         OBJECTIVE:   No intake or output data in the 24 hours ending 07/19/17 1102  Vital Signs Range (Last 24H):  Temp:  [98 °F (36.7 °C)-98.7 °F (37.1 °C)]   Pulse:  []   Resp:  [18]   BP: (105-123)/(53-68)   SpO2:  [95 %-100 %]   Body mass index is 22.13 kg/m².     Objective:  General Appearance:  Comfortable.    Vital signs: (most recent): Blood pressure 105/65,  "pulse 79, temperature 98.2 °F (36.8 °C), temperature source Oral, resp. rate 18, height 5' 4.17" (1.63 m), weight 58.8 kg (129 lb 10.1 oz), SpO2 97 %, not currently breastfeeding.    Output: Producing urine and producing stool.    HEENT: Normal HEENT exam.    Lungs:  Normal respiratory rate.  Breath sounds clear to auscultation.  She is not in respiratory distress.  No wheezes.    Heart: Normal rate.  Regular rhythm.  S1 normal and S2 normal.  No murmur, gallop or friction rub.   Abdomen: Abdomen is soft and non-distended.  Bowel sounds are normal.   There is no abdominal tenderness.     Pulses: Distal pulses are intact.    Neurological: Patient is alert and oriented to person, place and time.    Pupils:  Pupils are equal, round, and reactive to light.             Medications:  Medication list was reviewed in EPIC and changes noted under Assessment/Plan and MAR.     Laboratory:  Recent Labs      07/19/17   0449   WBC  7.39   RBC  3.99*   HGB  10.9*   HCT  34.4*   PLT  292   MCV  86   MCH  27.3   MCHC  31.7*   GRAN  72.3  5.3   LYMPH  15.0*  1.1   MONO  9.7  0.7   EOS  0.2            Recent Labs      07/17/17 2035    07/19/17   0449   GLU  118*   < >  108   NA  139   < >  139   K  3.4*   < >  4.0   CL  104   < >  107   CO2  25   < >  23   BUN  18   < >  13   CREATININE  0.7   < >  0.6   CALCIUM  9.4   < >  9.3   ANIONGAP  10   < >  9   MG  1.9   < >  2.3   PHOS  3.1   --    --     < > = values in this interval not displayed.         ASSESSMENT/PLAN:            Active Hospital Problems     Diagnosis   POA    *Liver mass, left lobe [R16.0]   Yes    Acute pulmonary embolism bilateral [I26.99]   Yes    Elevated liver enzymes [R74.8]   Yes       Resolved Hospital Problems     Diagnosis Date Resolved POA   No resolved problems to display.             Acute pulmonary embolism bilateral     -Stopped lovenox and bridged to heparin gtt  -Approx 3 hours after procedure gave Lovenox 60 mg injection  -INR yesterday was.9 "   -Anti Hep- .36   -Patient given home dose of lovenox 90 mg injection this am  -She will continue once daily lovenox 90 mg injection starting tomorrow          * Liver mass     -Had outpatient biopsy scheduled, but contacted clinic due to concern of delay of biopsy and recommended by Hepatology to speed up biopsy  -Negative biopsy 03/2017  -CT Abd/Pelvis 06/2017 with enlarged liver, hypodense area involving left hepatic lobe which was new from 03/2017. No ductal dilation noted.   -IR consulted for biopsy evaluation, will perform biopsy today 3/19/17  -Has a follow up appointment to discuss results of biopsy            Anticipated discharge date and disposition:   Will be discharged today  Hai Fulton MD  [facility] Medicine

## 2017-07-20 NOTE — PLAN OF CARE
Problem: Patient Care Overview  Goal: Plan of Care Review  Pt free of falls/injuries throughout shift. Pt up ad michelle, continent, and able to turn herself in bedPt had c/o pain in R arm in beginning of shift. No SOB, no chest tightness, no swelling or redness noted to arm. Pt wanted to see MD for second opinion. IM Team 3 came to room and stated that everything seemed fine. No acute events over night. Pain relieved by PRN Oxycodone. VSS. No s/s distress noted. Bed in low-locked position. Call light in reach. Will continue to monitor pt

## 2017-07-22 NOTE — ASSESSMENT & PLAN NOTE
-Had outpatient biopsy scheduled, but contacted clinic due to concern of delay of biopsy and recommended by Hepatology to speed up biopsy  -Negative biopsy 03/2017  -CT Abd/Pelvis 06/2017 with enlarged liver, hypodense area involving left hepatic lobe which was new from 03/2017. No ductal dilation noted.   -IR consulted for biopsy evaluation, done inpatient

## 2017-07-22 NOTE — ASSESSMENT & PLAN NOTE
-Bridged to heparin gtt for biopsy, restarted home lovenox before discharge  -PT/INR daily  -Will continue to follow

## 2017-07-22 NOTE — HOSPITAL COURSE
Admitted to hospital hemodynamically stable. Bridged to heparin gtt. Underwent liver biopsy with IR. Restarted home lovenox and discharged home. Had Hepatology followup already set up.

## 2017-07-22 NOTE — DISCHARGE SUMMARY
[facility] Hospital Medicine  Discharge Summary      Patient Name: Andra Matamoros  MRN: [id number]  Admission Date: 2017  Hospital Length of Stay: 3 days  Discharge Date and Time: 2017 12:39 PM  Attending Physician: Dr. Ross Marte  Discharging Provider: Vinicio Rushing IV, MD  Primary Care Provider: Wesly Parr MD  Hospital Medicine Team: [facility] – [place], [place] HOSP MED 3 Vinicio Rushing IV, MD    HPI:   [facility]  History & Physical    SUBJECTIVE:     Chief Complaint/Reason for Admission: Weakness and fatigue    History of Present Illness:  Patient is a 66 y.o. female presents with weakness and fatigue. Onset of symptoms was gradual starting 7 days ago with unchanged course since that time. Patient denies chest pain, shortness of breath, fever, leg pain/swelling/redness. Symptoms are aggravated by PO intake. Symptoms improve with decreasing PO intake. Patient states that she was recently admitted to [facility] for NSTEMI and PE. Patient was discharged on lovenox 90mg daily. Cardiac echo was found to be benign with normal EF. Patient also has a history of a liver mass for which she is followed by [facility] hepatology. Imaging regarding liver mass was concerning for cholangiocarcinoma.     PTA Medications   Medication Sig    enoxaparin (LOVENOX) 100 mg/mL Syrg Inject 0.9 mLs (90 mg total) into the skin once daily at 6am.    Lactobacillus rhamnosus GG (CULTURELLE) 10 billion cell capsule Take 1 capsule by mouth 2 (two) times daily.    alprazolam (XANAX) 0.25 MG tablet Take 1 tablet (0.25 mg total) by mouth 2 (two) times daily as needed for Anxiety (mri).       Review of patient's allergies indicates:  No Known Allergies    Past Medical History:   Diagnosis Date    Blood clot in vein     right lower extremity     Past Surgical History:   Procedure Laterality Date     SECTION      CHOLECYSTECTOMY      TONSILLECTOMY       Family History   Problem Relation Age of Onset     Breast cancer Sister 60    Cancer Father     Vasculitis Mother     Paget's disease of bone Mother     No Known Problems Brother     No Known Problems Daughter     No Known Problems Son     Colon cancer Neg Hx     Ovarian cancer Neg Hx      Social History   Substance Use Topics    Smoking status: Never Smoker    Smokeless tobacco: Never Used    Alcohol use Yes      Comment: Grand Nguyen periodically        Review of Systems:  Eyes: no visual changes  Respiratory: positive for cough, negative for pleurisy/chest pain  Cardiovascular: no chest pain or palpitations  Gastrointestinal: no nausea or vomiting, no abdominal pain or change in bowel habits  Hematologic/Lymphatic: no easy bruising or lymphadenopathy  Musculoskeletal: no arthralgias or myalgias  Neurological: no seizures or tremors    OBJECTIVE:     Vital Signs (Most Recent):  Temp: 97.8 °F (36.6 °C) (07/17/17 2015)  Pulse: 97 (07/17/17 2015)  Resp: 20 (07/17/17 2015)  BP: (!) 112/59 (07/17/17 2015)  SpO2: 95 % (07/17/17 2015)    Physical Exam:  General: well developed, well nourished  Lungs:  clear to auscultation bilaterally and normal respiratory effort  Cardiovascular: Heart: regular rate and rhythm, S1, S2 normal, no murmur, click, rub or gallop. Chest Wall: no tenderness. Extremities: no cyanosis or edema, or clubbing. Pulses: 2+ and symmetric.  Abdomen/Rectal: Abdomen: soft, non-tender non-distented; bowel sounds normal; no masses,  no organomegaly. Rectal: normal tone, no masses or tenderness  Skin: Skin color, texture, turgor normal. No rashes or lesions  Musculoskeletal:no clubbing, cyanosis    Laboratory:  CBC:   Recent Labs  Lab 07/17/17 2035   WBC 8.20   RBC 4.01   HGB 11.2*   HCT 34.3*      MCV 86   MCH 27.9   MCHC 32.7     CMP:   Recent Labs  Lab 07/17/17 2035   *   CALCIUM 9.4   ALBUMIN 3.3*   PROT 7.4      K 3.4*   CO2 25      BUN 18   CREATININE 0.7   ALKPHOS 957*   *   *   BILITOT 0.8      LFTs:   Recent Labs  Lab 07/17/17 2035   *   *   ALKPHOS 957*   BILITOT 0.8   PROT 7.4   ALBUMIN 3.3*     Coagulation:   Recent Labs  Lab 07/17/17 2035   LABPROT 10.3   INR 1.0   APTT 23.0       Diagnostic Results:  Labs: Reviewed  as of 2155    ASSESSMENT/PLAN:   Patient is a 66 y.o. female presents with weakness and fatigue.    Weakness and fatigue:  -CBC ordered, no clinical signs of anemia, will continue to follow  -Afebrile, VSS    PE:  -Continue home dose lovenox  -PT/INR ordered  -No clinical signs of DVT at time of exam    Liver mass:  -Followed by [facility] hepatology, biopsy scheduled  -CBC/PT/INR/CMP ordered, will follow    * No surgery found *      Indwelling Lines/Drains at time of discharge:   Lines/Drains/Airways          No matching active lines, drains, or airways        Hospital Course:   Admitted to hospital hemodynamically stable. Bridged to heparin gtt. Underwent liver biopsy with IR. Restarted home lovenox and discharged home. Had Hepatology followup already set up.      Consults:   Consults         Status Ordering Provider     Inpatient consult to Interventional Radiology  Once     Provider:  (Not yet assigned)    Completed BENI PINEDA IV          Significant Diagnostic Studies: Labs: CMP No results for input(s): NA, K, CL, CO2, GLU, BUN, CREATININE, CALCIUM, PROT, ALBUMIN, BILITOT, ALKPHOS, AST, ALT, ANIONGAP, ESTGFRAFRICA, EGFRNONAA in the last 48 hours. and CBC No results for input(s): WBC, HGB, HCT, PLT in the last 48 hours.    Pending Diagnostic Studies:     Procedure Component Value Units Date/Time    Cytology Specimen-FNA-Radiology Assisted with Path Adequacy-Core BX [[id number]] Collected:  07/19/17 1631    Order Status:  Sent Lab Status:  In process Updated:  07/19/17 1709    Specimen:  Liver (radiology assisted with on site path adequacy)         Final Active Diagnoses:    Diagnosis Date Noted POA    PRINCIPAL PROBLEM:  Liver mass, left lobe [R16.0] 07/17/2017  Yes    Acute pulmonary embolism bilateral [I26.99] 07/13/2017 Yes    Elevated liver enzymes [R74.8] 03/31/2017 Yes      Problems Resolved During this Admission:    Diagnosis Date Noted Date Resolved POA      * Liver mass, left lobe    -Had outpatient biopsy scheduled, but contacted clinic due to concern of delay of biopsy and recommended by Hepatology to speed up biopsy  -Negative biopsy 03/2017  -CT Abd/Pelvis 06/2017 with enlarged liver, hypodense area involving left hepatic lobe which was new from 03/2017. No ductal dilation noted.   -IR consulted for biopsy evaluation, done inpatient        Acute pulmonary embolism bilateral    -Bridged to heparin gtt for biopsy, restarted home lovenox before discharge  -PT/INR daily  -Will continue to follow          Elevated liver enzymes    -Chronic issue, concern for underlying malignancy, see mass              Discharged Condition: good    Disposition: Home or Self Care    Follow Up:  Follow-up Information     Dwayne Sanches MD On 7/27/2017.    Specialties:  Hepatology, Gastroenterology, Transplant  Why:  at scheduled appointment for liver lesion  Contact information:  [address] [facility] [address]  [phone]                 Patient Instructions:     Diet general     Call MD for:  increased confusion or weakness     Call MD for:  persistent dizziness, light-headedness, or visual disturbances     Call MD for:  worsening rash     Call MD for:  severe persistent headache     Call MD for:  difficulty breathing or increased cough     Call MD for:  redness, tenderness, or signs of infection (pain, swelling, redness, odor or green/yellow discharge around incision site)     Call MD for:  severe uncontrolled pain     Call MD for:  persistent nausea and vomiting or diarrhea     Call MD for:  temperature >100.4       Medications:  Reconciled Home Medications:   Discharge Medication List as of 7/20/2017 11:43 AM      CONTINUE these medications which have NOT CHANGED     Details   acetaminophen (TYLENOL) 500 MG tablet Take 500 mg by mouth nightly as needed for Pain., Historical Med      enoxaparin (LOVENOX) 100 mg/mL Syrg Inject 0.9 mLs (90 mg total) into the skin once daily at 6am., Starting Fri 7/14/2017, Normal           Time spent on the discharge of patient: 10 minutes    Vinicio Rushing IV, MD  Department of Hospital Medicine  [facility]

## 2017-07-25 ENCOUNTER — TELEPHONE (OUTPATIENT)
Dept: HEPATOLOGY | Facility: HOSPITAL | Age: 67
End: 2017-07-25

## 2017-07-25 ENCOUNTER — PATIENT MESSAGE (OUTPATIENT)
Dept: HEPATOLOGY | Facility: CLINIC | Age: 67
End: 2017-07-25

## 2017-07-25 DIAGNOSIS — C80.1 ADENOCARCINOMA OF UNKNOWN ORIGIN: Primary | ICD-10-CM

## 2017-07-25 NOTE — PLAN OF CARE
7/25/17 4:54pm - message sent to Jeramie Amaya RN, transition navigator for oncology requesting follow up.

## 2017-07-27 ENCOUNTER — OFFICE VISIT (OUTPATIENT)
Dept: HEPATOLOGY | Facility: CLINIC | Age: 67
End: 2017-07-27
Attending: INTERNAL MEDICINE
Payer: MEDICARE

## 2017-07-27 ENCOUNTER — LAB VISIT (OUTPATIENT)
Dept: LAB | Facility: HOSPITAL | Age: 67
End: 2017-07-27
Attending: INTERNAL MEDICINE
Payer: MEDICARE

## 2017-07-27 ENCOUNTER — TELEPHONE (OUTPATIENT)
Dept: HEMATOLOGY/ONCOLOGY | Facility: CLINIC | Age: 67
End: 2017-07-27

## 2017-07-27 ENCOUNTER — PATIENT MESSAGE (OUTPATIENT)
Dept: HEPATOLOGY | Facility: CLINIC | Age: 67
End: 2017-07-27

## 2017-07-27 ENCOUNTER — TELEPHONE (OUTPATIENT)
Dept: ENDOSCOPY | Facility: HOSPITAL | Age: 67
End: 2017-07-27

## 2017-07-27 VITALS
BODY MASS INDEX: 22.4 KG/M2 | SYSTOLIC BLOOD PRESSURE: 112 MMHG | HEIGHT: 64 IN | OXYGEN SATURATION: 100 % | HEART RATE: 107 BPM | DIASTOLIC BLOOD PRESSURE: 61 MMHG | WEIGHT: 131.19 LBS | RESPIRATION RATE: 16 BRPM

## 2017-07-27 DIAGNOSIS — R10.11 RIGHT UPPER QUADRANT ABDOMINAL PAIN: ICD-10-CM

## 2017-07-27 DIAGNOSIS — R74.8 ELEVATED LIVER ENZYMES: ICD-10-CM

## 2017-07-27 DIAGNOSIS — R16.0 LIVER MASS: Primary | ICD-10-CM

## 2017-07-27 DIAGNOSIS — R74.8 ELEVATED ALKALINE PHOSPHATASE LEVEL: ICD-10-CM

## 2017-07-27 DIAGNOSIS — R16.0 LIVER MASS: ICD-10-CM

## 2017-07-27 DIAGNOSIS — R16.0 LIVER MASS, LEFT LOBE: ICD-10-CM

## 2017-07-27 LAB
AMYLASE SERPL-CCNC: 43 U/L
CANCER AG19-9 SERPL-ACNC: ABNORMAL U/ML
CEA SERPL-MCNC: 15.9 NG/ML
LIPASE SERPL-CCNC: 33 U/L

## 2017-07-27 PROCEDURE — 82150 ASSAY OF AMYLASE: CPT

## 2017-07-27 PROCEDURE — 86301 IMMUNOASSAY TUMOR CA 19-9: CPT

## 2017-07-27 PROCEDURE — 83690 ASSAY OF LIPASE: CPT

## 2017-07-27 PROCEDURE — 82378 CARCINOEMBRYONIC ANTIGEN: CPT

## 2017-07-27 PROCEDURE — 36415 COLL VENOUS BLD VENIPUNCTURE: CPT

## 2017-07-27 PROCEDURE — 99214 OFFICE O/P EST MOD 30 MIN: CPT | Mod: S$GLB,,, | Performed by: INTERNAL MEDICINE

## 2017-07-27 PROCEDURE — 99999 PR PBB SHADOW E&M-EST. PATIENT-LVL III: CPT | Mod: PBBFAC,,, | Performed by: INTERNAL MEDICINE

## 2017-07-27 RX ORDER — OXYCODONE AND ACETAMINOPHEN 5; 325 MG/1; MG/1
1 TABLET ORAL EVERY 8 HOURS PRN
Status: DISCONTINUED | OUTPATIENT
Start: 2017-07-27 | End: 2017-07-27

## 2017-07-27 RX ORDER — OXYCODONE AND ACETAMINOPHEN 10; 325 MG/1; MG/1
1 TABLET ORAL EVERY 8 HOURS PRN
Qty: 30 TABLET | Refills: 0 | Status: SHIPPED | OUTPATIENT
Start: 2017-07-27 | End: 2017-08-01 | Stop reason: ALTCHOICE

## 2017-07-27 NOTE — TELEPHONE ENCOUNTER
Dr. De La Cruz,    Pt is currently taking Lovenox. Pt needs to be scheduled for colonoscopy. Per endoscopy protocol lovenox to be held x 24 hr prior to procedure. Please advise and message back  For documentation so Pt can be scheduled.     Thank you

## 2017-07-27 NOTE — PROGRESS NOTES
HEPATOLOGY FOLLOW UP    Referring Physician: Dr. COURTNEY Parr  Current Corresponding Physician: Dr. COURTNEY Parr    Andra Matamoros is here for follow up of Abnormal Abdominal/Liver Imaging and Elevated Hepatic Enzymes      HPI  Andra Matamoros was seen in the Hepatology Clinic.  This 66-year-old woman had   originally been referred for elevated alkaline phosphatase and appear to be   biliary in nature.  Diagnostic evaluation including a liver biopsy has been negative. She was hospitalized with an elevated troponin and pulmonary embolism. A large infiltrating left lobe liver mass was seen. 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 Immunostains pending. No jaundice. C/o RUQ pain, constant.      Outpatient Encounter Prescriptions as of 7/27/2017   Medication Sig Dispense Refill    acetaminophen (TYLENOL) 500 MG tablet Take 500 mg by mouth nightly as needed for Pain.      enoxaparin (LOVENOX) 100 mg/mL Syrg Inject 0.9 mLs (90 mg total) into the skin once daily at 6am. 30 Syringe 2    [DISCONTINUED] alprazolam (XANAX) 0.25 MG tablet Take 1 tablet (0.25 mg total) by mouth 2 (two) times daily as needed for Anxiety (mri). 20 tablet 0    [DISCONTINUED] Lactobacillus rhamnosus GG (CULTURELLE) 10 billion cell capsule Take 1 capsule by mouth 2 (two) times daily.       Facility-Administered Encounter Medications as of 7/27/2017   Medication Dose Route Frequency Provider Last Rate Last Dose    [COMPLETED] magnesium sulfate 2g in water 50mL IVPB (premix)  2 g Intravenous Once Vinicio Rushing IV, MD   2 g at 07/18/17 0959    oxycodone-acetaminophen 5-325 mg per tablet 1 tablet  1 tablet Oral Q8H PRN Dwayne Sanches MD        [COMPLETED] potassium chloride CR capsule 20 mEq  20 mEq Oral Once Vinicio Rushing IV, MD   20 mEq at 07/18/17 1006    [DISCONTINUED] acetaminophen tablet 650 mg  650 mg Oral Q6H PRN Vinicio Rushing IV, MD   650 mg at 07/20/17 1043    [DISCONTINUED] benzonatate capsule 100 mg  100 mg Oral TID PRN  Vazquez Miller MD   100 mg at 07/18/17 2056    [DISCONTINUED] dextrose 50% injection 12.5 g  12.5 g Intravenous PRN Vazquez Miller MD        [DISCONTINUED] dextrose 50% injection 25 g  25 g Intravenous PRN Vazquez Miller MD        [DISCONTINUED] enoxaparin injection 90 mg  90 mg Subcutaneous Daily Vazquez Miller MD   90 mg at 07/18/17 0547    [DISCONTINUED] glucagon (human recombinant) injection 1 mg  1 mg Intramuscular PRN Vazquez Miller MD        [DISCONTINUED] glucose chewable tablet 16 g  16 g Oral PRN Vazquez Miller MD        [DISCONTINUED] glucose chewable tablet 24 g  24 g Oral PRN Vazquez Miller MD        [DISCONTINUED] heparin 25,000 units in dextrose 5% 250 mL (100 units/mL) infusion  17 Units/kg/hr Intravenous Continuous Vinicio PARIKH. Сергей WATERS MD        [DISCONTINUED] heparin 25,000 units in dextrose 5% 250 mL (100 units/mL) infusion  17 Units/kg/hr Intravenous Continuous Vinicio H. Сергей WATERS MD        [DISCONTINUED] heparin 25,000 units in dextrose 5% 250 mL (100 units/mL) infusion  17 Units/kg/hr Intravenous Continuous Vinicio H. Сергей WATERS MD        [DISCONTINUED] oxycodone immediate release tablet 10 mg  10 mg Oral Q4H PRN Vazquez Miller MD        [DISCONTINUED] oxycodone immediate release tablet 5 mg  5 mg Oral Q4H PRN Vazquez Miller MD   5 mg at 07/19/17 2020    [DISCONTINUED] ramelteon tablet 8 mg  8 mg Oral Nightly PRN Vinicio Rushing IV, MD         Review of patient's allergies indicates:  No Known Allergies  Past Medical History:   Diagnosis Date    Blood clot in vein     right lower extremity       Review of Systems   Constitutional: Negative for appetite change, fatigue and unexpected weight change.   HENT: Negative for ear pain, hearing loss, sore throat and trouble swallowing.    Eyes: Negative for visual disturbance.   Respiratory: Negative for cough and shortness of breath.    Cardiovascular: Negative for chest pain and palpitations.    Gastrointestinal: Positive for abdominal pain. Negative for nausea and vomiting.   Genitourinary: Negative for difficulty urinating and dysuria.   Musculoskeletal: Negative for arthralgias and back pain.   Skin: Negative for rash.   Neurological: Negative for tremors, seizures and headaches.   Psychiatric/Behavioral: Negative for agitation and decreased concentration.     Vitals:    07/27/17 0941   BP: 112/61   Pulse: 107   Resp: 16       Physical Exam   Constitutional: She is oriented to person, place, and time. She appears well-developed and well-nourished.   HENT:   Right Ear: External ear normal.   Left Ear: External ear normal.   Mouth/Throat: Oropharynx is clear and moist.   Eyes: No scleral icterus.   Cardiovascular: Normal rate, regular rhythm and normal heart sounds.  Exam reveals no gallop and no friction rub.    No murmur heard.  Pulmonary/Chest: Effort normal and breath sounds normal. No respiratory distress. She has no wheezes.   Abdominal: Soft. Bowel sounds are normal. She exhibits no distension and no mass. There is tenderness.   Musculoskeletal: Normal range of motion. She exhibits no edema.   Lymphadenopathy:     She has no cervical adenopathy.   Neurological: She is alert and oriented to person, place, and time. She has normal strength.   Skin: Skin is warm, dry and intact. She is not diaphoretic. No pallor.       MELD-Na score: 6 at 7/19/2017  4:49 AM  MELD score: 6 at 7/19/2017  4:49 AM  Calculated from:  Serum Creatinine: 0.6 mg/dL (Rounded to 1) at 7/19/2017  4:49 AM  Serum Sodium: 139 mmol/L (Rounded to 137) at 7/19/2017  4:49 AM  Total Bilirubin: 0.6 mg/dL (Rounded to 1) at 7/19/2017  4:49 AM  INR(ratio): 0.9 (Rounded to 1) at 7/18/2017  3:57 AM  Age: 66 years    Lab Results   Component Value Date     07/20/2017    BUN 17 07/20/2017    CREATININE 0.7 07/20/2017    CALCIUM 9.0 07/20/2017     07/20/2017    K 3.9 07/20/2017     07/20/2017    PROT 6.4 07/20/2017    CO2 25  07/20/2017    ANIONGAP 8 07/20/2017    WBC 7.83 07/20/2017    RBC 3.77 (L) 07/20/2017    HGB 10.5 (L) 07/20/2017    HCT 32.2 (L) 07/20/2017    MCV 85 07/20/2017    MCH 27.9 07/20/2017    MCHC 32.6 07/20/2017     Lab Results   Component Value Date    RDW 14.4 07/20/2017     07/20/2017    MPV 9.8 07/20/2017    GRAN 5.8 07/20/2017    GRAN 74.1 (H) 07/20/2017    LYMPH 0.9 (L) 07/20/2017    LYMPH 11.7 (L) 07/20/2017    MONO 0.9 07/20/2017    MONO 11.7 07/20/2017    EOSINOPHIL 1.9 07/20/2017    BASOPHIL 0.1 07/20/2017    EOS 0.2 07/20/2017    BASO 0.01 07/20/2017    APTT 24.3 07/18/2017    GROUPTRH O POS 07/17/2017    BNP 16 07/12/2017    CHOL 180 01/17/2017    TRIG 78 01/17/2017    HDL 69 01/17/2017    CHOLHDL 38.3 01/17/2017    TOTALCHOLEST 2.6 01/17/2017    ALBUMIN 2.8 (L) 07/20/2017    BILIDIR 0.5 (H) 07/17/2017     (H) 07/20/2017     (H) 07/20/2017    ALKPHOS 901 (H) 07/20/2017    MG 1.9 07/20/2017    LABPROT 10.1 07/18/2017    INR 0.9 07/18/2017       Assessment and Plan:  Patient Active Problem List   Diagnosis    Primary osteoarthritis of both hands    Elevated alkaline phosphatase level    Elevated liver enzymes    Dyspepsia    Phlebitis    Demand ischemia secondary to PE    Elevated troponin    Superficial thrombosis of right lower extremity    Acute pulmonary embolism bilateral    Elevated tumor markers    Liver mass, left lobe     Andra Matamoros is a 66 y.o. 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    Plan:  1. Analgesia  2. CA 19-9, CEA  3. Immunostains pending  4. Urgent referral to oncology

## 2017-07-28 ENCOUNTER — TELEPHONE (OUTPATIENT)
Dept: HEPATOLOGY | Facility: CLINIC | Age: 67
End: 2017-07-28

## 2017-07-28 ENCOUNTER — TELEPHONE (OUTPATIENT)
Dept: ENDOSCOPY | Facility: HOSPITAL | Age: 67
End: 2017-07-28

## 2017-07-28 ENCOUNTER — TELEPHONE (OUTPATIENT)
Dept: VASCULAR SURGERY | Facility: CLINIC | Age: 67
End: 2017-07-28

## 2017-07-28 DIAGNOSIS — Z12.11 SPECIAL SCREENING FOR MALIGNANT NEOPLASMS, COLON: Primary | ICD-10-CM

## 2017-07-28 RX ORDER — POLYETHYLENE GLYCOL 3350, SODIUM SULFATE ANHYDROUS, SODIUM BICARBONATE, SODIUM CHLORIDE, POTASSIUM CHLORIDE 236; 22.74; 6.74; 5.86; 2.97 G/4L; G/4L; G/4L; G/4L; G/4L
4 POWDER, FOR SOLUTION ORAL ONCE
Qty: 4000 ML | Refills: 0 | Status: SHIPPED | OUTPATIENT
Start: 2017-07-28 | End: 2017-07-28

## 2017-07-28 NOTE — TELEPHONE ENCOUNTER
Es in endo called and asked if patient can stop taking Lovenox 24hr prior to colonoscopy procedure. Per Dr. Hurt the patient can stop her lovenox 24 hr prior to procedure.

## 2017-07-28 NOTE — TELEPHONE ENCOUNTER
Called pt's daughter back no answer. Left message on [employer] to give office a call back.       Called Kanu at [phone] , no answer. Left message on [employer] to give me a call regarding pt appt on Monday that is not showing up.

## 2017-07-28 NOTE — TELEPHONE ENCOUNTER
----- Message from Andra Cox sent at 7/28/2017  4:11 PM CDT -----  Contact: daughter (Aixa)  Aixa says her mom is supposed to have an appt  on 7/31/17 for 2:30 with Dr. Sanches pt requesting a callback...    pt can be reached at [phone]     Thanks

## 2017-07-28 NOTE — TELEPHONE ENCOUNTER
Lovenox/OK per Dr Hurt/Dimple/Vascular Surgery for pt to take 1/2 dose Lovenox on Sunday 7/30/17 in am/she takes 100 mg once daily and will only take 50 mg Sunday 7/30/17 in AM, she is scheduled for a colonoscopy on Monday 7/31/17 at 9:00am with Dr Manriquez.

## 2017-07-30 ENCOUNTER — NURSE TRIAGE (OUTPATIENT)
Dept: ADMINISTRATIVE | Facility: CLINIC | Age: 67
End: 2017-07-30

## 2017-07-30 NOTE — TELEPHONE ENCOUNTER
"    Reason for Disposition   [1] Follow-up call from patient regarding patient's clinical status AND [2] information urgent    Answer Assessment - Initial Assessment Questions  1. REASON FOR CALL or QUESTION: "What is your reason for calling today?" or "How can I best  help you?" or "What question do you have that I can help answer?"      Vomited the first half of the colonoscopy prep and has no had a bowel movement.   2. CALLER: Document the source of call. (e.g., laboratory, patient).      Patient    Protocols used:  PCP CALL - NO TRIAGE-A-    Patient called to report that she has vomited the first 1/2 of the colonoscopy prep and has not had a bowel movement yet. Procedure is for 9am. Her call was transferred to the  to be connected with on call provider for Endoscopy today.   "

## 2017-07-31 ENCOUNTER — ANESTHESIA (OUTPATIENT)
Dept: ENDOSCOPY | Facility: HOSPITAL | Age: 67
End: 2017-07-31
Payer: MEDICARE

## 2017-07-31 ENCOUNTER — SURGERY (OUTPATIENT)
Age: 67
End: 2017-07-31

## 2017-07-31 ENCOUNTER — HOSPITAL ENCOUNTER (OUTPATIENT)
Facility: HOSPITAL | Age: 67
Discharge: HOME | End: 2017-07-31
Attending: INTERNAL MEDICINE | Admitting: INTERNAL MEDICINE
Payer: MEDICARE

## 2017-07-31 ENCOUNTER — PATIENT MESSAGE (OUTPATIENT)
Dept: HEPATOLOGY | Facility: CLINIC | Age: 67
End: 2017-07-31

## 2017-07-31 ENCOUNTER — PATIENT MESSAGE (OUTPATIENT)
Dept: INTERNAL MEDICINE | Facility: CLINIC | Age: 67
End: 2017-07-31

## 2017-07-31 ENCOUNTER — ANESTHESIA EVENT (OUTPATIENT)
Dept: ENDOSCOPY | Facility: HOSPITAL | Age: 67
End: 2017-07-31
Payer: MEDICARE

## 2017-07-31 VITALS
RESPIRATION RATE: 12 BRPM | OXYGEN SATURATION: 97 % | WEIGHT: 130 LBS | BODY MASS INDEX: 22.2 KG/M2 | HEIGHT: 64 IN | DIASTOLIC BLOOD PRESSURE: 45 MMHG | SYSTOLIC BLOOD PRESSURE: 119 MMHG | HEART RATE: 91 BPM | TEMPERATURE: 99 F

## 2017-07-31 DIAGNOSIS — Z12.11 COLON CANCER SCREENING: ICD-10-CM

## 2017-07-31 DIAGNOSIS — R16.0 LIVER MASS, LEFT LOBE: Primary | ICD-10-CM

## 2017-07-31 PROCEDURE — 37000009 HC ANESTHESIA EA ADD 15 MINS: Performed by: INTERNAL MEDICINE

## 2017-07-31 PROCEDURE — D9220A PRA ANESTHESIA: Mod: 33,ANES,, | Performed by: ANESTHESIOLOGY

## 2017-07-31 PROCEDURE — D9220A PRA ANESTHESIA: Mod: 33,CRNA,, | Performed by: NURSE ANESTHETIST, CERTIFIED REGISTERED

## 2017-07-31 PROCEDURE — 63600175 PHARM REV CODE 636 W HCPCS: Performed by: ANESTHESIOLOGY

## 2017-07-31 PROCEDURE — 63600175 PHARM REV CODE 636 W HCPCS: Performed by: NURSE ANESTHETIST, CERTIFIED REGISTERED

## 2017-07-31 PROCEDURE — 25000003 PHARM REV CODE 250: Performed by: COLON & RECTAL SURGERY

## 2017-07-31 PROCEDURE — 37000008 HC ANESTHESIA 1ST 15 MINUTES: Performed by: INTERNAL MEDICINE

## 2017-07-31 PROCEDURE — G0121 COLON CA SCRN NOT HI RSK IND: HCPCS | Performed by: INTERNAL MEDICINE

## 2017-07-31 PROCEDURE — G0121 COLON CA SCRN NOT HI RSK IND: HCPCS | Mod: ,,, | Performed by: INTERNAL MEDICINE

## 2017-07-31 RX ORDER — PROPOFOL 10 MG/ML
VIAL (ML) INTRAVENOUS CONTINUOUS PRN
Status: DISCONTINUED | OUTPATIENT
Start: 2017-07-31 | End: 2017-07-31

## 2017-07-31 RX ORDER — ONDANSETRON 2 MG/ML
4 INJECTION INTRAMUSCULAR; INTRAVENOUS ONCE
Status: DISCONTINUED | OUTPATIENT
Start: 2017-07-31 | End: 2017-07-31 | Stop reason: HOSPADM

## 2017-07-31 RX ORDER — PROPOFOL 10 MG/ML
VIAL (ML) INTRAVENOUS
Status: DISCONTINUED | OUTPATIENT
Start: 2017-07-31 | End: 2017-07-31

## 2017-07-31 RX ORDER — LIDOCAINE HCL/PF 100 MG/5ML
SYRINGE (ML) INTRAVENOUS
Status: DISCONTINUED | OUTPATIENT
Start: 2017-07-31 | End: 2017-07-31

## 2017-07-31 RX ORDER — FENTANYL CITRATE 50 UG/ML
50 INJECTION, SOLUTION INTRAMUSCULAR; INTRAVENOUS ONCE
Status: DISCONTINUED | OUTPATIENT
Start: 2017-07-31 | End: 2017-07-31 | Stop reason: HOSPADM

## 2017-07-31 RX ORDER — SODIUM CHLORIDE 9 MG/ML
INJECTION, SOLUTION INTRAVENOUS CONTINUOUS
Status: DISCONTINUED | OUTPATIENT
Start: 2017-07-31 | End: 2017-07-31 | Stop reason: HOSPADM

## 2017-07-31 RX ORDER — FENTANYL CITRATE 50 UG/ML
50 INJECTION, SOLUTION INTRAMUSCULAR; INTRAVENOUS ONCE
Status: COMPLETED | OUTPATIENT
Start: 2017-07-31 | End: 2017-07-31

## 2017-07-31 RX ADMIN — LIDOCAINE HYDROCHLORIDE 100 MG: 20 INJECTION, SOLUTION INTRAVENOUS at 10:07

## 2017-07-31 RX ADMIN — SODIUM CHLORIDE: 0.9 INJECTION, SOLUTION INTRAVENOUS at 08:07

## 2017-07-31 RX ADMIN — PROPOFOL 60 MG: 10 INJECTION, EMULSION INTRAVENOUS at 10:07

## 2017-07-31 RX ADMIN — SODIUM CHLORIDE: 0.9 INJECTION, SOLUTION INTRAVENOUS at 10:07

## 2017-07-31 RX ADMIN — PROPOFOL 200 MCG/KG/MIN: 10 INJECTION, EMULSION INTRAVENOUS at 10:07

## 2017-07-31 RX ADMIN — FENTANYL CITRATE 50 MCG: 50 INJECTION INTRAMUSCULAR; INTRAVENOUS at 08:07

## 2017-07-31 NOTE — INTERVAL H&P NOTE
The patient has been examined and the H&P has been reviewed:    I concur with the findings and no changes have occurred since H&P was written.    Anesthesia/Surgery risks, benefits and alternative options discussed and understood by patient/family.  History and Exam unchanged from visit. EGD unremarkable. Liver ca of unknown primary    Procedure - Colonoscopy  Neck - supple  Plan of anesthesia - General  ASA - per anesthesia  Mallampati - per anesthesia  Anesthesia problems - no  Family history of anesthesia problems - no          There are no hospital problems to display for this patient.

## 2017-07-31 NOTE — TRANSFER OF CARE
"Anesthesia Transfer of Care Note    Patient: Andra Matamoros    Procedure(s) Performed: Procedure(s) (LRB):  COLONOSCOPY (Left)    Patient location: [facility]    Anesthesia Type: general    Transport from OR: Transported from OR on room air with adequate spontaneous ventilation    Post pain: adequate analgesia    Post assessment: tolerated procedure well and no apparent anesthetic complications    Post vital signs: stable    Level of consciousness: sedated    Nausea/Vomiting: no nausea/vomiting    Complications: none    Transfer of care protocol was followed      Last vitals:   Visit Vitals  BP (!) 141/63 (BP Location: Left arm, Patient Position: Lying, BP Method: Automatic)   Pulse 100   Temp 36.9 °C (98.5 °F) (Oral)   Resp 18   Ht 5' 4" (1.626 m)   Wt 59 kg (130 lb)   SpO2 98%   Breastfeeding? No   BMI 22.31 kg/m²     "

## 2017-07-31 NOTE — ANESTHESIA PREPROCEDURE EVALUATION
07/31/2017  Andra Matamoros is a 66 y.o., female.    Pre-op Assessment    I have reviewed the Patient Summary Reports.     I have reviewed the Nursing Notes.   I have reviewed the Medications.     Review of Systems  Anesthesia Hx:  Denies Hx of Anesthetic complications  History of prior surgery of interest to airway management or planning: Denies Family Hx of Anesthesia complications.  Personal Hx of Anesthesia complications, Post-Operative Nausea/Vomiting, in the past, but not with recent anesthetics / prophylaxis   Cardiovascular:  Cardiovascular Normal     Pulmonary:  Pulmonary Normal    Renal/:  Renal/ Normal     Hepatic/GI:  Hepatic/GI Normal Elevated LFTs   Neurological:  Neurology Normal        Physical Exam  General:  Well nourished    Airway/Jaw/Neck:  Airway Findings: Mouth Opening: Normal Tongue: Normal  General Airway Assessment: Adult  Mallampati: I  Improves to I with phonation.  TM Distance: Normal, at least 6 cm  Jaw/Neck Findings:  Micrognathia: Negative Neck ROM: Normal ROM      Dental:  Dental Findings: In tact   Chest/Lungs:  Chest/Lungs Findings: Clear to auscultation, Normal Respiratory Rate     Heart/Vascular:  Heart Findings: Rate: Normal  Rhythm: Regular Rhythm  Sounds: Normal  Heart murmur: negative    Abdomen:  Abdomen Findings:  Normal, Nontender, Soft       Mental Status:  Mental Status Findings:  Cooperative, Alert and Oriented         Anesthesia Plan  Type of Anesthesia, risks & benefits discussed:  Anesthesia Type:  MAC, general  Patient's Preference:   Intra-op Monitoring Plan:   Intra-op Monitoring Plan Comments:   Post Op Pain Control Plan:   Post Op Pain Control Plan Comments:   Induction:   IV  Beta Blocker:  Patient is not currently on a Beta-Blocker (No further documentation required).       Informed Consent: Patient understands risks and agrees with Anesthesia plan.   Questions answered. Anesthesia consent signed with patient.  ASA Score: 1     Day of Surgery Review of History & Physical:    H&P update referred to the provider.         Ready For Surgery From Anesthesia Perspective.

## 2017-07-31 NOTE — PATIENT INSTRUCTIONS
Discharge Summary/Instructions after an Endoscopic Procedure  Patient Name: Andra Matamoros  Patient MRN: [id number]  Patient YOB: 1950  Monday, July 31, 2017  David Padilla MD  RESTRICTIONS ON ACTIVITY:  - DO NOT drive a car, operate machinery, make legal/financial decisions, or   drink alcohol until the day after the procedure.    - The following day: return to full activity including work, except no heavy   lifting, straining or running for 3 days if polyps were removed.  - Diet: Eat and drink normally unless instructed otherwise.  TREATMENT FOR COMMON SIDE EFFECTS:  - Mild abdominal pain, bloating or excessive gas: rest, eat lightly and use   a heating pad.  - Sore Throat - treat with throat lozenges. Gargle with warm salt water.  SYMPTOMS TO WATCH FOR AND REPORT TO YOUR PHYSICIAN:  1. Severe abdominal pain or bloating.  2. Pain in chest.  3. Chills or fever occurring within 24 hours after a procedure.  4. A large amount of rectal bleeding, which would show as bright red,   maroon, or black stools. (A small amount of blood from the rectum is not   serious, especially if hemorrhoids are present.)  5. Because air was used during the procedure, expelling large amounts of air   from your rectum or belching is normal.  6. If a bowel prep was taken, you may not have a bowel movement for 1-3   days.  This is normal.  7. Go directly to the emergency room if you notice any of the following:   Chills and/or fever over 101 F   Persistent vomiting   Severe abdominal pain, other than gas cramps   Severe chest pain   Black, tarry stools   Any bleeding - exceeding one tablespoon  Your doctor recommends these additional instructions:  If any biopsies were performed, my office will call you in 5 to 6 business   days with any results.  You have a contact number available for emergencies.  The signs and symptoms   of potential delayed complications were discussed with you.  You may return   to normal activities tomorrow.   Written discharge instructions were   provided to you.   You are being discharged to home.   Your physician has recommended a repeat colonoscopy in 10 years for   surveillance based on pathology results.   The findings and recommendations were discussed with your designated   responsible adult.  For questions, problems or results please call your physician - David Padilla MD at Work:  [phone].  [facility], EMERGENCY ROOM PHONE NUMBER: [phone]  IF A COMPLICATION OR EMERGENCY SITUATION ARISES AND YOU ARE UNABLE TO REACH   YOUR PHYSICIAN - GO TO THE EMERGENCY ROOM.  David Padilla MD  7/31/2017 10:28:20 AM  This report has been verified and signed electronically.

## 2017-07-31 NOTE — ANESTHESIA POSTPROCEDURE EVALUATION
"Anesthesia Post Evaluation    Patient: Andra Matamoros    Procedure(s) Performed: Procedure(s) (LRB):  COLONOSCOPY (Left)    Final Anesthesia Type: general  Patient location during evaluation: GI PACU  Patient participation: Yes- Able to Participate  Level of consciousness: awake and alert, oriented and awake  Post-procedure vital signs: reviewed and stable  Pain management: adequate  Airway patency: patent  PONV status at discharge: No PONV  Anesthetic complications: no      Cardiovascular status: blood pressure returned to baseline and hemodynamically stable  Respiratory status: unassisted, spontaneous ventilation and room air  Hydration status: euvolemic  Follow-up not needed.        Visit Vitals  BP (!) 119/45 (BP Location: Left arm, Patient Position: Lying, BP Method: Automatic)   Pulse 91   Temp 37.1 °C (98.7 °F) (Oral)   Resp 12   Ht 5' 4" (1.626 m)   Wt 59 kg (130 lb)   SpO2 97%   Breastfeeding? No   BMI 22.31 kg/m²       Pain/Darling Score: Pain Assessment Performed: Yes (7/31/2017 11:04 AM)  Presence of Pain: denies (7/31/2017 11:04 AM)  Pain Rating Prior to Med Admin: 0 (7/31/2017 11:04 AM)  Pain Rating Post Med Admin: 0 (7/31/2017 11:04 AM)  Darling Score: 10 (7/31/2017 11:04 AM)      "

## 2017-07-31 NOTE — INTERVAL H&P NOTE
The patient has been examined and the H&P has been reviewed:    I concur with the findings and no changes have occurred since H&P was written.     History and Exam unchanged from visit. Screening. Liver mass seen with adenoca    Procedure - Colonoscopy  Neck - supple  Plan of anesthesia - General  ASA - per anesthesia  Mallampati - per anesthesia  Anesthesia problems - no  Family history of anesthesia problems - no      Anesthesia/Surgery risks, benefits and alternative options discussed and understood by patient/family.          There are no hospital problems to display for this patient.

## 2017-08-01 ENCOUNTER — INITIAL CONSULT (OUTPATIENT)
Dept: HEMATOLOGY/ONCOLOGY | Facility: CLINIC | Age: 67
End: 2017-08-01
Payer: MEDICARE

## 2017-08-01 ENCOUNTER — TELEPHONE (OUTPATIENT)
Dept: HEMATOLOGY/ONCOLOGY | Facility: CLINIC | Age: 67
End: 2017-08-01

## 2017-08-01 VITALS
BODY MASS INDEX: 22.74 KG/M2 | HEIGHT: 64 IN | WEIGHT: 133.19 LBS | HEART RATE: 106 BPM | TEMPERATURE: 98 F | OXYGEN SATURATION: 96 % | RESPIRATION RATE: 16 BRPM | SYSTOLIC BLOOD PRESSURE: 113 MMHG | DIASTOLIC BLOOD PRESSURE: 65 MMHG

## 2017-08-01 DIAGNOSIS — K86.89 PANCREATIC INSUFFICIENCY: Primary | ICD-10-CM

## 2017-08-01 DIAGNOSIS — C78.7 SECONDARY LIVER CANCER: ICD-10-CM

## 2017-08-01 DIAGNOSIS — C22.1 CHOLANGIOCARCINOMA: ICD-10-CM

## 2017-08-01 DIAGNOSIS — G89.3 NEOPLASM RELATED PAIN: ICD-10-CM

## 2017-08-01 PROCEDURE — 99999 PR PBB SHADOW E&M-EST. PATIENT-LVL IV: CPT | Mod: PBBFAC,,, | Performed by: INTERNAL MEDICINE

## 2017-08-01 PROCEDURE — 99215 OFFICE O/P EST HI 40 MIN: CPT | Mod: S$PBB,,, | Performed by: INTERNAL MEDICINE

## 2017-08-01 PROCEDURE — 99499 UNLISTED E&M SERVICE: CPT | Mod: S$PBB,,, | Performed by: INTERNAL MEDICINE

## 2017-08-01 RX ORDER — MORPHINE SULFATE 15 MG/1
15 TABLET, FILM COATED, EXTENDED RELEASE ORAL 2 TIMES DAILY
Qty: 60 TABLET | Refills: 0 | Status: SHIPPED | OUTPATIENT
Start: 2017-08-01

## 2017-08-01 RX ORDER — OXYCODONE HYDROCHLORIDE 5 MG/1
5 TABLET ORAL EVERY 6 HOURS PRN
Qty: 90 TABLET | Refills: 0 | Status: SHIPPED | OUTPATIENT
Start: 2017-08-01 | End: 2018-08-01

## 2017-08-01 NOTE — PROGRESS NOTES
Subjective:       Patient ID: Andra Matamoros is a 66 y.o. female.    Chief Complaint: No chief complaint on file.    Mrs. Borja is a 66 year old female with a longstanding history of elevated liver enzymes of undetermined significance who was admitted to the hospital 17 with left upper extremity pain, fatigue, abdominal bloating, early satiety, poor appetite and persistent cough. She had a a mild troponin elevation that has been downtrending (as high as 0.735). Patient was diagnosed with a superficial right lower extremity thrombus and due to its proximity to deep vein, she was placed on anticoagulation with abixiban 5 mg BID. Patient travels to and from [place] and noticed right calf pain and swelling while there, shortly after a recent upper endoscopy performed to work up abdominal discomfort. She reports her abdominal symptoms and 20 lb weight loss began after her liver biopsy in 2017. Liver biopsy was performed to work up elevated liver enzymes. No abnormalities were found on biopsy or US of liver at that time (random biopsy of right lobe). She had a CT 17 which showed heterogeneous primarily hypodense area involving the majority of the left hepatic lobe. CTA was performed  and showed bilateral pulm emboli. Ca 19-9 was >50,000. MRI performed 17 shows an infiltrative lesion in left hepatic lobe. She was discharged but then readmitted and had a liver biopsy of affected area 17. Biopsy was positive for adenocarcinoma and path report showed the following:  CK 7: Positive, strong and diffuse  CK 20: Positive, strong and diffuse  Hepatocyte: Negative  CDX2: Negative  TTF-1: Negative  GCDFP: Negative  Findings suggestive of upper GI origin but not specific.     Past Medical History:  Recent diagnosis of DVT and PE    Past Surgical History:   SECTION  CHOLECYSTECTOMY  COLONOSCOPY   TONSILLECTOMY    Social History    Marital status:                Social History Main  Topics    Smoking status: Never Smoker                                                                             Alcohol use: Yes                Comment: Grand Nguyen periodically    Review of patient's family history indicates:    Breast cancer                  Sister                    Cancer                         Father                    Vasculitis                     Mother                    Paget's disease of bone        Mother                                                 Review of Systems   Constitutional: Negative for fatigue and fever.   HENT: Negative for congestion and trouble swallowing.    Eyes: Negative for photophobia and visual disturbance.   Respiratory: Negative for cough and shortness of breath.    Cardiovascular: Negative for chest pain and leg swelling.   Gastrointestinal: Positive for abdominal distention and abdominal pain. Negative for constipation.   Endocrine: Negative for polyphagia and polyuria.   Genitourinary: Negative for dysuria and urgency.   Musculoskeletal: Negative for neck pain and neck stiffness.   Skin: Negative for color change and pallor.   Neurological: Negative for light-headedness and headaches.   Hematological: Negative for adenopathy. Does not bruise/bleed easily.   Psychiatric/Behavioral: Negative for agitation and behavioral problems.       Objective:      Physical Exam   Constitutional: She is oriented to person, place, and time. She appears well-developed.   HENT:   Mouth/Throat: Oropharynx is clear and moist.   Eyes: Conjunctivae are normal. Pupils are equal, round, and reactive to light.   Cardiovascular: Normal rate and regular rhythm.    Pulmonary/Chest: Effort normal and breath sounds normal.   Abdominal: Soft. There is tenderness.   Musculoskeletal: Normal range of motion. She exhibits no edema.   Lymphadenopathy:     She has no cervical adenopathy.   Neurological: She is alert and oriented to person, place, and time.   Skin: Skin is warm and dry.    Psychiatric: She has a normal mood and affect. Her behavior is normal.       Assessment:       1. Pancreatic insufficiency    2. Cholangiocarcinoma    3. Secondary liver cancer    4. Neoplasm related pain        Plan:   Patient has an intrahepatic cancer of undetermined primary at this time. Given elevated ca -19-9 and location, high suspicion for intrahepatic cholangiocarcinoma. However, upper GI and pancreas is on the differential. Will plan to treat with FOLFOX, that will cover for any of these malignancies, every two weeks and reassess after four cycles. PORT will need to be placed. Labs should be drawn prior to each chemo session, CBC, CMP. Will see patient before chemotherapy sessions. Literature on FOLFOX was provided. Reviewed potential side effects of therapy. All questions answered to satisfaction.     Leslie Jaime MD   Pager [phone]    STAFF NOTE:  I have personally taken the history and examined this patient and agree with Dr. Zarate's Note as stated above.

## 2017-08-01 NOTE — TELEPHONE ENCOUNTER
----- Message from Henry Snyder sent at 8/1/2017 10:31 AM CDT -----  Contact: Rich or Cheryl from [facility]  Would like a call back from nurse in ref new medication and possible mix up    Can be reached at [phone]

## 2017-08-01 NOTE — Clinical Note
Patient needs a port placed ASAP for planned chemotherapy - hopefully next week. FOLFOX. Will need day one chemo and day 3 pump removal. Chemo is every 2 weeks. Labs:cbc, cmp every two weeks x4 starting week of chemo. Patient should be seen again two weeks after chemo starts. So she should follow up with me and ciara for her second cycle of FOLFOX.

## 2017-08-01 NOTE — PATIENT INSTRUCTIONS
Fluorouracil, 5-FU injection  What is this medicine?  FLUOROURACIL, 5-FU (flure oh YOOR a minoo) is a chemotherapy drug. It slows the growth of cancer cells. This medicine is used to treat many types of cancer like breast cancer, colon or rectal cancer, pancreatic cancer, and stomach cancer.  How should I use this medicine?  This drug is given as an infusion or injection into a vein. It is administered in a hospital or clinic by a specially trained health care professional.  Talk to your pediatrician regarding the use of this medicine in children. Special care may be needed.  What side effects may I notice from receiving this medicine?  Side effects that you should report to your doctor or health care professional as soon as possible:  · allergic reactions like skin rash, itching or hives, swelling of the face, lips, or tongue  · low blood counts - this medicine may decrease the number of white blood cells, red blood cells and platelets. You may be at increased risk for infections and bleeding.  · signs of infection - fever or chills, cough, sore throat, pain or difficulty passing urine  · signs of decreased platelets or bleeding - bruising, pinpoint red spots on the skin, black, tarry stools, blood in the urine  · signs of decreased red blood cells - unusually weak or tired, fainting spells, lightheadedness  · breathing problems  · changes in vision  · chest pain  · mouth sores  · nausea and vomiting  · pain, swelling, redness at site where injected  · pain, tingling, numbness in the hands or feet  · redness, swelling, or sores on hands or feet  · stomach pain  · unusual bleeding  Side effects that usually do not require medical attention (report to your doctor or health care professional if they continue or are bothersome):  · changes in finger or toe nails  · diarrhea  · dry or itchy skin  · hair loss  · headache  · loss of appetite  · sensitivity of eyes to the light  · stomach upset  · unusually teary  eyes  What may interact with this medicine?  · allopurinol  · cimetidine  · dapsone  · digoxin  · hydroxyurea  · leucovorin  · levamisole  · medicines for seizures like ethotoin, fosphenytoin, phenytoin  · medicines to increase blood counts like filgrastim, pegfilgrastim, sargramostim  · medicines that treat or prevent blood clots like warfarin, enoxaparin, and dalteparin  · methotrexate  · metronidazole  · pyrimethamine  · some other chemotherapy drugs like busulfan, cisplatin, estramustine, vinblastine  · trimethoprim  · trimetrexate  · vaccines  Talk to your doctor or health care professional before taking any of these medicines:  · acetaminophen  · aspirin  · ibuprofen  · ketoprofen  · naproxen  What if I miss a dose?  It is important not to miss your dose. Call your doctor or health care professional if you are unable to keep an appointment.  Where should I keep my medicine?  This drug is given in a hospital or clinic and will not be stored at home.  What should I tell my health care provider before I take this medicine?  They need to know if you have any of these conditions:  · blood disorders  · dihydropyrimidine dehydrogenase (DPD) deficiency  · infection (especially a virus infection such as chickenpox, cold sores, or herpes)  · kidney disease  · liver disease  · malnourished, poor nutrition  · recent or ongoing radiation therapy  · an unusual or allergic reaction to fluorouracil, other chemotherapy, other medicines, foods, dyes, or preservatives  · pregnant or trying to get pregnant  · breast-feeding  What should I watch for while using this medicine?  Visit your doctor for checks on your progress. This drug may make you feel generally unwell. This is not uncommon, as chemotherapy can affect healthy cells as well as cancer cells. Report any side effects. Continue your course of treatment even though you feel ill unless your doctor tells you to stop.  In some cases, you may be given additional medicines to  help with side effects. Follow all directions for their use.  Call your doctor or health care professional for advice if you get a fever, chills or sore throat, or other symptoms of a cold or flu. Do not treat yourself. This drug decreases your body's ability to fight infections. Try to avoid being around people who are sick.  This medicine may increase your risk to bruise or bleed. Call your doctor or health care professional if you notice any unusual bleeding.  Be careful brushing and flossing your teeth or using a toothpick because you may get an infection or bleed more easily. If you have any dental work done, tell your dentist you are receiving this medicine.  Avoid taking products that contain aspirin, acetaminophen, ibuprofen, naproxen, or ketoprofen unless instructed by your doctor. These medicines may hide a fever.  Do not become pregnant while taking this medicine. Women should inform their doctor if they wish to become pregnant or think they might be pregnant. There is a potential for serious side effects to an unborn child. Talk to your health care professional or pharmacist for more information. Do not breast-feed an infant while taking this medicine.  Men should inform their doctor if they wish to father a child. This medicine may lower sperm counts.  Do not treat diarrhea with over the counter products. Contact your doctor if you have diarrhea that lasts more than 2 days or if it is severe and watery.  This medicine can make you more sensitive to the sun. Keep out of the sun. If you cannot avoid being in the sun, wear protective clothing and use sunscreen. Do not use sun lamps or tanning beds/booths.  Date Last Reviewed:   NOTE:This sheet is a summary. It may not cover all possible information. If you have questions about this medicine, talk to your doctor, pharmacist, or health care provider. Copyright© 2016 [employer]        Oxaliplatin Injection  What is this medicine?  OXALIPLATIN (ox AL i GERTRUDIS  tin) is a chemotherapy drug. It targets fast dividing cells, like cancer cells, and causes these cells to die. This medicine is used to treat cancers of the colon and rectum, and many other cancers.  How should I use this medicine?  This drug is given as an infusion into a vein. It is administered in a hospital or clinic by a specially trained health care professional.  Talk to your pediatrician regarding the use of this medicine in children. Special care may be needed.  What side effects may I notice from receiving this medicine?  Side effects that you should report to your doctor or health care professional as soon as possible:  · allergic reactions like skin rash, itching or hives, swelling of the face, lips, or tongue  · low blood counts - This drug may decrease the number of white blood cells, red blood cells and platelets. You may be at increased risk for infections and bleeding.  · signs of infection - fever or chills, cough, sore throat, pain or difficulty passing urine  · signs of decreased platelets or bleeding - bruising, pinpoint red spots on the skin, black, tarry stools, nosebleeds  · signs of decreased red blood cells - unusually weak or tired, fainting spells, lightheadedness  · breathing problems  · chest pain, pressure  · cough  · diarrhea  · jaw tightness  · mouth sores  · nausea and vomiting  · pain, swelling, redness or irritation at the injection site  · pain, tingling, numbness in the hands or feet  · problems with balance, talking, walking  · redness, blistering, peeling or loosening of the skin, including inside the mouth  · trouble passing urine or change in the amount of urine  Side effects that usually do not require medical attention (report to your doctor or health care professional if they continue or are bothersome):  · changes in vision  · constipation  · hair loss  · loss of appetite  · metallic taste in the mouth or changes in taste  · stomach pain  What may interact with this  medicine?  · medicines to increase blood counts like filgrastim, pegfilgrastim, sargramostim  · probenecid  · some antibiotics like amikacin, gentamicin, neomycin, polymyxin B, streptomycin, tobramycin  · zalcitabine  Talk to your doctor or health care professional before taking any of these medicines:  · acetaminophen  · aspirin  · ibuprofen  · ketoprofen  · naproxen  What if I miss a dose?  It is important not to miss a dose. Call your doctor or health care professional if you are unable to keep an appointment.  Where should I keep my medicine?  This drug is given in a hospital or clinic and will not be stored at home.  What should I tell my health care provider before I take this medicine?  They need to know if you have any of these conditions:  · kidney disease  · an unusual or allergic reaction to oxaliplatin, other chemotherapy, other medicines, foods, dyes, or preservatives  · pregnant or trying to get pregnant  · breast-feeding  What should I watch for while using this medicine?  Your condition will be monitored carefully while you are receiving this medicine. You will need important blood work done while you are taking this medicine.  This medicine can make you more sensitive to cold. Do not drink cold drinks or use ice. Cover exposed skin before coming in contact with cold temperatures or cold objects. When out in cold weather wear warm clothing and cover your mouth and nose to warm the air that goes into your lungs. Tell your doctor if you get sensitive to the cold.  This drug may make you feel generally unwell. This is not uncommon, as chemotherapy can affect healthy cells as well as cancer cells. Report any side effects. Continue your course of treatment even though you feel ill unless your doctor tells you to stop.  In some cases, you may be given additional medicines to help with side effects. Follow all directions for their use.  Call your doctor or health care professional for advice if you get a  fever, chills or sore throat, or other symptoms of a cold or flu. Do not treat yourself. This drug decreases your body's ability to fight infections. Try to avoid being around people who are sick.  This medicine may increase your risk to bruise or bleed. Call your doctor or health care professional if you notice any unusual bleeding.  Be careful brushing and flossing your teeth or using a toothpick because you may get an infection or bleed more easily. If you have any dental work done, tell your dentist you are receiving this medicine.  Avoid taking products that contain aspirin, acetaminophen, ibuprofen, naproxen, or ketoprofen unless instructed by your doctor. These medicines may hide a fever.  Do not become pregnant while taking this medicine. Women should inform their doctor if they wish to become pregnant or think they might be pregnant. There is a potential for serious side effects to an unborn child. Talk to your health care professional or pharmacist for more information. Do not breast-feed an infant while taking this medicine.  Call your doctor or health care professional if you get diarrhea. Do not treat yourself.  Date Last Reviewed:   NOTE:This sheet is a summary. It may not cover all possible information. If you have questions about this medicine, talk to your doctor, pharmacist, or health care provider. Copyright© 2016 [employer]        Leucovorin injection  What is this medicine?  LEUCOVORIN (loo koe VOR in) is used to prevent or treat the harmful effects of some medicines. This medicine is used to treat anemia caused by a low amount of folic acid in the body. It is also used with 5-fluorouracil (5-FU) to treat colon cancer.  How should I use this medicine?  This medicine is for injection into a muscle or into a vein. It is given by a health care professional in a hospital or clinic setting.  Talk to your pediatrician regarding the use of this medicine in children. Special care may be needed.  What  side effects may I notice from receiving this medicine?  Side effects that you should report to your doctor or health care professional as soon as possible:  · allergic reactions like skin rash, itching or hives, swelling of the face, lips, or tongue  · breathing problems  · fever, infection  · mouth sores  · unusual bleeding or bruising  · unusually weak or tired  Side effects that usually do not require medical attention (report to your doctor or health care professional if they continue or are bothersome):  · constipation or diarrhea  · loss of appetite  · nausea, vomiting  What may interact with this medicine?  · capecitabine  · fluorouracil  · phenobarbital  · phenytoin  · primidone  · trimethoprim-sulfamethoxazole  What if I miss a dose?  This does not apply.  Where should I keep my medicine?  This drug is given in a hospital or clinic and will not be stored at home.  What should I tell my health care provider before I take this medicine?  They need to know if you have any of these conditions:  · anemia from low levels of vitamin B-12 in the blood  · an unusual or allergic reaction to leucovorin, folic acid, other medicines, foods, dyes, or preservatives  · pregnant or trying to get pregnant  · breast-feeding  What should I watch for while using this medicine?  Your condition will be monitored carefully while you are receiving this medicine.  This medicine may increase the side effects of 5-fluorouracil, 5-FU. Tell your doctor or health care professional if you have diarrhea or mouth sores that do not get better or that get worse.  Date Last Reviewed:   NOTE:This sheet is a summary. It may not cover all possible information. If you have questions about this medicine, talk to your doctor, pharmacist, or health care provider. Copyright© 2016 [employer]

## 2017-08-01 NOTE — LETTER
August 1, 2017      Ross Marte MD  [address]  [facility] [address]           [place] [facility] Hematology Oncology  [address]  [facility] [address]  Phone: [phone]          Patient: Andra Matamoros   MR Number: [id number]   YOB: 1950   Date of Visit: 8/1/2017       Dear Dr. Ross Marte:    Thank you for referring Andra Matamoros to me for evaluation. Attached you will find relevant portions of my assessment and plan of care.    If you have questions, please do not hesitate to call me. I look forward to following Andra Matamoros along with you.    Sincerely,    Nika Narayanan MD    Enclosure  CC:  No Recipients    If you would like to receive this communication electronically, please contact [email or web address] or [phone] to request more information on [employer] Link access.    For providers and/or their staff who would like to refer a patient to [facility], please contact us through our one-stop-shop provider referral line, [facility], at [phone].    If you feel you have received this communication in error or would no longer like to receive these types of communications, please e-mail [email or web address]

## 2017-08-02 ENCOUNTER — TELEPHONE (OUTPATIENT)
Dept: HEMATOLOGY/ONCOLOGY | Facility: CLINIC | Age: 67
End: 2017-08-02

## 2017-08-02 ENCOUNTER — PATIENT MESSAGE (OUTPATIENT)
Dept: HEMATOLOGY/ONCOLOGY | Facility: CLINIC | Age: 67
End: 2017-08-02

## 2017-08-02 DIAGNOSIS — K86.89 PANCREATIC INSUFFICIENCY: Primary | ICD-10-CM

## 2017-08-02 DIAGNOSIS — C22.1 CHOLANGIOCARCINOMA: Primary | ICD-10-CM

## 2017-08-02 NOTE — TELEPHONE ENCOUNTER
called pt on today in regards to appointment on 08/8/17 but received no answer, a detailed message was left on v/m to contact office with any questions.

## 2017-08-02 NOTE — PHYSICIAN QUERY
PT Name: Andra Matamoros  MR #: [id number]    Physician Query Form - Pathology Findings Clarification     CDS/: Winnie Nye               Contact information: [email or web address]  This form is a permanent document in the medical record.     Query Date: August 2, 2017      By submitting this query, we are merely seeking further clarification of documentation.  Please utilize your independent clinical judgment when addressing the question(s) below.      The medical record contains the following:     Findings Supporting Clinical Information Location in Medical Record      Left hepatic mass    left liver mass-adenocarcinoma   7-19-17 pathology report     Please document the clinical significance of the Pathologists findings of left liver lobe adenocarcinoma_.          [ x ] I agree with the Pathology Findings        [  ] I do not agree with the Pathology Findings        [  ] Clinically Insignificant        [  ] Clinically Undetermined        [  ] Other/Clarification of Findings: ______________________________________________    Please document in your progress notes daily for the duration of treatment until resolved and include in your discharge summary.

## 2017-08-03 ENCOUNTER — PATIENT MESSAGE (OUTPATIENT)
Dept: INTERNAL MEDICINE | Facility: CLINIC | Age: 67
End: 2017-08-03

## 2017-08-07 ENCOUNTER — TELEPHONE (OUTPATIENT)
Dept: ENDOSCOPY | Facility: HOSPITAL | Age: 67
End: 2017-08-07

## 2017-08-07 ENCOUNTER — TELEPHONE (OUTPATIENT)
Dept: HEMATOLOGY/ONCOLOGY | Facility: CLINIC | Age: 67
End: 2017-08-07

## 2017-08-07 NOTE — TELEPHONE ENCOUNTER
Spoke with pt on today in regards to appointment for port placement, pt has agreed not to have port placement and continue care at [facility].

## 2017-08-21 ENCOUNTER — HOSPITAL ENCOUNTER (OUTPATIENT)
Dept: VASCULAR SURGERY | Facility: CLINIC | Age: 67
Discharge: HOME | End: 2017-08-21
Attending: SURGERY
Payer: MEDICARE

## 2017-08-21 ENCOUNTER — OFFICE VISIT (OUTPATIENT)
Dept: VASCULAR SURGERY | Facility: CLINIC | Age: 67
End: 2017-08-21
Attending: SURGERY
Payer: MEDICARE

## 2017-08-21 VITALS
DIASTOLIC BLOOD PRESSURE: 73 MMHG | WEIGHT: 131 LBS | HEIGHT: 64 IN | HEART RATE: 93 BPM | SYSTOLIC BLOOD PRESSURE: 107 MMHG | TEMPERATURE: 97 F | BODY MASS INDEX: 22.36 KG/M2

## 2017-08-21 DIAGNOSIS — I80.9 PHLEBITIS AND THROMBOPHLEBITIS OF UNSPECIFIED SITE: ICD-10-CM

## 2017-08-21 DIAGNOSIS — I26.99 OTHER PULMONARY EMBOLISM WITHOUT ACUTE COR PULMONALE, UNSPECIFIED CHRONICITY: Primary | ICD-10-CM

## 2017-08-21 PROCEDURE — 99214 OFFICE O/P EST MOD 30 MIN: CPT | Mod: S$PBB,,, | Performed by: SURGERY

## 2017-08-21 PROCEDURE — 93971 EXTREMITY STUDY: CPT | Mod: 26,S$PBB,, | Performed by: SURGERY

## 2017-08-21 PROCEDURE — 99499 UNLISTED E&M SERVICE: CPT | Mod: S$PBB,,, | Performed by: SURGERY

## 2017-08-21 PROCEDURE — 99999 PR PBB SHADOW E&M-EST. PATIENT-LVL III: CPT | Mod: PBBFAC,,, | Performed by: SURGERY

## 2017-08-21 PROCEDURE — 99213 OFFICE O/P EST LOW 20 MIN: CPT | Mod: PBBFAC,25 | Performed by: SURGERY

## 2017-08-21 RX ORDER — ONDANSETRON HYDROCHLORIDE 8 MG/1
TABLET, FILM COATED ORAL
Status: ON HOLD | COMMUNITY
Start: 2017-08-18 | End: 2017-09-02 | Stop reason: HOSPADM

## 2017-08-21 RX ORDER — POLYETHYLENE GLYCOL 3350 17 G/17G
POWDER, FOR SOLUTION ORAL
COMMUNITY
Start: 2017-08-18

## 2017-08-23 DIAGNOSIS — I26.99 PE (PULMONARY THROMBOEMBOLISM): Primary | ICD-10-CM

## 2017-08-23 NOTE — PROGRESS NOTES
Andra Matamoros  7/10/2017    HPI:  Patient is a 66 y.o. female referred from Dr. BONY Parr after presenting to his clinic complaining of about one month's worth of R leg pain and swelling about one month ago.  She denies a history of hypercoagulability, smoking or hormonal supplementation. Her pain and inflammation were mainly localized to the proximal medial right thigh, though she described intermittent shooting pain down right leg and associated right leg swelling.  Duplex at that time showed complete thrombosis of her R GSV from the distal leg to the saphenofemoral junction.  Because of the overall length of the venous thrombosis and proximity to the SFJ, systemic anticoagulation with eliquis was started.      She presents today reporting that her pain has resolved.  She denies inflammation of the leg, leg swelling and leg pain.       She reports that, since her last visit, she has been diagnosed with stage IV cholangiocarcinoma with carcinomatosis, and she has suffered a PE.  She has been transitioned from eiliquis to lovenox for VTE prophylaxis given her recent PE.      She has now sought oncologic care at [facility].       Past Medical History:   Diagnosis Date    Blood clot in vein     right lower extremity    Cancer     unknown source     Past Surgical History:   Procedure Laterality Date     SECTION      CHOLECYSTECTOMY      COLONOSCOPY Left 2017    Procedure: COLONOSCOPY;  Surgeon: David Padilla MD;  Location: [facility] ([address] [place] [address]);  Service: Endoscopy;  Laterality: Left;  Lovenox/OK per Dr Hurt/Dimple/Vascular Surgery for pt to take 1/2 dose Lovenox on 17 in am/she takes 100 mg once daily and will only take 50 mg 17 in AM.    TONSILLECTOMY       Family History   Problem Relation Age of Onset    Breast cancer Sister 60    Cancer Father     Vasculitis Mother     Paget's disease of bone Mother     No Known Problems Brother     No Known Problems  Daughter     No Known Problems Son     Colon cancer Neg Hx     Ovarian cancer Neg Hx      Social History     Social History    Marital status:      Spouse name: N/A    Number of children: N/A    Years of education: N/A     Occupational History    Not on file.     Social History Main Topics    Smoking status: Never Smoker    Smokeless tobacco: Never Used    Alcohol use Yes      Comment: Grand Nguyen garcia    Drug use: No    Sexual activity: Not Currently     Partners: Male     Birth control/ protection: Post-menopausal     Other Topics Concern    Not on file     Social History Narrative    No narrative on file     Current Outpatient Prescriptions on File Prior to Visit   Medication Sig    enoxaparin (LOVENOX) 100 mg/mL Syrg Inject 0.9 mLs (90 mg total) into the skin once daily at 6am.    lipase-protease-amylase 12,000-38,000-60,000 units (CREON) CpDR Take 1 capsule by mouth before meals and at bedtime as needed.    morphine (MS CONTIN) 15 MG 12 hr tablet Take 1 tablet (15 mg total) by mouth 2 (two) times daily.    oxycodone (ROXICODONE) 5 MG immediate release tablet Take 1 tablet (5 mg total) by mouth every 6 (six) hours as needed for Pain.     No current facility-administered medications on file prior to visit.        REVIEW OF SYSTEMS:  General: negative; ENT: negative; Allergy and Immunology: negative; Hematological and Lymphatic: Negative; Endocrine: negative; Respiratory: no cough, shortness of breath, or wheezing; Cardiovascular: no chest pain or dyspnea on exertion; Gastrointestinal: no abdominal pain/back, change in bowel habits, or bloody stools; Genito-Urinary: no dysuria, trouble voiding, or hematuria; Musculoskeletal: negative  Neurological: no TIA or stroke symptoms    PHYSICAL EXAM:   Right Arm BP - Sittin/73 (17 1452)  Left Arm BP - Sittin/57 (17 1452)  Pulse: 93  Temp: 97.1 °F (36.2 °C)      General appearance:  Alert, well-appearing, and in no  distress.  Oriented to person, place, and time   Neurological: Normal speech, no focal findings noted; CN II - XII grossly intact           Musculoskeletal: Digits/nail without cyanosis/clubbing.  Normal muscle strength/tone.                 Neck: Supple, no significant adenopathy; thyroid is not enlarged                  No carotid bruit can be auscultated                Chest:  Clear to auscultation, no wheezes, rales or rhonchi, symmetric air entry     No use of accessory muscles             Cardiac: Normal rate and regular rhythm, S1 and S2 normal; PMI non-displaced          Abdomen: Soft, nontender, nondistended, no masses or organomegaly     No rebound tenderness noted; bowel sounds normal     Pulsatile aortic mass is not palpable.     No groin adenopathy      Extremities:   2+ femoral pulses bilaterally     2+ Popliteal pulses; 2+ pedal pulses palpable.     No ulcerations     R leg : no gross signs of inflammation or ecchymosis     LAB RESULTS:  Lab Results   Component Value Date    K 3.9 07/20/2017    K 4.0 07/19/2017    K 3.6 07/18/2017    CREATININE 0.7 07/20/2017    CREATININE 0.6 07/19/2017    CREATININE 0.6 07/18/2017     Lab Results   Component Value Date    WBC 7.83 07/20/2017    WBC 7.39 07/19/2017    WBC 7.83 07/18/2017    HCT 32.2 (L) 07/20/2017    HCT 34.4 (L) 07/19/2017    HCT 33.1 (L) 07/18/2017     07/20/2017     07/19/2017     07/18/2017     Lab Results   Component Value Date    HGBA1C 5.5 01/17/2017    HGBA1C 5.6 07/16/2010     IMAGING:  Urgent duplex obtained in vascular lab 6/2017: occlusive thrombus throughout entire course of R GSV extending to saphenofemoral junction. No DVT.      (7/10): Right Leg: Color flow evaluation of the lower extremity demonstrates sub-acute superficial thrombus of the GSV from the SFJ to below the knee. All the deep vessels are compressible and free of thrombus.      IMP/PLAN:  66 y.o. female with a history of symptoms as noted above with  thrombophlebitis of the R GSV with extensive clot burden and involvement of the saphenofemoral junction without presence of DVT in 6/2017.  She has now been on ~2.5 months of systemic anticoagulation, initially with eliquis, then transitioned to lovenox in 7/2017 after a diagnosis of stage IV cholangiocarcinoma and subsequent PE while on eliquis.  Given the fact that she had a VTE event while anticoagulated, she will require IVC filter placement.     1) continue systemic anticoagulation as written (lovenox)  2) IVC filter placement given PE while anticoagulated on eliquis (plan for 8/24)  3) follow up with Dr. Parr as scheduled    Som De La Cruz MD  Vascular & Endovascular Surgery

## 2017-08-24 DIAGNOSIS — I26.99 PE (PULMONARY THROMBOEMBOLISM): Primary | ICD-10-CM

## 2017-08-25 ENCOUNTER — SURGERY (OUTPATIENT)
Age: 67
End: 2017-08-25

## 2017-08-25 ENCOUNTER — HOSPITAL ENCOUNTER (OUTPATIENT)
Facility: HOSPITAL | Age: 67
Discharge: HOME | End: 2017-08-25
Attending: SURGERY | Admitting: SURGERY
Payer: MEDICARE

## 2017-08-25 VITALS
HEART RATE: 86 BPM | TEMPERATURE: 98 F | HEIGHT: 63 IN | BODY MASS INDEX: 23.04 KG/M2 | DIASTOLIC BLOOD PRESSURE: 67 MMHG | OXYGEN SATURATION: 98 % | WEIGHT: 130 LBS | RESPIRATION RATE: 16 BRPM | SYSTOLIC BLOOD PRESSURE: 125 MMHG

## 2017-08-25 DIAGNOSIS — I26.99 PE (PULMONARY THROMBOEMBOLISM): ICD-10-CM

## 2017-08-25 PROCEDURE — 37191 INS ENDOVAS VENA CAVA FILTR: CPT | Mod: GC,,, | Performed by: SURGERY

## 2017-08-25 PROCEDURE — 63600175 PHARM REV CODE 636 W HCPCS

## 2017-08-25 PROCEDURE — 25000003 PHARM REV CODE 250

## 2017-08-25 PROCEDURE — C1894 INTRO/SHEATH, NON-LASER: HCPCS

## 2017-08-25 RX ORDER — PROCHLORPERAZINE MALEATE 10 MG
10 TABLET ORAL EVERY 6 HOURS PRN
COMMUNITY

## 2017-08-25 NOTE — OP NOTE
Operative Note    Surgery Date: 08/25/2017    Surgeon(s) and Role:      Som De La Cruz MD - Primary   Camron Norris MD - Assistant    Pre-op Diagnosis:    1. Stage 4 cholangiocarcinoma with carcinomatosis  2. History of DVT/PE while on eliquis    Post-op Diagnosis:  Same    Procedure:  U/S right Fem v. Access  Insertion Bard Treutlen IVC filter    Anesthesia: RN IV Sedation    Findings/Key Components:  Normal IVC anatomy free of thrombus. Suitable for filter placement    Estimated Blood Loss: 5mL    Contrast: 10 mL    Fluoro: 2 min         Specimens    None      Indication: Andra Matamoros is a 66 y.o. female with history of DVT and PE while on eliquis due to her stage 4 cholangiocarcinoma. An IVC filter is being placed to prevent additional PE given that he is at high risk of additional DVT/PE.    Procedure: After ontaining consent, was brought to the cath lab. Time-out was performed. The groins were prepped and draped in standard fashion. Using duplex ultra-sound the left femoral vein was cannulated. The vein was approipriate for access and a recording was placed on the chart.The Bard sheath was placed and an IVC venogram performed showing normal anatomy and no thrombus. The lower renal vein was identified and the filter deployed below it. It appeared to be positioned well with no tilt. Wires and sheaths were removed and pressure held for hemostasis.    Camron Norris MD  Vascular/Endovascular Surgery Fellow

## 2017-08-25 NOTE — BRIEF OP NOTE
Brief Operative Note  Date: 08/25/2017    Surgeon(s) and Role:     * Som De La Cruz MD - Primary    Pre-op Diagnosis:  PE (pulmonary thromboembolism) [I26.99]; while on anticoagulation     Post-op Diagnosis:  Same    Procedure(s):  1) IVC filter placement  2) venogram  3) US guided access R common femoral vein    Surgeon: Som De La Cruz MD  Vascular & Endovascular Surgery       Assistant: ROQUE Norris MD    Anesthesia: RN IV Sedation    Findings/Key Components:  Successful IVC filter placement.      EBL: Minimal         Specimens     None          I attest to being present for the procedure and performing the case.  Som De La Cruz MD  Vascular & Endovascular Surgery     Discharge Note  SUMMARY    Admit Date: 8/25/2017    Attending Physician: Som De La Cruz MD  Vascular & Endovascular Surgery       Discharge Physician: Som De La Cruz MD  Vascular & Endovascular Surgery       Discharge Date: 08/25/2017    Final Diagnosis: PE (pulmonary thromboembolism) [I26.99]    Disposition: Home or self-care    Patient Instructions:   Current Discharge Medication List      CONTINUE these medications which have NOT CHANGED    Details   enoxaparin (LOVENOX) 100 mg/mL Syrg Inject 0.9 mLs (90 mg total) into the skin once daily at 6am.  Qty: 30 Syringe, Refills: 2      morphine (MS CONTIN) 15 MG 12 hr tablet Take 1 tablet (15 mg total) by mouth 2 (two) times daily.  Qty: 60 tablet, Refills: 0    Associated Diagnoses: Neoplasm related pain      ondansetron (ZOFRAN) 8 MG tablet       oxycodone (ROXICODONE) 5 MG immediate release tablet Take 1 tablet (5 mg total) by mouth every 6 (six) hours as needed for Pain.  Qty: 90 tablet, Refills: 0    Associated Diagnoses: Neoplasm related pain      prochlorperazine (COMPAZINE) 10 MG tablet Take 10 mg by mouth every 6 (six) hours as needed.      lipase-protease-amylase 12,000-38,000-60,000 units (CREON) CpDR Take 1 capsule by mouth before meals and at bedtime as needed.  Qty: 150  capsule, Refills: 11    Associated Diagnoses: Pancreatic insufficiency      polyethylene glycol (GLYCOLAX) 17 gram/dose powder              Diet:  Resume pre-operative diet    Activity:  Ad michelle    Follow-up:  Follow-up in clinic with Dr De La Cruz within 1-2 weeks; please call clinic nurse at

## 2017-08-25 NOTE — DISCHARGE SUMMARY
[facility]  Discharge Note  Short Stay    Admit Date: 8/25/2017    Discharge Date and Time: No discharge date for patient encounter.     Attending Physician: Som De La Cruz MD     Discharge Provider: Camron Norris    Diagnoses:  Active Hospital Problems    Diagnosis  POA    *PE (pulmonary thromboembolism) [I26.99]  Yes    Cholangiocarcinoma [C22.1]  Yes      Resolved Hospital Problems    Diagnosis Date Resolved POA   No resolved problems to display.       Discharged Condition: good    Hospital Course: Patient was admitted for an outpatient procedure and tolerated the procedure well with no complications.    Final Diagnoses: Same as principal problem.    Disposition: Home or Self Care    Follow up/Patient Instructions:    Medications:  Reconciled Home Medications:   Current Discharge Medication List      CONTINUE these medications which have NOT CHANGED    Details   enoxaparin (LOVENOX) 100 mg/mL Syrg Inject 0.9 mLs (90 mg total) into the skin once daily at 6am.  Qty: 30 Syringe, Refills: 2      morphine (MS CONTIN) 15 MG 12 hr tablet Take 1 tablet (15 mg total) by mouth 2 (two) times daily.  Qty: 60 tablet, Refills: 0    Associated Diagnoses: Neoplasm related pain      ondansetron (ZOFRAN) 8 MG tablet       oxycodone (ROXICODONE) 5 MG immediate release tablet Take 1 tablet (5 mg total) by mouth every 6 (six) hours as needed for Pain.  Qty: 90 tablet, Refills: 0    Associated Diagnoses: Neoplasm related pain      prochlorperazine (COMPAZINE) 10 MG tablet Take 10 mg by mouth every 6 (six) hours as needed.      lipase-protease-amylase 12,000-38,000-60,000 units (CREON) CpDR Take 1 capsule by mouth before meals and at bedtime as needed.  Qty: 150 capsule, Refills: 11    Associated Diagnoses: Pancreatic insufficiency      polyethylene glycol (GLYCOLAX) 17 gram/dose powder              Discharge Procedure Orders  Diet general     Activity as tolerated     Call MD for:  temperature >100.4     Call MD for:   persistent nausea and vomiting or diarrhea     Call MD for:  severe uncontrolled pain     Call MD for:  redness, tenderness, or signs of infection (pain, swelling, redness, odor or green/yellow discharge around incision site)     Call MD for:  difficulty breathing or increased cough     Call MD for:  severe persistent headache     Call MD for:  worsening rash     Call MD for:  persistent dizziness, light-headedness, or visual disturbances     Remove dressing in 24 hours       Follow-up Information     Som De La Cruz MD.    Specialty:  Vascular Surgery  Why:  As needed  Contact information:  [address]  [facility] [address]  [phone]                   Discharge Procedure Orders (must include Diet, Follow-up, Activity):    Discharge Procedure Orders (must include Diet, Follow-up, Activity)  Diet general     Activity as tolerated     Call MD for:  temperature >100.4     Call MD for:  persistent nausea and vomiting or diarrhea     Call MD for:  severe uncontrolled pain     Call MD for:  redness, tenderness, or signs of infection (pain, swelling, redness, odor or green/yellow discharge around incision site)     Call MD for:  difficulty breathing or increased cough     Call MD for:  severe persistent headache     Call MD for:  worsening rash     Call MD for:  persistent dizziness, light-headedness, or visual disturbances     Remove dressing in 24 hours

## 2017-08-25 NOTE — H&P (VIEW-ONLY)
Andra Matamoros  7/10/2017    HPI:  Patient is a 66 y.o. female referred from Dr. BONY Parr after presenting to his clinic complaining of about one month's worth of R leg pain and swelling about one month ago.  She denies a history of hypercoagulability, smoking or hormonal supplementation. Her pain and inflammation were mainly localized to the proximal medial right thigh, though she described intermittent shooting pain down right leg and associated right leg swelling.  Duplex at that time showed complete thrombosis of her R GSV from the distal leg to the saphenofemoral junction.  Because of the overall length of the venous thrombosis and proximity to the SFJ, systemic anticoagulation with eliquis was started.      She presents today reporting that her pain has resolved.  She denies inflammation of the leg, leg swelling and leg pain.       She reports that, since her last visit, she has been diagnosed with stage IV cholangiocarcinoma with carcinomatosis, and she has suffered a PE.  She has been transitioned from eiliquis to lovenox for VTE prophylaxis given her recent PE.      She has now sought oncologic care at [facility] [place].       Past Medical History:   Diagnosis Date    Blood clot in vein     right lower extremity    Cancer     unknown source     Past Surgical History:   Procedure Laterality Date     SECTION      CHOLECYSTECTOMY      COLONOSCOPY Left 2017    Procedure: COLONOSCOPY;  Surgeon: David Padilla MD;  [facility] [place] [address]);  Service: Endoscopy;  Laterality: Left;  Lovenox/OK per Dr Hurt/Dimple/Vascular Surgery for pt to take 1/2 dose Lovenox on 17 in am/she takes 100 mg once daily and will only take 50 mg 17 in AM.    TONSILLECTOMY       Family History   Problem Relation Age of Onset    Breast cancer Sister 60    Cancer Father     Vasculitis Mother     Paget's disease of bone Mother     No Known Problems Brother     No Known Problems  Daughter     No Known Problems Son     Colon cancer Neg Hx     Ovarian cancer Neg Hx      Social History     Social History    Marital status:      Spouse name: N/A    Number of children: N/A    Years of education: N/A     Occupational History    Not on file.     Social History Main Topics    Smoking status: Never Smoker    Smokeless tobacco: Never Used    Alcohol use Yes      Comment: Grand Nguyen garcia    Drug use: No    Sexual activity: Not Currently     Partners: Male     Birth control/ protection: Post-menopausal     Other Topics Concern    Not on file     Social History Narrative    No narrative on file     Current Outpatient Prescriptions on File Prior to Visit   Medication Sig    enoxaparin (LOVENOX) 100 mg/mL Syrg Inject 0.9 mLs (90 mg total) into the skin once daily at 6am.    lipase-protease-amylase 12,000-38,000-60,000 units (CREON) CpDR Take 1 capsule by mouth before meals and at bedtime as needed.    morphine (MS CONTIN) 15 MG 12 hr tablet Take 1 tablet (15 mg total) by mouth 2 (two) times daily.    oxycodone (ROXICODONE) 5 MG immediate release tablet Take 1 tablet (5 mg total) by mouth every 6 (six) hours as needed for Pain.     No current facility-administered medications on file prior to visit.        REVIEW OF SYSTEMS:  General: negative; ENT: negative; Allergy and Immunology: negative; Hematological and Lymphatic: Negative; Endocrine: negative; Respiratory: no cough, shortness of breath, or wheezing; Cardiovascular: no chest pain or dyspnea on exertion; Gastrointestinal: no abdominal pain/back, change in bowel habits, or bloody stools; Genito-Urinary: no dysuria, trouble voiding, or hematuria; Musculoskeletal: negative  Neurological: no TIA or stroke symptoms    PHYSICAL EXAM:   Right Arm BP - Sittin/73 (17 1452)  Left Arm BP - Sittin/57 (17 1452)  Pulse: 93  Temp: 97.1 °F (36.2 °C)      General appearance:  Alert, well-appearing, and in no  distress.  Oriented to person, place, and time   Neurological: Normal speech, no focal findings noted; CN II - XII grossly intact           Musculoskeletal: Digits/nail without cyanosis/clubbing.  Normal muscle strength/tone.                 Neck: Supple, no significant adenopathy; thyroid is not enlarged                  No carotid bruit can be auscultated                Chest:  Clear to auscultation, no wheezes, rales or rhonchi, symmetric air entry     No use of accessory muscles             Cardiac: Normal rate and regular rhythm, S1 and S2 normal; PMI non-displaced          Abdomen: Soft, nontender, nondistended, no masses or organomegaly     No rebound tenderness noted; bowel sounds normal     Pulsatile aortic mass is not palpable.     No groin adenopathy      Extremities:   2+ femoral pulses bilaterally     2+ Popliteal pulses; 2+ pedal pulses palpable.     No ulcerations     R leg : no gross signs of inflammation or ecchymosis     LAB RESULTS:  Lab Results   Component Value Date    K 3.9 07/20/2017    K 4.0 07/19/2017    K 3.6 07/18/2017    CREATININE 0.7 07/20/2017    CREATININE 0.6 07/19/2017    CREATININE 0.6 07/18/2017     Lab Results   Component Value Date    WBC 7.83 07/20/2017    WBC 7.39 07/19/2017    WBC 7.83 07/18/2017    HCT 32.2 (L) 07/20/2017    HCT 34.4 (L) 07/19/2017    HCT 33.1 (L) 07/18/2017     07/20/2017     07/19/2017     07/18/2017     Lab Results   Component Value Date    HGBA1C 5.5 01/17/2017    HGBA1C 5.6 07/16/2010     IMAGING:  Urgent duplex obtained in vascular lab 6/2017: occlusive thrombus throughout entire course of R GSV extending to saphenofemoral junction. No DVT.      (7/10): Right Leg: Color flow evaluation of the lower extremity demonstrates sub-acute superficial thrombus of the GSV from the SFJ to below the knee. All the deep vessels are compressible and free of thrombus.      IMP/PLAN:  66 y.o. female with a history of symptoms as noted above with  thrombophlebitis of the R GSV with extensive clot burden and involvement of the saphenofemoral junction without presence of DVT in 6/2017.  She has now been on ~2.5 months of systemic anticoagulation, initially with eliquis, then transitioned to lovenox in 7/2017 after a diagnosis of stage IV cholangiocarcinoma and subsequent PE while on eliquis.  Given the fact that she had a VTE event while anticoagulated, she will require IVC filter placement.     1) continue systemic anticoagulation as written (lovenox)  2) IVC filter placement given PE while anticoagulated on eliquis (plan for 8/24)  3) follow up with Dr. Parr as scheduled    Som De La Cruz MD  Vascular & Endovascular Surgery

## 2017-08-25 NOTE — NURSING
Pt on unit via [facility].  Report from Kayla CISNEROS.  Awake alert and oriented.  Denies pain or SOB.  VSS.  Tegaderm/gauze dressing to right groin clean dry and intact.  Will continue to monitor.  Spouse called to bedside.  Tolerating fluids and sandwich.

## 2017-08-25 NOTE — NURSING
Pt eating and drinking fluids.  No c/o pain.   at bedside.  VSS.  Dressing to right groin remains clean dry and intact.

## 2017-09-01 ENCOUNTER — HOSPITAL ENCOUNTER (INPATIENT)
Facility: HOSPITAL | Age: 67
LOS: 1 days | Discharge: HOME | DRG: 064 | End: 2017-09-02
Attending: EMERGENCY MEDICINE | Admitting: PSYCHIATRY & NEUROLOGY
Payer: MEDICARE

## 2017-09-01 ENCOUNTER — TELEPHONE (OUTPATIENT)
Dept: INTERNAL MEDICINE | Facility: CLINIC | Age: 67
End: 2017-09-01

## 2017-09-01 DIAGNOSIS — I63.89 CEREBROVASCULAR ACCIDENT (CVA) DUE TO OTHER MECHANISM: Primary | ICD-10-CM

## 2017-09-01 DIAGNOSIS — I48.91 ATRIAL FIBRILLATION: ICD-10-CM

## 2017-09-01 DIAGNOSIS — I63.89 CEREBRAL INFARCTION, WATERSHED DISTRIBUTION, BILATERAL, ACUTE: ICD-10-CM

## 2017-09-01 PROBLEM — R20.0 RIGHT FACIAL NUMBNESS: Status: ACTIVE | Noted: 2017-09-01

## 2017-09-01 PROBLEM — I63.9 CEREBROVASCULAR ACCIDENT (CVA): Status: ACTIVE | Noted: 2017-09-01

## 2017-09-01 LAB
ALBUMIN SERPL BCP-MCNC: 2.6 G/DL
ALP SERPL-CCNC: 1311 U/L
ALT SERPL W/O P-5'-P-CCNC: 67 U/L
ANION GAP SERPL CALC-SCNC: 9 MMOL/L
ANISOCYTOSIS BLD QL SMEAR: SLIGHT
AST SERPL-CCNC: 79 U/L
BASOPHILS # BLD AUTO: ABNORMAL K/UL
BASOPHILS NFR BLD: 1 %
BILIRUB SERPL-MCNC: 0.5 MG/DL
BILIRUB UR QL STRIP: NEGATIVE
BUN SERPL-MCNC: 18 MG/DL
CALCIUM SERPL-MCNC: 8.6 MG/DL
CHLORIDE SERPL-SCNC: 103 MMOL/L
CHOLEST SERPL-MCNC: 169 MG/DL
CHOLEST/HDLC SERPL: 3.3 {RATIO}
CLARITY UR REFRACT.AUTO: CLEAR
CO2 SERPL-SCNC: 24 MMOL/L
COLOR UR AUTO: YELLOW
CREAT SERPL-MCNC: 0.5 MG/DL (ref 0.5–1.4)
CREAT SERPL-MCNC: 0.6 MG/DL
DIFFERENTIAL METHOD: ABNORMAL
EOSINOPHIL # BLD AUTO: ABNORMAL K/UL
EOSINOPHIL NFR BLD: 0 %
ERYTHROCYTE [DISTWIDTH] IN BLOOD BY AUTOMATED COUNT: 17.1 %
EST. GFR  (AFRICAN AMERICAN): >60 ML/MIN/1.73 M^2
EST. GFR  (NON AFRICAN AMERICAN): >60 ML/MIN/1.73 M^2
GLUCOSE SERPL-MCNC: 103 MG/DL
GLUCOSE UR QL STRIP: NEGATIVE
HCT VFR BLD AUTO: 29.8 %
HDLC SERPL-MCNC: 52 MG/DL
HDLC SERPL: 30.8 %
HGB BLD-MCNC: 9.8 G/DL
HGB UR QL STRIP: NEGATIVE
INR PPP: 1
KETONES UR QL STRIP: NEGATIVE
LDLC SERPL CALC-MCNC: 94.6 MG/DL
LEUKOCYTE ESTERASE UR QL STRIP: NEGATIVE
LYMPHOCYTES # BLD AUTO: ABNORMAL K/UL
LYMPHOCYTES NFR BLD: 4 %
MCH RBC QN AUTO: 29 PG
MCHC RBC AUTO-ENTMCNC: 32.9 G/DL
MCV RBC AUTO: 88 FL
METAMYELOCYTES NFR BLD MANUAL: 2 %
MONOCYTES # BLD AUTO: ABNORMAL K/UL
MONOCYTES NFR BLD: 7 %
MYELOCYTES NFR BLD MANUAL: 1 %
NEUTROPHILS NFR BLD: 80 %
NEUTS BAND NFR BLD MANUAL: 5 %
NITRITE UR QL STRIP: NEGATIVE
NONHDLC SERPL-MCNC: 117 MG/DL
OVALOCYTES BLD QL SMEAR: ABNORMAL
PH UR STRIP: 6 [PH] (ref 5–8)
PLATELET # BLD AUTO: 353 K/UL
PMV BLD AUTO: 9 FL
POC PTINR: 1.1 (ref 0.9–1.2)
POC PTWBT: 13.4 SEC (ref 9.7–14.3)
POCT GLUCOSE: 93 MG/DL (ref 70–110)
POIKILOCYTOSIS BLD QL SMEAR: SLIGHT
POLYCHROMASIA BLD QL SMEAR: ABNORMAL
POTASSIUM SERPL-SCNC: 4 MMOL/L
PROT SERPL-MCNC: 6.6 G/DL
PROT UR QL STRIP: NEGATIVE
PROTHROMBIN TIME: 11 SEC
RBC # BLD AUTO: 3.38 M/UL
SAMPLE: NORMAL
SAMPLE: NORMAL
SODIUM SERPL-SCNC: 136 MMOL/L
SP GR UR STRIP: >1.03 (ref 1–1.03)
TRIGL SERPL-MCNC: 112 MG/DL
TSH SERPL DL<=0.005 MIU/L-ACNC: 1.36 UIU/ML
URN SPEC COLLECT METH UR: ABNORMAL
UROBILINOGEN UR STRIP-ACNC: NEGATIVE EU/DL
WBC # BLD AUTO: 23.1 K/UL

## 2017-09-01 PROCEDURE — 99285 EMERGENCY DEPT VISIT HI MDM: CPT | Mod: 25

## 2017-09-01 PROCEDURE — G8997 SWALLOW GOAL STATUS: HCPCS | Mod: CH

## 2017-09-01 PROCEDURE — 80061 LIPID PANEL: CPT

## 2017-09-01 PROCEDURE — 25500020 PHARM REV CODE 255: Performed by: PSYCHIATRY & NEUROLOGY

## 2017-09-01 PROCEDURE — 82565 ASSAY OF CREATININE: CPT

## 2017-09-01 PROCEDURE — 92523 SPEECH SOUND LANG COMPREHEN: CPT

## 2017-09-01 PROCEDURE — 93010 ELECTROCARDIOGRAM REPORT: CPT | Mod: ,,, | Performed by: INTERNAL MEDICINE

## 2017-09-01 PROCEDURE — 81003 URINALYSIS AUTO W/O SCOPE: CPT

## 2017-09-01 PROCEDURE — 25500020 PHARM REV CODE 255: Performed by: EMERGENCY MEDICINE

## 2017-09-01 PROCEDURE — 85027 COMPLETE CBC AUTOMATED: CPT

## 2017-09-01 PROCEDURE — A4216 STERILE WATER/SALINE, 10 ML: HCPCS | Performed by: STUDENT IN AN ORGANIZED HEALTH CARE EDUCATION/TRAINING PROGRAM

## 2017-09-01 PROCEDURE — 99285 EMERGENCY DEPT VISIT HI MDM: CPT | Mod: ,,, | Performed by: EMERGENCY MEDICINE

## 2017-09-01 PROCEDURE — A9585 GADOBUTROL INJECTION: HCPCS | Performed by: PSYCHIATRY & NEUROLOGY

## 2017-09-01 PROCEDURE — 92610 EVALUATE SWALLOWING FUNCTION: CPT

## 2017-09-01 PROCEDURE — 85007 BL SMEAR W/DIFF WBC COUNT: CPT

## 2017-09-01 PROCEDURE — 25000003 PHARM REV CODE 250: Performed by: STUDENT IN AN ORGANIZED HEALTH CARE EDUCATION/TRAINING PROGRAM

## 2017-09-01 PROCEDURE — 85610 PROTHROMBIN TIME: CPT

## 2017-09-01 PROCEDURE — 93005 ELECTROCARDIOGRAM TRACING: CPT

## 2017-09-01 PROCEDURE — 80053 COMPREHEN METABOLIC PANEL: CPT

## 2017-09-01 PROCEDURE — 84443 ASSAY THYROID STIM HORMONE: CPT

## 2017-09-01 PROCEDURE — 82962 GLUCOSE BLOOD TEST: CPT

## 2017-09-01 PROCEDURE — 99900035 HC TECH TIME PER 15 MIN (STAT)

## 2017-09-01 PROCEDURE — G8996 SWALLOW CURRENT STATUS: HCPCS | Mod: CH

## 2017-09-01 PROCEDURE — 20600001 HC STEP DOWN PRIVATE ROOM

## 2017-09-01 RX ORDER — ATORVASTATIN CALCIUM 20 MG/1
40 TABLET, FILM COATED ORAL DAILY
Status: DISCONTINUED | OUTPATIENT
Start: 2017-09-02 | End: 2017-09-02 | Stop reason: HOSPADM

## 2017-09-01 RX ORDER — ENOXAPARIN SODIUM 100 MG/ML
1.5 INJECTION SUBCUTANEOUS EVERY 24 HOURS
Status: DISCONTINUED | OUTPATIENT
Start: 2017-09-01 | End: 2017-09-02

## 2017-09-01 RX ORDER — POLYETHYLENE GLYCOL 3350 17 G/17G
17 POWDER, FOR SOLUTION ORAL DAILY
Status: DISCONTINUED | OUTPATIENT
Start: 2017-09-02 | End: 2017-09-02 | Stop reason: HOSPADM

## 2017-09-01 RX ORDER — PROCHLORPERAZINE MALEATE 10 MG
10 TABLET ORAL 3 TIMES DAILY PRN
Status: DISCONTINUED | OUTPATIENT
Start: 2017-09-01 | End: 2017-09-02 | Stop reason: HOSPADM

## 2017-09-01 RX ORDER — LABETALOL HYDROCHLORIDE 5 MG/ML
10 INJECTION, SOLUTION INTRAVENOUS
Status: DISCONTINUED | OUTPATIENT
Start: 2017-09-01 | End: 2017-09-02 | Stop reason: HOSPADM

## 2017-09-01 RX ORDER — GADOBUTROL 604.72 MG/ML
6 INJECTION INTRAVENOUS
Status: COMPLETED | OUTPATIENT
Start: 2017-09-01 | End: 2017-09-01

## 2017-09-01 RX ORDER — SODIUM CHLORIDE 0.9 % (FLUSH) 0.9 %
3 SYRINGE (ML) INJECTION EVERY 8 HOURS
Status: DISCONTINUED | OUTPATIENT
Start: 2017-09-01 | End: 2017-09-02 | Stop reason: HOSPADM

## 2017-09-01 RX ORDER — OXYCODONE HYDROCHLORIDE 5 MG/1
5 TABLET ORAL EVERY 6 HOURS PRN
Status: DISCONTINUED | OUTPATIENT
Start: 2017-09-01 | End: 2017-09-02 | Stop reason: HOSPADM

## 2017-09-01 RX ORDER — ONDANSETRON 8 MG/1
8 TABLET, ORALLY DISINTEGRATING ORAL EVERY 12 HOURS PRN
Status: DISCONTINUED | OUTPATIENT
Start: 2017-09-01 | End: 2017-09-02 | Stop reason: HOSPADM

## 2017-09-01 RX ORDER — MORPHINE SULFATE 15 MG/1
15 TABLET, FILM COATED, EXTENDED RELEASE ORAL EVERY 12 HOURS
Status: DISCONTINUED | OUTPATIENT
Start: 2017-09-01 | End: 2017-09-02 | Stop reason: HOSPADM

## 2017-09-01 RX ORDER — ASPIRIN 325 MG
325 TABLET, DELAYED RELEASE (ENTERIC COATED) ORAL DAILY
Status: DISCONTINUED | OUTPATIENT
Start: 2017-09-02 | End: 2017-09-02 | Stop reason: HOSPADM

## 2017-09-01 RX ADMIN — SODIUM CHLORIDE, PRESERVATIVE FREE 3 ML: 5 INJECTION INTRAVENOUS at 09:09

## 2017-09-01 RX ADMIN — IOHEXOL 75 ML: 350 INJECTION, SOLUTION INTRAVENOUS at 01:09

## 2017-09-01 RX ADMIN — MORPHINE SULFATE 15 MG: 15 TABLET, EXTENDED RELEASE ORAL at 08:09

## 2017-09-01 RX ADMIN — GADOBUTROL 6 ML: 604.72 INJECTION INTRAVENOUS at 11:09

## 2017-09-01 RX ADMIN — PANCRELIPASE 1 CAPSULE: 60000; 12000; 38000 CAPSULE, DELAYED RELEASE PELLETS ORAL at 08:09

## 2017-09-01 NOTE — H&P
See Consult note.  Hem/Onc consulted, but no acute management per their service so Vascular Neurology will be primary.    PEDRO LUIS Sotelo MD  PGY2, Neurology  Pager:  [phone]

## 2017-09-01 NOTE — HPI
65 yo R-handed F with PMHx of liver mass with 07/19/17 pathology concerning for adenocarcinoma (on 3 chemotherapy agents at [facility], [place]), type II NSTEMI 07/12/17, PE with IVC filter placed 08/25/17 and on lovenox (taken this am) presents to [facility] ED 09/01/17 with complaint of R facial numbness and R hand clumsiness starting at approximately 9 am.  Notes that she has had approximately 2-3 weeks of blurred vision in the R eye only as well.  This am, blurred vision was present in the R eye followed by R facial numbness then she noticed the R hand clumsiness.  Describes R facial numbness as numbness between upper lip and nose as well as mid-cheek.  She noticed this then a few minutes later went to write and could not hold the pen between her index finger and thumb, describes being able to write with the pen but writing being scribbled.  Otherwise denies numbness, weakness, speech difficulty, or visual field deficits.  No change in medications this am, no recent fever/chills, n/v/c/d, CP/palpitations, SOB/cough.

## 2017-09-01 NOTE — PT/OT/SLP EVAL
"Physical Therapy  Evaluation    Andra Matamoros   MRN: [id number]   Admitting Diagnosis: CVA  PT Received On: 17  PT Start Time: 1638     PT Stop Time: 1652    PT Total Time (min): 14 min       Billable Minutes:  Evaluation 14    Diagnosis: <principal problem not specified>    Comorbidities and personal factors that affect the PT plan of care or the patient's ability to participation or progress with therapy:  1. Arthritis bilateral hands  2. Bilateral PEs 17  3. Liver CA    Clinical Presentation: stable and/or uncomplicated  Level of Complexity:   Low Complexity  · No personal factors or comorbidities that impact the plan of care  · Examination addressing 1-2 body structures and functions, activity limitations, and/or participation restrictions  · Clinical presentation with stable or uncomplicated characteristics    Past Medical History:   Diagnosis Date    Blood clot in vein     right lower extremity    Cancer     unknown source    Cerebrovascular accident (CVA) 2017      Past Surgical History:   Procedure Laterality Date     SECTION      CHOLECYSTECTOMY      COLONOSCOPY Left 2017    Procedure: COLONOSCOPY;  Surgeon: David Padilla MD;  Location: [facility] ([address] [place] [address]);  Service: Endoscopy;  Laterality: Left;  Lovenox/OK per Dr Hurt/Dimple/Vascular Surgery for pt to take 1/2 dose Lovenox on 17 in am/she takes 100 mg once daily and will only take 50 mg 17 in AM.    TONSILLECTOMY         Referring physician: Kelsey Sotelo MD  Date referred to PT: 2017    General Precautions: Standard, aspiration, fall    Patient History:  Living Environment Comment: Pt lives in a 2 story home in [facility]. [place] with her .  She was independent prior to admit.   Equipment Currently Used at Home: none    Subjective:  Communicated with RN prior to session.  "I couldn't write this morning when I was tryin gto fill out a form."  Chief Complaint: unable to write  Patient goals: " return to PLOF    Pain/Comfort  Pain Rating 1: 0/10  Pain Rating Post-Intervention 1: 0/10      Objective:   Patient found with: telemetry, pulse ox (continuous)   Patient found supine in bed with  at bedside. She agreed to participate.       EXAMINATION    Cognitive Function:  Oriented to: Person, Place, Time and Situation  Level of Alertness: awake and alert  Follows Commands/attention: Follows multistep  commands  Communication: clear/fluent  Safety awareness/insight to disability: intact    Musculoskeletal System  Upper Extremeties  RUE paresis    Lower Extremities:  Range of Motion:  Right Lower Extremity: WNL  Left Lower Extremity: WN:    Strength:  Right Lower Extremity: hip flexion 4/5, knee extension 4/5 with prolonged hold, knee flex 4/5, ankle Df 5/5  Left Lower Extremity: grossly 5/5 in all major muscle groups    Muscular Tone:normal with no clonus     Integumentary System:  Skin integrity: Visible skin intact    Cardiopulmonary System:  Edema: None noted     Neuromuscular System:  Sensation:   Light Touch (LT): intact bilateral UE and LE  Deep Pressure (DP): intact   Proprioception: NT    Coordination:   Finger to nose: impaired RUE, slowed but no significant dysmetria  Finger to thumb opposition : impaired RUE, slowed    Balance:   Static Sit: GOOD+: Takes MAXIMAL challenges from all directions.  ; good midline orientation   Dynamic Sit: GOOD+: Maintains balance through MAXIMAL excursions of active trunk motion;   Static Stand: GOOD+: Takes MAXIMAL challenges from all directions;   Dynamic stand: GOOD: Needs SUPERVISION only during gait and able to self right with moderate ;     Posture and gross symmetry: Rounded shoulder and Head forward    FUNCTIONAL MOBILITY ASSESSMENT:  Bed Mobility:  Rolling/Turning R: independent   Rolling/Turning L: independent   Supine to sit: independent   Sit to supine: independent    Scooting EOB: independent      Transfers:  Sit to stand transfer: stand by assist      Gait:   Pt ambulated 200 feet with close supervision and some gait deviations.  Pt demonstrated decreased velocity of RLE during swing with excessive pelvic rotation to accomplish step length and minimal path deviation.      Dynamic gait activities:  Tandem walking x 10 feet with LOB (min assist), difficulty placing RLE in tandem, and decreased stability in R single leg stance.   Retro walking x 10 feet: decreased speed but no LOB and good momentum control  Walking at a fast speed x 30 feet with improved gait pattern.      THERAPEUTIC ACTIVITIES AND EXERCISES:  Therapist educated patient on the following:  · Role of PT, POC  · Assistance with OOB mobility  · PT recommendations.       AM-PAC 6 CLICK MOBILITY  How much help from another person does this patient currently need?   1 = Unable, Total/Dependent Assistance  2 = A lot, Maximum/Moderate Assistance  3 = A little, Minimum/Contact Guard/Supervision  4 = None, Modified [place]/Independent    Turning over in bed (including adjusting bedclothes, sheets and blankets)?: 4  Sitting down on and standing up from a chair with arms (e.g., wheelchair, bedside commode, etc.): 4  Moving from lying on back to sitting on the side of the bed?: 4  Moving to and from a bed to a chair (including a wheelchair)?: 4  Need to walk in hospital room?: 3  Climbing 3-5 steps with a railing?: 3  Total Score: 22     AM-PAC Raw Score CMS G-Code Modifier Level of Impairment Assistance   6 % Total / Unable   7 - 9 CM 80 - 100% Maximal Assist   10 - 14 CL 60 - 80% Moderate Assist   15 - 19 CK 40 - 60% Moderate Assist   20 - 22 CJ 20 - 40% Minimal Assist   23 CI 1-20% SBA / CGA   24 CH 0% Independent/ Mod I     Patient left supine with all lines intact, call button in reach and RN notified.    Assessment:   Andra Matamoros is a 66 y.o. female with a medical diagnosis of stroke and presents with decreased RUE strength, gait deviations, and decreased balance.  She participated  well in therapy evaluation.  Patient was stand by assist with bed mobility, transfers and gait.  She demonstrated minimal instability with higher level balance activities, but is safe to ambulate with one person supervision/assist.  Pt is safe to d/c home with family support.  Recommend f/u with OP PT to address balance deficits.     Rehab identified problem list/impairments: Rehab identified problem list/impairments: weakness, gait instability, impaired balance    Rehab potential is good.    Activity tolerance: Good    Discharge recommendations: Discharge Facility/Level Of Care Needs: outpatient PT     Barriers to discharge: Barriers to Discharge: None    Equipment recommendations: Equipment Needed After Discharge: none     GOALS:    Physical Therapy Goals        Problem: Physical Therapy Goal    Goal Priority Disciplines Outcome Goal Variances Interventions   Physical Therapy Goal     PT/OT, PT Ongoing (interventions implemented as appropriate)     Description:  Goals to be met by: 2017     Patient will increase functional independence with mobility by performin. Gait  x 200 feet with [place] and no LOB or path deviation with no assistive device.   2. Ascend/descend 1 flight of stairs with 1 hand rail and stand by assist to access home environment.   3. Patient will perform dynamic gait activities with no instability or LOB.                     PLAN:    Patient to be seen 4 x/week to address the above listed problems via gait training, therapeutic activities, therapeutic exercises, neuromuscular re-education  Plan of Care expires: 10/01/17  Plan of Care reviewed with: patient, spouse          Yoly Jacobo, PT  2017

## 2017-09-01 NOTE — ED PROVIDER NOTES
"Encounter Date: 2017    SCRIBE #1 NOTE: I, Naheed Son, am scribing for, and in the presence of,  Dr. Cheatham. I have scribed the following portions of the note - the EKG reading. Other sections scribed: HPI, ROS.       History     Chief Complaint   Patient presents with    Numbness     numbess right side of face from tip lip up to nose and blurred vision in right eye began at 8am.  on chemo. episodes "just happen" sporadically. blurred vision has happened before but never had numbness. trouble writing this morning.      Time patient was seen by the provider: 11:20 AM      The patient is a 66 y.o. female with hx of: PE, cholangiocarcinoma on chemo that presents to the ED with a complaint of numbness and blurred vision. Pt noted R blurred vision and numbness of R portion of her face in the region of her upper lip and nose. Pt is R hand dominant. At 8am, pt had difficulty signing her name as she was having difficulty handling the pen. No headache, N/V, pallor, other extremity weakness.      The history is provided by the patient.     Review of patient's allergies indicates:  No Known Allergies  Past Medical History:   Diagnosis Date    Blood clot in vein     right lower extremity    Cancer     unknown source     Past Surgical History:   Procedure Laterality Date     SECTION      CHOLECYSTECTOMY      COLONOSCOPY Left 2017    Procedure: COLONOSCOPY;  Surgeon: David Padilla MD;  Location: [address]);  Service: Endoscopy;  Laterality: Left;  Lovenox/OK per Dr Hurt/Dimple/Vascular Surgery for pt to take 1/2 dose Lovenox on 17 in am/she takes 100 mg once daily and will only take 50 mg 17 in AM.    TONSILLECTOMY       Family History   Problem Relation Age of Onset    Breast cancer Sister 60    Cancer Father     Vasculitis Mother     Paget's disease of bone Mother     No Known Problems Brother     No Known Problems Daughter     No Known Problems Son     " Colon cancer Neg Hx     Ovarian cancer Neg Hx      Social History   Substance Use Topics    Smoking status: Never Smoker    Smokeless tobacco: Never Used    Alcohol use Yes      Comment: [place] periodically     Review of Systems   Constitutional: Negative for fever.   HENT: Negative for nosebleeds.    Eyes: Positive for visual disturbance.   Respiratory: Negative for shortness of breath.    Cardiovascular: Negative for chest pain.   Gastrointestinal: Negative for nausea and vomiting.   Genitourinary: Negative for hematuria.   Musculoskeletal: Negative for neck stiffness.   Skin: Negative for pallor.   Neurological: Positive for numbness. Negative for headaches.       Physical Exam     Initial Vitals [09/01/17 1112]   BP Pulse Resp Temp SpO2   (!) 120/58 95 17 97.7 °F (36.5 °C) 97 %      MAP       78.67         Physical Exam    Nursing note and vitals reviewed.  Constitutional: She does not have a sickly appearance. She appears ill. No distress.   HENT:   Head: Normocephalic and atraumatic.   Tongue in midline   Eyes: Conjunctivae, EOM and lids are normal.   Cardiovascular: Normal rate and regular rhythm.   Pulmonary/Chest: No accessory muscle usage. No tachypnea. No respiratory distress.   Abdominal: Normal appearance. She exhibits no distension. There is no tenderness.   Musculoskeletal: Normal range of motion.   Neurological: She is alert and oriented to person, place, and time. A cranial nerve deficit is present. GCS eye subscore is 4. GCS verbal subscore is 5. GCS motor subscore is 6.   Right hand weakness         ED Course   Procedures  Labs Reviewed   CBC W/ AUTO DIFFERENTIAL - Abnormal; Notable for the following:        Result Value    WBC 23.10 (*)     RBC 3.38 (*)     Hemoglobin 9.8 (*)     Hematocrit 29.8 (*)     RDW 17.1 (*)     Platelets 353 (*)     MPV 9.0 (*)     All other components within normal limits   COMPREHENSIVE METABOLIC PANEL   PROTIME-INR   TSH   LIPID PANEL   POCT GLUCOSE   POCT  GLUCOSE   ISTAT PROCEDURE   ISTAT CREATININE     EKG Readings: (Independently Interpreted)   86. NSR. No STEMI          Medical Decision Making:   History:   Old Medical Records: I decided to obtain old medical records.  Initial Assessment:   11:20 AM   Stroke code called. Pt brought immediately to CT. Spoke with vascular neurology team  Clinical Tests:   Lab Tests: Ordered and Reviewed  Radiological Study: Ordered and Reviewed  Medical Tests: Ordered and Reviewed            Scribe Attestation:   Scribe #1: I performed the above scribed service and the documentation accurately describes the services I performed. I attest to the accuracy of the note.    Attending Attestation:           Physician Attestation for Scribe:  Physician Attestation Statement for Scribe #1: I, Dr. Cheatham, reviewed documentation, as scribed by Naheed Son in my presence, and it is both accurate and complete.         Attending ED Notes:   Patient presenting to the ED with facial numbness and weakness of her right hand evaluated in the ED for a possible stroke; the Neurovascular team saw the patient and will admit the patient to their service          ED Course      Clinical Impression:   The encounter diagnosis was Cerebrovascular accident (CVA) due to other mechanism.    Disposition:   Disposition: Admitted  Condition: Serious                        Ron Cheatham MD  09/18/17 2029

## 2017-09-01 NOTE — TELEPHONE ENCOUNTER
"Pt called in & stated "I have been having blurry vision but it goes away. Right now my right side of my face is numb from the lip to the nose." I  Encouraged  Her to come to the ER by ambulance & pt stated her  would bring her in." Pt verbalized understanding.  "

## 2017-09-01 NOTE — PLAN OF CARE
Problem: SLP Goal  Goal: SLP Goal  Short Term Goals:   1. Pt will participate in a bedside swallow study to determine the safest and least restrictive possible po diet with possible updated goals to follow pending results. -MET  2. Pt will participate in a speech, language, and cognitive evaluation with possible updated goals to follow pending results. -MET    Outcome: Outcome(s) achieved Date Met: 09/01/17  Bedside Swallow Study and Uzfwds-Istgciqw-Qhxpzrafi Evaluation completed. Pt presented with no swallowing, speech, language, or cognitive deficits. Recommend: regular diet, thin liquids, universal aspiration precautions. No further skilled ST services warranted at this time. Please re-consult as needed.   CHRISTIANO Mcnally., CCC-SLP  Pager: [phone]  09/01/2017

## 2017-09-01 NOTE — CONSULTS
Inpatient consult to Physical Medicine Rehab  Consult performed by: JACLYN ROSS  Consult ordered by: ZORAN DIOP  Reason for consult: assess rehab needs        Reviewed patient history and current admission.  Rehab team following.  Full consult to follow.    TYRONE Peña, FNP-C  Physical Medicine & Rehabilitation   09/01/2017  Spectralink: [id number]

## 2017-09-01 NOTE — PLAN OF CARE
Problem: Physical Therapy Goal  Goal: Physical Therapy Goal  Goals to be met by: 2017     Patient will increase functional independence with mobility by performin. Gait  x 200 feet with [place] and no LOB or path deviation with no assistive device.   2. Ascend/descend 1 flight of stairs with 1 hand rail and stand by assist to access home environment.   3. Patient will perform dynamic gait activities with no instability or LOB.   Outcome: Ongoing (interventions implemented as appropriate)  Initial eval completed Results, POC and goals discussed with patient.

## 2017-09-01 NOTE — ED TRIAGE NOTES
"Andra Matamoros, a 66 y.o. female presents to the ED c/o numbness to R side of face from upper right lip to nose to right cheek.  Pt also c.o blurred vision since chemo started 3 weeks ago.  Lst chemo tx 2 weeks ago for liver cancer stage IV.      Chief Complaint   Patient presents with    Numbness     numbess right side of face from tip lip up to nose and blurred vision in right eye began at 8am.  on chemo. episodes "just happen" sporadically. blurred vision has happened before but never had numbness. trouble writing this morning.      Review of patient's allergies indicates:  No Known Allergies  Past Medical History:   Diagnosis Date    Blood clot in vein     right lower extremity    Cancer     unknown source           "

## 2017-09-01 NOTE — CONSULTS
[facility]  Vascular Neurology  Comprehensive Stroke Center  Consult Note    Inpatient consult to Vascular (Stroke) Neurology  Consult performed by: ZORAN DIOP  Consult ordered by: GERBER MAKI        Assessment/Plan:     Patient is a 66 y.o. year old female with PMHx of type II NSTEMI 07/12/17, PE with IVC filter placed 08/25/17 and on lovenox since 07/14/17, and adenocarcinoma on gemcitabine/cisplatin/paclitaxel presents with R facial numbness and R hand clumsiness since 09:00 09/01/17.    Right facial numbness, R hand clumsiness    -Onset approx 09/10/17 09:00, followed by R hand clumsiness.  Pt had taken lovenox 09/01/17.  -NIHSS 1.  CT head with no signs of acute infarct or hemorrhage.  CTA head and neck normal.  -RFs:  Hypercoagulable state 2/2 malignancy and chemotherapy  -Lipid profile, TSH, CMP, Hgb A1c pending.    -On telemetry with no A fib noted.  Recommend 2D Echo.  -CTA head and neck pending.  Recommend MRI Brain w/ w/o contrast.     Thrombolysis Candidate? No  1. Contraindications: LMWH within the previous 24 hours (prophylactic doses or treatment doses)  2. Warnings: Prior anticoagulant use prior to admission (even if INR < 1.7) (3-4.5 hours contraindication)     Interventional Revascularization Candidate?  No; No large vessel occlusion    Research Candidate? No    Subjective:     History of Present Illness:  65 yo R-handed F with PMHx of liver mass with 07/19/17 pathology concerning for adenocarcinoma (on 3 chemotherapy agents at [facility]), type II NSTEMI 07/12/17, PE with IVC filter placed 08/25/17 and on lovenox (taken this am) presents to [facility] ED 09/01/17 with complaint of R facial numbness and R hand clumsiness starting at approximately 9 am.  Notes that she has had approximately 2-3 weeks of blurred vision in the R eye only as well.  This am, blurred vision was present in the R eye followed by R facial numbness then she noticed the R hand  clumsiness.  Describes R facial numbness as numbness between upper lip and nose as well as mid-cheek.  She noticed this then a few minutes later went to write and could not hold the pen between her index finger and thumb, describes being able to write with the pen but writing being scribbled.  Otherwise denies numbness, weakness, speech difficulty, or visual field deficits.  No change in medications this am, no recent fever/chills, n/v/c/d, CP/palpitations, SOB/cough.      Daughter arrived at 1 pm and able to relay that patient is on 3 chemotherapeutic agents--gemcitabine, cisplatin, and paclitaxel.  Of note, patient also received filgrastim injection at [facility] to increase WBCs--has leukocytosis to ~ 23 k/uL on admission today.    Past Medical History:   Diagnosis Date    Blood clot in vein     right lower extremity    Cancer     unknown source     Past Surgical History:   Procedure Laterality Date     SECTION      CHOLECYSTECTOMY      COLONOSCOPY Left 2017    Procedure: COLONOSCOPY;  Surgeon: David Padilla MD;  Location: [facility] ([address] [place] [address]);  Service: Endoscopy;  Laterality: Left;  Lovenox/OK per Dr Hurt/Dimple/Vascular Surgery for pt to take 1/2 dose Lovenox on 17 in am/she takes 100 mg once daily and will only take 50 mg 17 in AM.    TONSILLECTOMY       Family History   Problem Relation Age of Onset    Breast cancer Sister 60    Cancer Father     Vasculitis Mother     Paget's disease of bone Mother     No Known Problems Brother     No Known Problems Daughter     No Known Problems Son     Colon cancer Neg Hx     Ovarian cancer Neg Hx      Social History   Substance Use Topics    Smoking status: Never Smoker    Smokeless tobacco: Never Used    Alcohol use Yes      Comment: [place] periodically     Review of patient's allergies indicates:  No Known Allergies  Medications: I have reviewed the current medication administration record.    Review of Systems    Constitutional: Negative for chills and fever.   Respiratory: Negative for cough and shortness of breath.    Cardiovascular: Negative for chest pain, palpitations and leg swelling.   Gastrointestinal: Positive for constipation. Negative for diarrhea, nausea and vomiting.   Musculoskeletal: Positive for arthralgias (R shoulder). Negative for myalgias.   Skin: Negative for rash and wound.   Neurological: Positive for weakness (R hand) and numbness. Negative for speech difficulty and headaches.   Hematological: Negative for adenopathy. Does not bruise/bleed easily.   Psychiatric/Behavioral: Negative for agitation and confusion.     Objective:     Vital Signs (Most Recent):  Temp: 97.7 °F (36.5 °C) (09/01/17 1112)  Pulse: 86 (09/01/17 1215)  Resp: (!) 22 (09/01/17 1215)  BP: 123/73 (09/01/17 1215)  SpO2: 97 % (09/01/17 1215)    Vital Signs Range (Last 24H):  Temp:  [97.7 °F (36.5 °C)]   Pulse:  []   Resp:  [12-24]   BP: (120-132)/(58-73)   SpO2:  [97 %-99 %]     Physical Exam  General:  Well-developed, well-nourished, nad  HEENT:  NCAT, PERRLA, EOMI.  Oropharyngeal membranes non-erythematous/without exudate  Neck:  Supple, no palpable lad, normal ROM without nuchal rigidity.  Resp:  Symmetric expansion, no increased wob, CTAB  CVS:  RRR, no m/r/g.  No LE edema.  Peripheral pulses 2+ (radial, dorsalis pedis)  Abd:  Abd soft, non-distended, non-tender to palpation, +BS  Extremities:  No rashes, wounds.  No bleeding or edema around recent R femoral entry site (for IVC placement)    Neurological Exam:   Mental Status:  Awake, alert, oriented x 3.  Able to spell 'world' forward and backward.  Recall 3/3.  Cranial Nerves:  VFs intact to movement bilaterally in all quadrants.  PERRLA, EOMI.  Smile slightly asymmetric with droop on L side, otherwise facial movements and sensation intact and symmetric.  Palate raises symmetrically, tongue protrudes midline. SCM/trapezius strength 5/5.  Motor:  Normal bulk and tone.   Strength 5/5 shoulder abduction, biceps/triceps,  strength, hip flexion, knee flexion/extension, plantarflexion/dorsiflexion.  No pronator drift.  Sensory:  Intact to light touch at all extremities with no inattention/extinction.  Intact to temperature throughout, able to discern pinprick from dull sensation at all extremities.  Reflexes:  Biceps, brachioradialis, patellar 2+.  No clonus.  Downgoing toe bilaterally.  Coordination:  Slow on LUISA but coordinated.  FNF, HTS without ataxia or dysmetria.  Gait:  Normal stride without shuffling, normal arm swing.  No imbalance.      NIH Stroke Scale:  Interval: baseline (upon arrival/admit)  Level of Consciousness: 0 - alert  LOC Questions: 0 - answers both correctly  LOC Commands: 0 - performs both correctly  Best Gaze: 0 - normal  Visual: 0 - no visual loss  Facial Palsy: 1 - minor  Motor Left Arm: 0 - no drift  Motor Right Arm: 0 - no drift  Motor Left Le - no drift  Motor Right Le - no drift  Limb Ataxia: 0 - absent  Sensory: 0 - normal  Best Language: 0 - no aphasia  Dysarthria: 0 - normal articulation  Extinction and Inattention: 0 - no neglect  NIH Stroke Scale Total: 1    Laboratory:    Recent Labs  Lab 17  1150   WBC 23.10*   RBC 3.38*   HGB 9.8*   HCT 29.8*   *   MCV 88   MCH 29.0   MCHC 32.9       Recent Labs  Lab 17  1150   CALCIUM 8.6*   PROT 6.6      K 4.0   CO2 24      BUN 18   CREATININE 0.6   ALKPHOS 1,311*   ALT 67*   AST 79*   BILITOT 0.5       Recent Labs  Lab 17  1150   INR 1.0     Diagnostic Results:  Brain Imaging: CT Head. Date: 17  Acute Pathology: None  Location: N/a  Old Vascular Pathology: Hypoattenuation in supratentorial white matter c/w chronic microvascular ischemic change  Location: Diffuse supratentorial    Cerebrovascular Imaging: CTA head and neck.  Date:  17  Intracranial Pathology: No acute intracranial abnormality  Location: N/a    Cervical Vascular Imaging: CTA head and  neck.  Date:  09/01/17  Cervical Vascular Pathology:  No acute intracranial abnormality  Location: N/a    Cardiac Evaluation:   Key Findings: N/a.  2D Echo pending.  EKG/Telemetry:  86 bpm, NSR.  Normal EKG.    Kelsey Sotelo MD  [facility]  Department of Vascular Neurology   [facility]

## 2017-09-01 NOTE — SUBJECTIVE & OBJECTIVE
Past Medical History:   Diagnosis Date    Blood clot in vein     right lower extremity    Cancer     unknown source     Past Surgical History:   Procedure Laterality Date     SECTION      CHOLECYSTECTOMY      COLONOSCOPY Left 2017    Procedure: COLONOSCOPY;  Surgeon: David Padilla MD;  Location: [facility] ([address] [place] [address]);  Service: Endoscopy;  Laterality: Left;  Lovenox/OK per Dr Hurt/Dimple/Vascular Surgery for pt to take 1/2 dose Lovenox on 17 in am/she takes 100 mg once daily and will only take 50 mg 17 in AM.    TONSILLECTOMY       Family History   Problem Relation Age of Onset    Breast cancer Sister 60    Cancer Father     Vasculitis Mother     Paget's disease of bone Mother     No Known Problems Brother     No Known Problems Daughter     No Known Problems Son     Colon cancer Neg Hx     Ovarian cancer Neg Hx      Social History   Substance Use Topics    Smoking status: Never Smoker    Smokeless tobacco: Never Used    Alcohol use Yes      Comment: [place] periodically     Review of patient's allergies indicates:  No Known Allergies  Medications: I have reviewed the current medication administration record.    Review of Systems   Constitutional: Negative for chills and fever.   Respiratory: Negative for cough and shortness of breath.    Cardiovascular: Negative for chest pain, palpitations and leg swelling.   Gastrointestinal: Positive for constipation. Negative for diarrhea, nausea and vomiting.   Musculoskeletal: Positive for arthralgias (R shoulder). Negative for myalgias.   Skin: Negative for rash and wound.   Neurological: Positive for weakness (R hand) and numbness. Negative for speech difficulty and headaches.   Hematological: Negative for adenopathy. Does not bruise/bleed easily.   Psychiatric/Behavioral: Negative for agitation and confusion.     Objective:     Vital Signs (Most Recent):  Temp: 97.7 °F (36.5 °C) (17 1112)  Pulse: 86 (17  1215)  Resp: (!) 22 (09/01/17 1215)  BP: 123/73 (09/01/17 1215)  SpO2: 97 % (09/01/17 1215)    Vital Signs Range (Last 24H):  Temp:  [97.7 °F (36.5 °C)]   Pulse:  []   Resp:  [12-24]   BP: (120-132)/(58-73)   SpO2:  [97 %-99 %]     Physical Exam  General:  Well-developed, well-nourished, nad  HEENT:  NCAT, PERRLA, EOMI.  Oropharyngeal membranes non-erythematous/without exudate  Neck:  Supple, no palpable lad, normal ROM without nuchal rigidity.  Resp:  Symmetric expansion, no increased wob, CTAB  CVS:  RRR, no m/r/g.  No LE edema.  Peripheral pulses 2+ (radial, dorsalis pedis)  Abd:  Abd soft, non-distended, non-tender to palpation, +BS  Extremities:  No rashes, wounds.  No bleeding or edema around recent R femoral entry site (for IVC placement)    Neurological Exam:   Mental Status:  Awake, alert, oriented x 3.  Able to spell 'world' forward and backward.  Recall 3/3.  Cranial Nerves:  VFs intact to movement bilaterally in all quadrants.  PERRLA, EOMI.  Smile slightly asymmetric with droop on L side, otherwise facial movements and sensation intact and symmetric.  Palate raises symmetrically, tongue protrudes midline. SCM/trapezius strength 5/5.  Motor:  Normal bulk and tone.  Strength 5/5 shoulder abduction, biceps/triceps,  strength, hip flexion, knee flexion/extension, plantarflexion/dorsiflexion.  No pronator drift.  Sensory:  Intact to light touch at all extremities with no inattention/extinction.  Intact to temperature throughout, able to discern pinprick from dull sensation at all extremities.  Reflexes:  Biceps, brachioradialis, patellar 2+.  No clonus.  Downgoing toe bilaterally.  Coordination:  Slow on LUISA but coordinated.  FNF, HTS without ataxia or dysmetria.  Gait:  Normal stride without shuffling, normal arm swing.  No imbalance.      NIH Stroke Scale:  Interval: baseline (upon arrival/admit)  Level of Consciousness: 0 - alert  LOC Questions: 0 - answers both correctly  LOC Commands: 0 -  performs both correctly  Best Gaze: 0 - normal  Visual: 0 - no visual loss  Facial Palsy: 1 - minor  Motor Left Arm: 0 - no drift  Motor Right Arm: 0 - no drift  Motor Left Le - no drift  Motor Right Le - no drift  Limb Ataxia: 0 - absent  Sensory: 0 - normal  Best Language: 0 - no aphasia  Dysarthria: 0 - normal articulation  Extinction and Inattention: 0 - no neglect  NIH Stroke Scale Total: 1    Laboratory:  CMP: No results for input(s): GLUCOSE, CALCIUM, ALBUMIN, PROT, NA, K, CO2, CL, BUN, CREATININE, ALKPHOS, ALT, AST, BILITOT in the last 24 hours.  CBC: No results for input(s): WBC, RBC, HGB, HCT, PLT, MCV, MCH, MCHC in the last 168 hours.  Lipid Panel: No results for input(s): CHOL, LDLCALC, HDL, TRIG in the last 168 hours.  Coagulation: No results for input(s): INR, APTT in the last 168 hours.    Invalid input(s): PT  Hgb A1C: No results for input(s): HGBA1C in the last 168 hours.  TSH: No results for input(s): TSH in the last 168 hours.    Diagnostic Results:  Brain Imaging: CT Head. Date: 17  Acute Pathology:   Location:   Old Vascular Pathology:   Location:     Cerebrovascular Imaging: CTA head and neck.  Date:  17  Intracranial Pathology:   Location:     Cervical Vascular Imaging: CTA head and neck.  Date:  17  Cervical Vascular Pathology:  Location:     Cardiac Evaluation:   Key Findings:   EKG/Telemetry:

## 2017-09-01 NOTE — ASSESSMENT & PLAN NOTE
-Onset approx 09/10/17 09:00, followed by R hand clumsiness.  Pt had taken lovenox 09/01/17.  -NIHSS 1.  CT head with no signs of acute infarct or hemorrhage.  -RFs:  Hypercoagulable state 2/2 malignancy and chemotherapy  -Lipid profile, TSH, CMP, Hgb A1c pending.    -On telemetry with no A fib noted.  Recommend 2D Echo.  -CTA head and neck pending.  Recommend MRI Brain w/ w/o contrast.

## 2017-09-01 NOTE — PT/OT/SLP EVAL
Speech Language Pathology Evaluation  Bedside Swallow Study  Discharge    Andra Matamoros   MRN: [id number]   ED 10/10    Admitting Diagnosis: <principal problem not specified>    Diet recommendations: Solid Diet Level: Regular  Liquid Diet Level: Thin Universal aspiration precautions    SLP Treatment Date: 17  Speech Start Time: 1530     Speech Stop Time: 1555     Speech Total (min): 25 min       TREATMENT BILLABLE MINUTES:  Eval 15  and Eval Swallow and Oral Function 10    Diagnosis: <principal problem not specified>      Past Medical History:   Diagnosis Date    Blood clot in vein     right lower extremity    Cancer     unknown source    Cerebrovascular accident (CVA) 2017     Past Surgical History:   Procedure Laterality Date     SECTION      CHOLECYSTECTOMY      COLONOSCOPY Left 2017    Procedure: COLONOSCOPY;  Surgeon: David Padilla MD;  Location: [facility] ([address] [place] [address]);  Service: Endoscopy;  Laterality: Left;  Lovenox/OK per Dr Hurt/Dimple/Vascular Surgery for pt to take 1/2 dose Lovenox on 17 in am/she takes 100 mg once daily and will only take 50 mg 17 in AM.    TONSILLECTOMY         Has the patient been evaluated by SLP for swallowing? : Yes  Keep patient NPO?: No   General Precautions: Standard,      Current Respiratory Status:  (room air)     Social Hx: Patient lives with     Prior diet: regular diet, thin liquids    Occupational/hobbies/homemaking: previously worked as a [employer] .    Subjective:  Pt awake and cooperative with  present in room. Communicated with RN prior and s/p evaluation. Rn reported trasnport is on their way to bring pt to the floor.   Patient goals: none stated.     Pain/Comfort  Pain Rating 1: 0/10    Objective:      Oral Musculature Evaluation  Oral Musculature: WFL; noted mild right side labial asymmetry at rest only  Dentition: present and adequate  Mucosal Quality: good  Mandibular Strength and Mobility: WFL  Oral  "Labial Strength and Mobility: WFL  Lingual Strength and Mobility: WFL  Velar Elevation: WFL  Buccal Strength and Mobility: WFL  Volitional Cough: elicited  Volitional Swallow: timely  Voice Prior to PO Intake: clear     COGNITIVE STATUS:  Behavioral Observations: alert and cooperative  Memory:  · Immediate: % accuracy with all number and word lists  · Short Term: WFL recalled 2/3 words s/p a 5 minute delay with min cues, however, pt recalled fucntional information after a delay. Pt and  report this is pt's baseline  · Long Term: % accuracy  Orientation: Oriented x4   Attention: WFL  Problem Solving: % accuracy  Insight Awareness: pt with good insight   Sequencing: % accuracy with 4 word mental manipulation task; pt with accurate sequencing of functional evelts  Pragmatics: WNL  Money/time management: % acc    LANGUAGE:    Receptive Language:   Complex y/n Questions: % accuracy  Complex Directions: % accuracy    Expressive Language:  Naming: named 15 animals and 15 states given 1 minute each (norm 15-20)  Sentence Completion: % accuracy   Responsive Speech: % accuracy  Repetition: % accuracy      Motor Speech: No noted Dysarthria, Apraxia of Speech, Ataxia    Voice: adequate volumate, rate, prosody, breath support    Augmentative Alternative Communication: no     Writing: Impaired; pt reported impaired ability to write with right hand 2/2 "feeling clumsy with it"    Visual-Spatial: WFL; no noted neglect      Bedside Swallow Eval:  Consistencies Assessed:  THIN:-ice chip x1, self regulated cup sip of water x2, self regulated straw sip of water x2  PUREE:- tsp bites of pudding x2  SOLID: -bite of lorna cracker x5  Oral Phase: WFL  Pharyngeal Phase: no overt clinical  signs/symptoms of aspiration and no overt clinical signs/symptoms of pharyngeal dysphagia    Additional Treatment:  SLP provided pt and family () education on the " following topics: SLP role, POC, safe swallowing precautions, and s/s of aspiration. Pt and  verbalized understanding of all discussed and all questions/concerns addressed.   Communicated recommendations with RN. Dorota DISLA. Awaiting call back.     Assessment:  Andra Matamoros is a 66 y.o. female with no noted speech, language, cogntiive, or swallowing deficits. No further skilled ST services warranted at this time. Please re-consult as needed.        Discharge recommendations: Discharge Facility/Level Of Care Needs:  (no ST needs)     Goals:    SLP Goals     Not on file          Multidisciplinary Problems (Resolved)        Problem: SLP Goal    Goal Priority Disciplines Outcome   SLP Goal   (Resolved)     SLP Outcome(s) achieved   Description:  Short Term Goals:   1. Pt will participate in a bedside swallow study to determine the safest and least restrictive possible po diet with possible updated goals to follow pending results. -MET  2. Pt will participate in a speech, language, and cognitive evaluation with possible updated goals to follow pending results. -MET                       Plan:   Patient to be seen     Plan of Care expires:    Plan of Care reviewed with: patient, spouse  SLP Follow-up?: No         SLP G-Codes  Functional Assessment Tool Used: noms  Score: 7  Functional Limitations: Swallowing  Swallow Current Status (): MABEL  Swallow Goal Status (): JUAN Juarez, CCC-SLP   Pager: [phone]  09/01/2017

## 2017-09-02 VITALS
HEART RATE: 79 BPM | TEMPERATURE: 98 F | SYSTOLIC BLOOD PRESSURE: 132 MMHG | HEIGHT: 63 IN | BODY MASS INDEX: 23.25 KG/M2 | RESPIRATION RATE: 17 BRPM | WEIGHT: 131.19 LBS | DIASTOLIC BLOOD PRESSURE: 72 MMHG | OXYGEN SATURATION: 98 %

## 2017-09-02 PROBLEM — I63.89 CEREBRAL INFARCTION, WATERSHED DISTRIBUTION, BILATERAL, ACUTE: Status: ACTIVE | Noted: 2017-09-01

## 2017-09-02 PROBLEM — G93.6 CYTOTOXIC CEREBRAL EDEMA: Status: ACTIVE | Noted: 2017-09-02

## 2017-09-02 LAB
ALBUMIN SERPL BCP-MCNC: 2.5 G/DL
ALP SERPL-CCNC: 1173 U/L
ALT SERPL W/O P-5'-P-CCNC: 57 U/L
ANION GAP SERPL CALC-SCNC: 7 MMOL/L
ANISOCYTOSIS BLD QL SMEAR: SLIGHT
APTT BLDCRRT: 22.5 SEC
AST SERPL-CCNC: 59 U/L
BASOPHILS NFR BLD: 0 %
BILIRUB SERPL-MCNC: 0.4 MG/DL
BUN SERPL-MCNC: 13 MG/DL
CALCIUM SERPL-MCNC: 8.8 MG/DL
CHLORIDE SERPL-SCNC: 104 MMOL/L
CK MB SERPL-MCNC: 0.9 NG/ML
CK MB SERPL-RTO: 4.5 %
CK SERPL-CCNC: 20 U/L
CO2 SERPL-SCNC: 24 MMOL/L
CREAT SERPL-MCNC: 0.6 MG/DL
DIFFERENTIAL METHOD: ABNORMAL
EOSINOPHIL NFR BLD: 1 %
ERYTHROCYTE [DISTWIDTH] IN BLOOD BY AUTOMATED COUNT: 17.4 %
EST. GFR  (AFRICAN AMERICAN): >60 ML/MIN/1.73 M^2
EST. GFR  (NON AFRICAN AMERICAN): >60 ML/MIN/1.73 M^2
GLUCOSE SERPL-MCNC: 86 MG/DL
HCT VFR BLD AUTO: 30 %
HGB BLD-MCNC: 9.6 G/DL
INR PPP: 0.9
LYMPHOCYTES NFR BLD: 6 %
MAGNESIUM SERPL-MCNC: 2.1 MG/DL
MCH RBC QN AUTO: 29.1 PG
MCHC RBC AUTO-ENTMCNC: 32 G/DL
MCV RBC AUTO: 91 FL
METAMYELOCYTES NFR BLD MANUAL: 1 %
MONOCYTES NFR BLD: 9 %
MYELOCYTES NFR BLD MANUAL: 1 %
NEUTROPHILS NFR BLD: 78 %
NEUTS BAND NFR BLD MANUAL: 4 %
PHOSPHATE SERPL-MCNC: 3.3 MG/DL
PLATELET # BLD AUTO: 352 K/UL
PLATELET BLD QL SMEAR: ABNORMAL
PMV BLD AUTO: 9.7 FL
POIKILOCYTOSIS BLD QL SMEAR: SLIGHT
POLYCHROMASIA BLD QL SMEAR: ABNORMAL
POTASSIUM SERPL-SCNC: 4.3 MMOL/L
PROT SERPL-MCNC: 6.1 G/DL
PROTHROMBIN TIME: 10.1 SEC
RBC # BLD AUTO: 3.3 M/UL
SODIUM SERPL-SCNC: 135 MMOL/L
TROPONIN I SERPL DL<=0.01 NG/ML-MCNC: 0.23 NG/ML
WBC # BLD AUTO: 19.8 K/UL

## 2017-09-02 PROCEDURE — 85610 PROTHROMBIN TIME: CPT

## 2017-09-02 PROCEDURE — 97530 THERAPEUTIC ACTIVITIES: CPT

## 2017-09-02 PROCEDURE — 84100 ASSAY OF PHOSPHORUS: CPT

## 2017-09-02 PROCEDURE — 99233 SBSQ HOSP IP/OBS HIGH 50: CPT | Mod: GC,,, | Performed by: PSYCHIATRY & NEUROLOGY

## 2017-09-02 PROCEDURE — 85007 BL SMEAR W/DIFF WBC COUNT: CPT

## 2017-09-02 PROCEDURE — 85027 COMPLETE CBC AUTOMATED: CPT

## 2017-09-02 PROCEDURE — A4216 STERILE WATER/SALINE, 10 ML: HCPCS | Performed by: STUDENT IN AN ORGANIZED HEALTH CARE EDUCATION/TRAINING PROGRAM

## 2017-09-02 PROCEDURE — 80053 COMPREHEN METABOLIC PANEL: CPT

## 2017-09-02 PROCEDURE — 82553 CREATINE MB FRACTION: CPT

## 2017-09-02 PROCEDURE — 36415 COLL VENOUS BLD VENIPUNCTURE: CPT

## 2017-09-02 PROCEDURE — 63600175 PHARM REV CODE 636 W HCPCS: Performed by: STUDENT IN AN ORGANIZED HEALTH CARE EDUCATION/TRAINING PROGRAM

## 2017-09-02 PROCEDURE — 97165 OT EVAL LOW COMPLEX 30 MIN: CPT

## 2017-09-02 PROCEDURE — 25000003 PHARM REV CODE 250: Performed by: STUDENT IN AN ORGANIZED HEALTH CARE EDUCATION/TRAINING PROGRAM

## 2017-09-02 PROCEDURE — 83735 ASSAY OF MAGNESIUM: CPT

## 2017-09-02 PROCEDURE — 85730 THROMBOPLASTIN TIME PARTIAL: CPT

## 2017-09-02 PROCEDURE — 84484 ASSAY OF TROPONIN QUANT: CPT

## 2017-09-02 PROCEDURE — 99221 1ST HOSP IP/OBS SF/LOW 40: CPT | Mod: GC,,, | Performed by: INTERNAL MEDICINE

## 2017-09-02 RX ORDER — PROMETHAZINE HYDROCHLORIDE 25 MG/1
25 TABLET ORAL EVERY 6 HOURS PRN
Qty: 42 TABLET | Refills: 3 | Status: SHIPPED | OUTPATIENT
Start: 2017-09-02

## 2017-09-02 RX ORDER — ENOXAPARIN SODIUM 100 MG/ML
1.5 INJECTION SUBCUTANEOUS DAILY
Qty: 27 ML | Refills: 0 | Status: SHIPPED | OUTPATIENT
Start: 2017-09-02 | End: 2017-10-02

## 2017-09-02 RX ORDER — ASPIRIN 325 MG
325 TABLET, DELAYED RELEASE (ENTERIC COATED) ORAL DAILY
Refills: 0 | COMMUNITY
Start: 2017-09-03 | End: 2018-09-03

## 2017-09-02 RX ORDER — ENOXAPARIN SODIUM 100 MG/ML
1.5 INJECTION SUBCUTANEOUS EVERY 24 HOURS
Status: DISCONTINUED | OUTPATIENT
Start: 2017-09-02 | End: 2017-09-02 | Stop reason: HOSPADM

## 2017-09-02 RX ORDER — ONDANSETRON 8 MG/1
8 TABLET, ORALLY DISINTEGRATING ORAL EVERY 12 HOURS PRN
Qty: 60 TABLET | Refills: 3 | Status: SHIPPED | OUTPATIENT
Start: 2017-09-02

## 2017-09-02 RX ORDER — DRONABINOL 2.5 MG/1
2.5 CAPSULE ORAL
Qty: 60 CAPSULE | Refills: 3 | Status: SHIPPED | OUTPATIENT
Start: 2017-09-02

## 2017-09-02 RX ORDER — ATORVASTATIN CALCIUM 40 MG/1
40 TABLET, FILM COATED ORAL DAILY
Qty: 90 TABLET | Refills: 3 | Status: SHIPPED | OUTPATIENT
Start: 2017-09-03 | End: 2018-09-03

## 2017-09-02 RX ORDER — MORPHINE SULFATE 15 MG/1
15 TABLET, FILM COATED, EXTENDED RELEASE ORAL EVERY 12 HOURS
Qty: 30 TABLET | Refills: 0 | Status: SHIPPED | OUTPATIENT
Start: 2017-09-02

## 2017-09-02 RX ADMIN — ENOXAPARIN SODIUM 90 MG: 100 INJECTION SUBCUTANEOUS at 02:09

## 2017-09-02 RX ADMIN — POLYETHYLENE GLYCOL 3350 17 G: 17 POWDER, FOR SOLUTION ORAL at 08:09

## 2017-09-02 RX ADMIN — ASPIRIN 325 MG: 325 TABLET, DELAYED RELEASE ORAL at 08:09

## 2017-09-02 RX ADMIN — ATORVASTATIN CALCIUM 40 MG: 20 TABLET, FILM COATED ORAL at 08:09

## 2017-09-02 RX ADMIN — SODIUM CHLORIDE, PRESERVATIVE FREE 3 ML: 5 INJECTION INTRAVENOUS at 05:09

## 2017-09-02 RX ADMIN — PANCRELIPASE 1 CAPSULE: 60000; 12000; 38000 CAPSULE, DELAYED RELEASE PELLETS ORAL at 08:09

## 2017-09-02 RX ADMIN — MORPHINE SULFATE 15 MG: 15 TABLET, EXTENDED RELEASE ORAL at 08:09

## 2017-09-02 RX ADMIN — ONDANSETRON 8 MG: 8 TABLET, ORALLY DISINTEGRATING ORAL at 07:09

## 2017-09-02 RX ADMIN — ENOXAPARIN SODIUM 90 MG: 100 INJECTION SUBCUTANEOUS at 07:09

## 2017-09-02 NOTE — PT/OT/SLP EVAL
Occupational Therapy  Evaluation    Andra Matamoros   MRN: [id number]   Admitting Diagnosis: Cerebral infarction, watershed distribution, bilateral, acute    OT Date of Treatment: 17   OT Start Time: 1053  OT Stop Time: 1113  OT Total Time (min): 20 min    Billable Minutes:  Evaluation 20 minutes    Diagnosis: Cerebral infarction, watershed distribution, bilateral, acute       Past Medical History:   Diagnosis Date    Blood clot in vein     right lower extremity    Cancer     unknown source    Cerebral infarction, watershed distribution, bilateral, acute 2017    Cerebrovascular accident (CVA) 2017      Past Surgical History:   Procedure Laterality Date     SECTION      CHOLECYSTECTOMY      COLONOSCOPY Left 2017    Procedure: COLONOSCOPY;  Surgeon: David Padilla MD;  Location: [facility] ([address] [place] [address]);  Service: Endoscopy;  Laterality: Left;  Lovenox/OK per Dr Hurt/Dimple/Vascular Surgery for pt to take 1/2 dose Lovenox on 17 in am/she takes 100 mg once daily and will only take 50 mg 17 in AM.    TONSILLECTOMY         General Precautions: Standard, aspiration, fall  Orthopedic Precautions: N/A  Braces: N/A      Patient History:  Living Environment  Lives With: spouse  Living Arrangements: house  Transportation Available: family or friend will provide  Living Environment Comment: Pt lives in a 2sh w/ spouse and 1 step to enter and 2 steps within the home. PLOF indep. Spouse can assist upon D/C  Equipment Currently Used at Home: none    Prior level of function:   Bed Mobility/Transfers: independent  Grooming: independent  Bathing: independent  Upper Body Dressing: independent  Lower Body Dressing: independent  Toileting: independent  Home Management Skills: independent        Dominant hand: right    Subjective:  Communicated with nurse prior to session.  Pt agreeable to skilled OT services.  Chief Complaint: no complaints  Patient/Family stated goals: return  home.    Pain/Comfort  Pain Rating 1: 0/10  Pain Rating Post-Intervention 1: 0/10    Objective:  Patient found with: telemetry, pulse ox (continuous)  Pt received in supine.    Cognitive Exam:  Oriented to: Person, Place, Time and Situation  Follows Commands/attention: Follows multistep  commands  Communication: clear/fluent  Memory:  No Deficits noted  Safety awareness/insight to disability: intact  Coping skills/emotional control: Appropriate to situation    Visual/perceptual:  Impaired  RUE dysmetria    Physical Exam:  Postural examination/scapula alignment: Rounded shoulder  Skin integrity: Visible skin intact  Edema: None noted    Sensation:   Intact    Upper Extremity Range of Motion:  Right Upper Extremity: WFL  Left Upper Extremity: WFL    Upper Extremity Strength:  Right Upper Extremity: WFL  Left Upper Extremity: WFL   Strength: R: 3+/5    L: 4/5    Fine motor coordination:   Impaired  Right hand, manipulation of objects toothbrush    Gross motor coordination: WFL    Functional Mobility:  Bed Mobility:  Rolling/Turning Right: Stand by assistance  Scooting/Bridging: Stand by Assistance  Supine to Sit: Stand by Assistance  Sit to Supine: Stand by Assistance    Transfers:  Sit <> Stand Assistance: Stand By Assistance  Sit <> Stand Assistive Device: No Assistive Device    Functional Ambulation: Pt ambulated 20 ft at supv w/o AD.    Activities of Daily Living:  LE Dressing Level of Assistance: Independent (donned/doffed socks)  Grooming Level of Assistance: Supervision (oral hygiene; decreased R FM skills when using toothbrush )      Balance:   Static Sit: GOOD: Takes MODERATE challenges from all directions  Dynamic Sit: GOOD: Maintains balance through MODERATE excursions of active trunk movement  Static Stand: GOOD: Takes MODERATE challenges from all directions  Dynamic stand: GOOD: Needs SUPERVISION only during gait and able to self right with moderate     Therapeutic Activities and Exercises:  Pt  "educated on POC.    AM-PAC 6 CLICK ADL  How much help from another person does this patient currently need?  1 = Unable, Total/Dependent Assistance  2 = A lot, Maximum/Moderate Assistance  3 = A little, Minimum/Contact Guard/Supervision  4 = None, Modified [place]/Independent    Putting on and taking off regular lower body clothing? : 4  Bathing (including washing, rinsing, drying)?: 3  Toileting, which includes using toilet, bedpan, or urinal? : 3  Putting on and taking off regular upper body clothing?: 4  Taking care of personal grooming such as brushing teeth?: 4  Eating meals?: 4  Total Score: 22    AM-PAC Raw Score CMS "G-Code Modifier Level of Impairment Assistance   6 % Total / Unable   7 - 9 CM 80 - 100% Maximal Assist   10-14 CL 60 - 80% Moderate Assist   15 - 19 CK 40 - 60% Moderate Assist   20 - 22 CJ 20 - 40% Minimal Assist   23 CI 1-20% SBA / CGA   24 CH 0% Independent/ Mod I       Patient left HOB elevated with call button in reach and spouse present    Assessment:  Andra Matamoros is a 66 y.o. female with a medical diagnosis of Cerebral infarction, watershed distribution, bilateral, acute and presents with impairments listed below. Pt displayed  strength and FM skills in R hand which hinders her ADL's and B/L hand tasks. Pt would benefit from skilled OT services to improve independence and overall occupational functioning.    Rehab identified problem list/impairments: Rehab identified problem list/impairments: weakness, impaired functional mobilty, impaired self care skills, impaired endurance    Rehab potential is good.    Activity tolerance: Good    Discharge recommendations: Discharge Facility/Level Of Care Needs: outpatient OT     Barriers to discharge: Barriers to Discharge: None    Equipment recommendations: none     GOALS:    Occupational Therapy Goals        Problem: Occupational Therapy Goal    Goal Priority Disciplines Outcome Interventions   Occupational Therapy Goal     " OT, PT/OT     Description:  Goals to be met by: 9/2/2017     Patient will increase functional independence with ADLs by performing:    UE Dressing with [place].  LE Dressing with [place].  Grooming while standing with [place].  Toileting from toilet with [place] for hygiene and clothing management.   Toilet transfer to toilet with [place].                      PLAN:  Patient to be seen 3 x/week to address the above listed problems via self-care/home management, therapeutic activities, therapeutic exercises  Plan of Care expires: 10/02/17  Plan of Care reviewed with: patient, spouse    Pérez PEDRO LUIS Mathew, OT  09/02/2017

## 2017-09-02 NOTE — DISCHARGE SUMMARY
"[facility]  Vascular Neurology  Comprehensive Stroke Center  Discharge Summary     Summary:     Admit Date: 9/1/2017 11:18 AM    Discharge Date and Time:  09/02/2017 3:14 PM    Attending Physician: Richy Wilhelm MD     Discharge Provider: Kelsey Sotelo MD    History of Present Illness: 67 yo R-handed F with PMHx of liver mass with 07/19/17 pathology concerning for adenocarcinoma (on 3 chemotherapy agents at [facility]), type II NSTEMI 07/12/17, PE with IVC filter placed 08/25/17 and on lovenox (taken this am) presents to [facility] ED 09/01/17 with complaint of R facial numbness and R hand clumsiness starting at approximately 9 am.  Notes that she has had approximately 2-3 weeks of blurred vision in the R eye only as well.  This am, blurred vision was present in the R eye followed by R facial numbness then she noticed the R hand clumsiness.  Describes R facial numbness as numbness between upper lip and nose as well as mid-cheek.  She noticed this then a few minutes later went to write and could not hold the pen between her index finger and thumb, describes being able to write with the pen but writing being scribbled.  Otherwise denies numbness, weakness, speech difficulty, or visual field deficits.  No change in medications this am, no recent fever/chills, n/v/c/d, CP/palpitations, SOB/cough.      Hospital Course (synopsis of major diagnoses, care, treatment, and services provided during the course of the hospital stay): CT head done in ED 09/01/17 showing no acute intracranial pathology.  CTA head and neck 09/01/17 also showed no vascular pathology.  09/01/17 MRI brain w/ w/o contrast showed "punctate foci of recent infarction involving the bilateral frontoparietal regions, the distribution of which suggests sequela of recent hypotensive episode versus embolic phenomenon."  It had no intracranial hemorrhage and showed some chronic microvascular ischemic change.  Patient was also seen by PT and OT who " state she is ok for outpatient PT/OT.  Discussed possibility of a hypotensive episode as well as her chemotherapeutic agents predisposing her to vascular changes as being possible causes of her initial presentation.  Patient requested additional nausea medication and appetite stimulant to help with her chemotherapy so was discharged on phenergan and dronabinol in addition to her home meds.  Plan for 2D Echo and US BL carotid imaging as an outpatient.      NIH Stroke Scale:  Interval: 7 days or at discharge (whichever comes first)  Level of Consciousness: 0 - alert  LOC Questions: 0 - answers both correctly  LOC Commands: 0 - performs both correctly  Best Gaze: 0 - normal  Visual: 0 - no visual loss  Facial Palsy: 1 - minor  Motor Left Arm: 0 - no drift  Motor Right Arm: 0 - no drift  Motor Left Le - no drift  Motor Right Le - no drift  Limb Ataxia: 0 - absent  Sensory: 0 - normal  Best Language: 0 - no aphasia  Dysarthria: 0 - normal articulation  Extinction and Inattention: 0 - no neglect  NIH Stroke Scale Total: 1  David Coma Scale:  Best Eye Response: 4 - spontaneous  Best Motor Response: 6 - obeys commands  Best Verbal Response: 5 - oriented  Clint Coma Scale Total: 15  Modified Tyler Scale:   Timeline: At discharge  Modified Marlin Score: 1 - no significant disability    [id number] Scale:   Age: 1 - 65-74 years old  CHF History: 0 - no  HTN History: 0 - no  Stroke/TIA/Thromboembolism History: 0 - no  Vascular Disease History: 0 - no  Diabetes Mellitus in History: 0 - no  Female: 1 - yes  [id number] Scale Total: 2        Assessment/Plan:     Interventions: None    Complications: None    Research Candidate?:  No    Neurological deficit at discharge: Mild R facial numbness     Disposition: Home or Self Care    Final Active Diagnoses:    Diagnosis Date Noted POA    PRINCIPAL PROBLEM:  Cerebral infarction, watershed distribution, bilateral, acute [I63.8] 2017 Yes    Cytotoxic cerebral edema  [G93.6] 09/02/2017 Yes    Right facial numbness [R20.0] 09/01/2017 Yes      Problems Resolved During this Admission:    Diagnosis Date Noted Date Resolved POA     Cytotoxic cerebral edema    Visualized in both hemispheres in the MRI.  Patient with history of low blood pressure which likely contributed to this pattern.        Right facial numbness    Main presentation. Improved some today.        * Cerebral infarction, watershed distribution, bilateral, acute    -Onset approx 09/10/17 09:00, followed by R hand clumsiness.  Pt had taken lovenox 09/01/17.  -NIHSS 1 on admission.  CT head with no signs of acute infarct or hemorrhage.  -RFs:  Hypercoagulable state 2/2 malignancy and chemotherapy  -Lipid profile, TSH, CMP wnl.  HgbA1c pending, BG has been wnl on this admission.  -On telemetry with no A fib noted.  Recommend 2D Echo--to be done at bedside today.  -CTA head and neck with no abnormalities.  -MRI brain notable for punctate foci of recent infarction consistent with hypotensive episodes in a watershed distribution.   -PRES also possible given recent chemotherapy, but felt less likely.  -PT ok with patient going home with outpatient PT.  OT will see patient.  -Likely discharge home today with outpatient US BL carotids and outpatient PT/OT.          Recommendations:     Post-discharge complication risks: None    Stroke Education given to: patient and caregiver    Follow-up in Stroke Clinic in 30 days    Discharge Plan:  Follow up studies:  2D Echo w/ CFD and bubble study.  US Carotid Bilateral.  Therapy:  Outpatient PT, OT.  Referrals placed.  Follow up appts:  Vascular Neurology in 4 wks, Hem/Onc clinic to establish care, and PCP normal follow up    Follow Up:  Follow-up Information     Wesly Parr MD. Schedule an appointment as soon as possible for a visit in 1 month.    Specialty:  Internal Medicine  Why:  As needed for normal follow up  Contact information:  [address] [place]  [address]  [phone]             [facility].    Specialty:  Hematology and Oncology  Why:  Establish care for cholangiocarcinoma (on gemcitabine, cisplatin, paclitaxel through MD Menjivar recently)  Contact information:  [address]  [facility] [address] [phone]  Additional information:  [facility] - 3rd Floor               Patient Instructions:     Radiology US Carotid Bilateral   Standing Status: Future  Standing Exp. Date: 09/02/18   Order Specific Question Answer Comments   Reason for Exam: Secondary stroke prevention      2D echo with color flow doppler and bubble contrast   Standing Status: Future  Standing Exp. Date: 09/02/18   Order Comments: Secondary stroke prevention     Medications:  Reconciled Home Medications:   Current Discharge Medication List      START taking these medications    Details   aspirin (ECOTRIN) 325 MG EC tablet Take 1 tablet (325 mg total) by mouth once daily.  Refills: 0      atorvastatin (LIPITOR) 40 MG tablet Take 1 tablet (40 mg total) by mouth once daily.  Qty: 90 tablet, Refills: 3      dronabinol (MARINOL) 2.5 MG capsule Take 1 capsule (2.5 mg total) by mouth 2 (two) times daily before meals.  Qty: 60 capsule, Refills: 3      ondansetron (ZOFRAN-ODT) 8 MG TbDL Take 1 tablet (8 mg total) by mouth every 12 (twelve) hours as needed.  Qty: 60 tablet, Refills: 3      promethazine (PHENERGAN) 25 MG tablet Take 1 tablet (25 mg total) by mouth every 6 (six) hours as needed for Nausea.  Qty: 42 tablet, Refills: 3         CONTINUE these medications which have NOT CHANGED    Details   !! enoxaparin (LOVENOX) 100 mg/mL Syrg Inject 0.9 mLs (90 mg total) into the skin once daily at 6am.  Qty: 30 Syringe, Refills: 2      lipase-protease-amylase 12,000-38,000-60,000 units (CREON) CpDR Take 1 capsule by mouth before meals and at bedtime as needed.  Qty: 150 capsule, Refills: 11    Associated Diagnoses: Pancreatic insufficiency      !! morphine  (MS CONTIN) 15 MG 12 hr tablet Take 1 tablet (15 mg total) by mouth 2 (two) times daily.  Qty: 60 tablet, Refills: 0    Associated Diagnoses: Neoplasm related pain      oxycodone (ROXICODONE) 5 MG immediate release tablet Take 1 tablet (5 mg total) by mouth every 6 (six) hours as needed for Pain.  Qty: 90 tablet, Refills: 0    Associated Diagnoses: Neoplasm related pain      polyethylene glycol (GLYCOLAX) 17 gram/dose powder       prochlorperazine (COMPAZINE) 10 MG tablet Take 10 mg by mouth every 6 (six) hours as needed.        Kelsey Sotelo MD  Comprehensive Stroke Center  Department of Vascular Neurology   [facility]

## 2017-09-02 NOTE — PLAN OF CARE
Problem: Occupational Therapy Goal  Goal: Occupational Therapy Goal  Goals to be met by: 9/2/2017     Patient will increase functional independence with ADLs by performing:    UE Dressing with [place].  LE Dressing with [place].  Grooming while standing with [place].  Toileting from toilet with [place] for hygiene and clothing management.   Toilet transfer to toilet with [place].    Initiate OT POC    Comments: Pérez Mathew OTR/L  9/2/2017

## 2017-09-02 NOTE — PROGRESS NOTES
Attempted to perform a bedside echo.  Nurse wants us to come back at a later time b/c the patient and was sick and vomiting.  We will try again later.

## 2017-09-02 NOTE — HOSPITAL COURSE
"CT head done in ED 09/01/17 showing no acute intracranial pathology.  CTA head and neck 09/01/17 also showed no vascular pathology.  09/01/17 MRI brain w/ w/o contrast showed "punctate foci of recent infarction involving the bilateral frontoparietal regions, the distribution of which suggests sequela of recent hypotensive episode versus embolic phenomenon."  It had no intracranial hemorrhage and showed some chronic microvascular ischemic change.  Patient was also seen by PT and OT who state she is ok for outpatient PT/OT.  Discussed possibility of a hypotensive episode as well as her chemotherapeutic agents predisposing her to vascular changes as being possible causes of her initial presentation.  Patient requested additional nausea medication and appetite stimulant to help with her chemotherapy so was discharged on phenergan and dronabinol in addition to her home meds.  Plan for 2D Echo and US BL carotid imaging as an outpatient.    "

## 2017-09-02 NOTE — PLAN OF CARE
Problem: Physical Therapy Goal  Goal: Physical Therapy Goal  Goals to be met by: 2017     Patient will increase functional independence with mobility by performin. Gait  x 200 feet with [place] and no LOB or path deviation with no assistive device.    MET 2017  2. Ascend/descend 1 flight of stairs with 1 hand rail and stand by assist to access home environment.     NOT MET  3. Patient will perform dynamic gait activities with no instability or LOB.     NOT MET        Goals remain appropriate.     Akilah Bartlett, LALA.  2017

## 2017-09-02 NOTE — PROGRESS NOTES
Pt arrived to MRI via wheelchair, alert on room air and on continuous telemetry. Changed to MRI compatible telemetry and informed centralized monitoring.

## 2017-09-02 NOTE — CONSULTS
[facility]  Hematology/Oncology  Consult Note    Patient Name: Andra Matamoros  MRN: [id number]  Admission Date: 9/1/2017  Hospital Length of Stay: 1 days  Code Status: Full Code   Attending Provider: Richy Wilhelm MD  Consulting Provider: Kee Zhou MD  Primary Care Physician: Wesly Parr MD  Principal Problem:Cerebral infarction, watershed distribution, bilateral, acute    Inpatient consult to Hematology/Oncology  Consult performed by: KEE ZHOU  Consult ordered by: ZORAN DIOP        Subjective:     HPI:    The patient is a 66 y.o. female with hx of: PE, cholangiocarcinoma/Upper GI origin ca, on chemo that presents to the ED with a complaint of numbness and blurred vision. Pt noted R blurred vision and numbness of R portion of her face in the region of her upper lip and nose. Pt is R hand dominant. At 8am, pt had difficulty signing her name as she was having difficulty handling the pen. No headache, N/V, pallor, other extremity weakness. MRI of brain shows punctate foci of recent infarction involving the bilateral frontoparietal regions. Patient is s/p IVC filter placement and has been on therapeutic lovenox dosed daily. Has not been on asa. Denies episodes of hypotension however, does not check BP at home.   Patient has been receiving treatment of her cancer at [facility]. Per notes, cis, gem and paclitaxel. She states she is on her off week and will go back for more treatment Wednesday. She has genetic studies pending to determine her tumor of origin more specifically as her histology was not precise although clinical suspicion for cholangiocarcinoma is high.      Oncology Treatment Plan:   OP FOLFOX 6 (LEUCO)    Medications:  Continuous Infusions:   sodium chloride 0.9%       Scheduled Meds:   aspirin  325 mg Oral Daily    atorvastatin  40 mg Oral Daily    enoxaparin  1.5 mg/kg Subcutaneous Daily    lipase-protease-amylase  12,000-38,000-60,000 units  1 capsule Oral TID WM    morphine  15 mg Oral Q12H    polyethylene glycol  17 g Oral Daily    sodium chloride 0.9%  3 mL Intravenous Q8H     PRN Meds:labetalol, ondansetron, oxycodone, prochlorperazine, sodium chloride 0.9%     Review of patient's allergies indicates:  No Known Allergies     Past Medical History:   Diagnosis Date    Blood clot in vein     right lower extremity    Cancer     unknown source    Cerebral infarction, watershed distribution, bilateral, acute 2017    Cerebrovascular accident (CVA) 2017     Past Surgical History:   Procedure Laterality Date     SECTION      CHOLECYSTECTOMY      COLONOSCOPY Left 2017    Procedure: COLONOSCOPY;  Surgeon: David Padilla MD;  Location: [facility] ([address] [place] [address]);  Service: Endoscopy;  Laterality: Left;  Lovenox/OK per Dr Hurt/Dimple/Vascular Surgery for pt to take 1/2 dose Lovenox on 17 in am/she takes 100 mg once daily and will only take 50 mg 17 in AM.    TONSILLECTOMY       Family History     Problem Relation (Age of Onset)    Breast cancer Sister (60)    Cancer Father    No Known Problems Brother, Daughter, Son    Paget's disease of bone Mother    Vasculitis Mother        Social History Main Topics    Smoking status: Never Smoker    Smokeless tobacco: Never Used    Alcohol use Yes      Comment: [place] periodically    Drug use: No    Sexual activity: Not Currently     Partners: Male     Birth control/ protection: Post-menopausal       Review of Systems   Constitutional: Negative for fatigue and fever.   HENT: Negative for congestion and trouble swallowing.    Eyes: Negative for photophobia and visual disturbance.   Respiratory: Negative for cough and shortness of breath.    Gastrointestinal: Negative for abdominal distention and abdominal pain.   Genitourinary: Negative for dysuria and hematuria.   Musculoskeletal: Negative for arthralgias and back pain.   Skin: Negative.   Negative for color change and pallor.   Neurological: Positive for weakness. Negative for seizures and light-headedness.   Hematological: Negative for adenopathy.   Psychiatric/Behavioral: Negative for agitation and behavioral problems.     Objective:     Vital Signs (Most Recent):  Temp: 98.1 °F (36.7 °C) (09/02/17 0755)  Pulse: 88 (09/02/17 1100)  Resp: 17 (09/02/17 0755)  BP: 122/68 (09/02/17 0755)  SpO2: 98 % (09/02/17 0755) Vital Signs (24h Range):  Temp:  [97.9 °F (36.6 °C)-98.6 °F (37 °C)] 98.1 °F (36.7 °C)  Pulse:  [79-92] 88  Resp:  [16-18] 17  SpO2:  [95 %-98 %] 98 %  BP: (112-133)/(60-70) 122/68     Weight: 59.5 kg (131 lb 3 oz)  Body mass index is 23.24 kg/m².  Body surface area is 1.63 meters squared.    No intake or output data in the 24 hours ending 09/02/17 1405    Physical Exam   Constitutional: She is oriented to person, place, and time. She appears well-developed and well-nourished.   HENT:   Mouth/Throat: Oropharynx is clear and moist.   Eyes: Conjunctivae are normal. Pupils are equal, round, and reactive to light.   Cardiovascular: Normal rate and regular rhythm.    Pulmonary/Chest: Effort normal and breath sounds normal.   Abdominal: Soft. Bowel sounds are normal.   Musculoskeletal: Normal range of motion. She exhibits no edema.   Lymphadenopathy:     She has no cervical adenopathy.   Neurological: She is alert and oriented to person, place, and time. A cranial nerve deficit is present. She exhibits abnormal muscle tone.   Skin: Skin is warm and dry.   Psychiatric: She has a normal mood and affect. Her behavior is normal.       Significant Labs:   CBC:   Recent Labs  Lab 09/01/17  1150 09/02/17  0454   WBC 23.10* 19.80*   HGB 9.8* 9.6*   HCT 29.8* 30.0*   * 352*    and CMP:   Recent Labs  Lab 09/01/17  1150 09/02/17  0454    135*   K 4.0 4.3    104   CO2 24 24    86   BUN 18 13   CREATININE 0.6 0.6   CALCIUM 8.6* 8.8   PROT 6.6 6.1   ALBUMIN 2.6* 2.5*   BILITOT 0.5 0.4    ALKPHOS 1,311* 1,173*   AST 79* 59*   ALT 67* 57*   ANIONGAP 9 7*   EGFRNONAA >60.0 >60.0       Diagnostic Results:  MRI: reviewed     Assessment/Plan:     Active Diagnoses:    Diagnosis Date Noted POA    PRINCIPAL PROBLEM:  Cerebral infarction, watershed distribution, bilateral, acute [I63.8] 09/01/2017 Yes    Cytotoxic cerebral edema [G93.6] 09/02/2017 Yes    Right facial numbness [R20.0] 09/01/2017 Yes      Problems Resolved During this Admission:    Diagnosis Date Noted Date Resolved POA       A/P  66 year old female with history of PE s/p IVC filter + anticoagulation with therapeutic lovenox dosed daily presents with a stroke. Appearance raises suspicion for embolic versus hypotensive sequelae. Vascular neuro is primary and optimizing medical management. She will need physical rehab to ameliorate her deficits. This will be coordinated within her chemo and travel schedule to and from [place] as she plans to continue her care there for the long term. She will notify our oncology dept if any supportive care is desired locally.        Thank you for your consult. I will sign off. Please contact us if you have any additional questions.    Leslie Iglesias MD  Hematology/Oncology  [facility]

## 2017-09-02 NOTE — ASSESSMENT & PLAN NOTE
-Onset approx 09/10/17 09:00, followed by R hand clumsiness.  Pt had taken lovenox 09/01/17.  -NIHSS 1 on admission.  CT head with no signs of acute infarct or hemorrhage.  -RFs:  Hypercoagulable state 2/2 malignancy and chemotherapy  -Lipid profile, TSH, CMP wnl.  HgbA1c pending, BG has been wnl on this admission.  -On telemetry with no A fib noted.  Recommend 2D Echo--to be done at bedside today.  -CTA head and neck with no abnormalities.  -MRI brain notable for punctate foci of recent infarction consistent with hypotensive episodes.   -PRES also possible given recent chemotherapy, but less likely.  -PT ok with patient going home with outpatient PT.  OT will see patient.  -Likely discharge home today with outpatient [facility] carotids and outpatient PT/OT.

## 2017-09-02 NOTE — PROGRESS NOTES
[facility]  Vascular Neurology  Comprehensive Stroke Center  Progress Note    Assessment/Plan:     65 yo R-handed F with PMHx of liver mass with 07/19/17 pathology concerning for adenocarcinoma (on 3 chemotherapy agents at [facility]), type II NSTEMI 07/12/17, PE with IVC filter placed 08/25/17 and on lovenox (taken this am) presents to [facility] ED 09/01/17 with complaint of R facial numbness and R hand clumsiness starting at approximately 9 am 09/01/17.  Notes ~ 2-3 wks blurred vision in R eye.     Right facial numbness    -Onset approx 09/10/17 09:00, followed by R hand clumsiness.  Pt had taken lovenox 09/01/17.  -NIHSS 1 on admission.  CT head with no signs of acute infarct or hemorrhage.  -RFs:  Hypercoagulable state 2/2 malignancy and chemotherapy  -Lipid profile, TSH, CMP wnl.  HgbA1c pending, BG has been wnl on this admission.  -On telemetry with no A fib noted.  Recommend 2D Echo--to be done at bedside today.  -CTA head and neck with no abnormalities.  -MRI brain notable for punctate foci of recent infarction consistent with hypotensive episodes.   -PRES also possible given recent chemotherapy, but less likely.  -PT ok with patient going home with outpatient PT.  OT will see patient.  -Likely discharge home today with outpatient [facility] carotids and outpatient PT/OT.     Neurologic Chief Complaint: R facial numbness, R hand clumsiness    Subjective:     Interval History: Patient is seen for follow-up neurological assessment and treatment recommendations.  Patient notes continued R facial numbness in same distribution (R tip of nose to upper lip with some mid-cheek involvement) and has some improvement in the RUE but still feels slightly weak as compared to LUE.  She otherwise has done well other than n/v this am.  Discussed MRI brain findings with patient in regards to hypotensive episodes.  Later on rounds, discussed findings with patient and patient's .  Also noted that findings could  be related to starting chemotherapy.  Made conservative recommendations for improved BP control and counseled patient on lying down with orthostatic/hypotensive symptoms--she had noted dizziness yesterday prior to onset of symptoms.    HPI, Past Medical, Family, and Social History remains the same as documented in the initial encounter.     Review of Systems   Constitutional: Negative for chills and fever.   Eyes: Negative for photophobia and visual disturbance.   Respiratory: Negative for cough and shortness of breath.    Cardiovascular: Negative for chest pain and palpitations.   Gastrointestinal: Positive for nausea and vomiting.   Musculoskeletal: Negative for arthralgias.   Skin: Negative for rash and wound.   Neurological: Positive for facial asymmetry, weakness and numbness. Negative for headaches.   Hematological: Negative for adenopathy. Does not bruise/bleed easily.   Psychiatric/Behavioral: Negative for agitation and confusion.     Scheduled Meds:   aspirin  325 mg Oral Daily    atorvastatin  40 mg Oral Daily    enoxaparin  1.5 mg/kg Subcutaneous Daily    lipase-protease-amylase 12,000-38,000-60,000 units  1 capsule Oral TID WM    morphine  15 mg Oral Q12H    polyethylene glycol  17 g Oral Daily    sodium chloride 0.9%  3 mL Intravenous Q8H     Continuous Infusions:   sodium chloride 0.9%       PRN Meds:labetalol, ondansetron, oxycodone, prochlorperazine, sodium chloride 0.9%    Objective:     Vital Signs (Most Recent):  Temp: 97.9 °F (36.6 °C) (09/02/17 0400)  Pulse: 82 (09/02/17 0755)  Resp: 16 (09/02/17 0400)  BP: 112/60 (09/02/17 0400)  SpO2: 95 % (09/02/17 0400)  BP Location: Left arm    Vital Signs Range (Last 24H):  Temp:  [97.7 °F (36.5 °C)-98.6 °F (37 °C)]   Pulse:  []   Resp:  [12-24]   BP: (112-133)/(58-75)   SpO2:  [95 %-99 %]   BP Location: Left arm    Physical Exam  General:  Well-developed, well-nourished, nad  HEENT:  NCAT, PERRLA, EOMI.  Oropharyngeal membranes  non-erythematous/without exudate.  Neck:  Supple, no palpable lad, normal ROM without nuchal rigidity.  Resp:  Symmetric expansion, no increased wob, CTAB  CVS:  RRR, no m/r/g.  No LE edema.  Peripheral pulses 2+ (radial, dorsalis pedis)  Abd:  Abd soft, non-distended, non-tender to palpation, +BS    Neurological Exam:   Mental Status:  Awake, alert, oriented x 3.  Recent, remote memory intact.  Speech appropriate.  Cranial Nerves:  VFs intact to movement bilaterally in all quadrants. PERRLA, EOMI. Smile slightly asymmetric with droop on L side, otherwise facial movements and sensation intact and symmetric.  Despite asymmetry, facial strength 5/5 bilaterally. Palate raises symmetrically, tongue protrudes midline. SCM/trapezius strength 5/5.  Motor:  Normal bulk and tone.  Strength 5/5 shoulder abduction, biceps/triceps,  strength, hip flexion, knee flexion/extension, plantarflexion/dorsiflexion.  No pronator drift.  Sensory:  Intact to light touch at all extremities with no inattention/extinction.  Intact to temperature throughout, able to discern pinprick from dull sensation at all extremities.  Reflexes:  Biceps, brachioradialis, patellar 2+.  No clonus.  Downgoing toe bilaterally.  Coordination:  Slow on LUISA but coordinated.  FNF, HTS without ataxia or dysmetria.  Gait:  Normal stride without shuffling, normal arm swing.  No imbalance.      NIH Stroke Scale:  Interval: 24 hours post onset of symptoms +/- 20 mins  Level of Consciousness: 0 - alert  LOC Questions: 0 - answers both correctly  LOC Commands: 0 - performs both correctly  Best Gaze: 0 - normal  Visual: 0 - no visual loss  Facial Palsy: 1 - minor  Motor Left Arm: 0 - no drift  Motor Right Arm: 0 - no drift  Motor Left Le - no drift  Motor Right Le - no drift  Limb Ataxia: 0 - absent  Sensory: 0 - normal  Best Language: 0 - no aphasia  Dysarthria: 0 - normal articulation  Extinction and Inattention: 0 - no neglect  NIH Stroke Scale Total:  1    Laboratory:  CMP:   Recent Labs  Lab 09/02/17  0454   CALCIUM 8.8   ALBUMIN 2.5*   PROT 6.1   *   K 4.3   CO2 24      BUN 13   CREATININE 0.6   ALKPHOS 1,173*   ALT 57*   AST 59*   BILITOT 0.4     CBC:   Recent Labs  Lab 09/02/17  0454   WBC 19.80*   RBC 3.30*   HGB 9.6*   HCT 30.0*   *   MCV 91   MCH 29.1   MCHC 32.0     Lipid Panel:   Recent Labs  Lab 09/01/17  1150   CHOL 169   LDLCALC 94.6   HDL 52   TRIG 112     Coagulation:   Recent Labs  Lab 09/02/17  0454   INR 0.9   APTT 22.5     Hgb A1C: PENDING--BG wnl on CMP x 3    TSH:   Recent Labs  Lab 09/01/17  1150   TSH 1.356     Diagnostic Results:  09/01/17 CT head w/o contrast:  No evidence of acute intracranial pathology.  Recommend MRI with diffusion for further evaluation if stroke is suspected.    09/01/17 CTA head and neck:  No acute intracranial abnormality.  CT angiogram of the head and neck demonstrates no significant vascular abnormality.    09/01/17 MRI brain w/ w/o contrast:  Punctate foci of recent infarction involving the bilateral frontoparietal regions, the distribution of which suggests sequela of recent hypotensive episode versus embolic phenomenon.  No intracranial hemorrhage.  Mild chronic microvascular ischemic changes.      Kelsey Sotelo MD  Comprehensive Stroke Center  Department of Vascular Neurology   [facility]

## 2017-09-02 NOTE — ASSESSMENT & PLAN NOTE
-Onset approx 09/10/17 09:00, followed by R hand clumsiness.  Pt had taken lovenox 09/01/17.  -NIHSS 1 on admission.  CT head with no signs of acute infarct or hemorrhage.  -RFs:  Hypercoagulable state 2/2 malignancy and chemotherapy  -Lipid profile, TSH, CMP wnl.  HgbA1c pending, BG has been wnl on this admission.  -On telemetry with no A fib noted.  Recommend 2D Echo--to be done at bedside today.  -CTA head and neck with no abnormalities.  -MRI brain notable for punctate foci of recent infarction consistent with hypotensive episodes in a watershed distribution.   -PRES also possible given recent chemotherapy, but felt less likely.  -PT ok with patient going home with outpatient PT.  OT will see patient.  -Likely discharge home today with outpatient [facility] carotids and outpatient PT/OT.

## 2017-09-02 NOTE — SUBJECTIVE & OBJECTIVE
NIH Stroke Scale:  Interval: 7 days or at discharge (whichever comes first)  Level of Consciousness: 0 - alert  LOC Questions: 0 - answers both correctly  LOC Commands: 0 - performs both correctly  Best Gaze: 0 - normal  Visual: 0 - no visual loss  Facial Palsy: 1 - minor  Motor Left Arm: 0 - no drift  Motor Right Arm: 0 - no drift  Motor Left Le - no drift  Motor Right Le - no drift  Limb Ataxia: 0 - absent  Sensory: 0 - normal  Best Language: 0 - no aphasia  Dysarthria: 0 - normal articulation  Extinction and Inattention: 0 - no neglect  NIH Stroke Scale Total: 1  David Coma Scale:  Best Eye Response: 4 - spontaneous  Best Motor Response: 6 - obeys commands  Best Verbal Response: 5 - oriented  [place] Coma Scale Total: 15  Modified [place] Scale:   Timeline: At discharge  Modified [place] Score: 1 - no significant disability    [id number] Scale:   Age: 1 - 65-74 years old  CHF History: 0 - no  HTN History: 0 - no  Stroke/TIA/Thromboembolism History: 0 - no  Vascular Disease History: 0 - no  Diabetes Mellitus in History: 0 - no  Female: 1 - yes  [id number] Scale Total: 2

## 2017-09-02 NOTE — ASSESSMENT & PLAN NOTE
Visualized in both hemispheres in the MRI.  Patient with history of low blood pressure which likely contributed to this pattern.

## 2017-09-02 NOTE — SUBJECTIVE & OBJECTIVE
Neurologic Chief Complaint: R facial numbness, R hand clumsiness    Subjective:     Interval History: Patient is seen for follow-up neurological assessment and treatment recommendations.  Patient notes continued R facial numbness in same distribution (R tip of nose to upper lip with some mid-cheek involvement) and has some improvement in the RUE but still feels slightly weak as compared to LUE.  She otherwise has done well other than n/v this am.  Discussed MRI brain findings with patient in regards to hypotensive episodes.  Later on rounds, discussed findings with patient and patient's .  Also noted that findings could be related to starting chemotherapy.  Made conservative recommendations for improved BP control and counseled patient on lying down with orthostatic/hypotensive symptoms--she had noted dizziness yesterday prior to onset of symptoms.    HPI, Past Medical, Family, and Social History remains the same as documented in the initial encounter.     Review of Systems   Constitutional: Negative for chills and fever.   Eyes: Negative for photophobia and visual disturbance.   Respiratory: Negative for cough and shortness of breath.    Cardiovascular: Negative for chest pain and palpitations.   Gastrointestinal: Positive for nausea and vomiting.   Musculoskeletal: Negative for arthralgias.   Skin: Negative for rash and wound.   Neurological: Positive for facial asymmetry, weakness and numbness. Negative for headaches.   Hematological: Negative for adenopathy. Does not bruise/bleed easily.   Psychiatric/Behavioral: Negative for agitation and confusion.     Scheduled Meds:   aspirin  325 mg Oral Daily    atorvastatin  40 mg Oral Daily    enoxaparin  1.5 mg/kg Subcutaneous Daily    lipase-protease-amylase 12,000-38,000-60,000 units  1 capsule Oral TID WM    morphine  15 mg Oral Q12H    polyethylene glycol  17 g Oral Daily    sodium chloride 0.9%  3 mL Intravenous Q8H     Continuous Infusions:   sodium  chloride 0.9%       PRN Meds:labetalol, ondansetron, oxycodone, prochlorperazine, sodium chloride 0.9%    Objective:     Vital Signs (Most Recent):  Temp: 97.9 °F (36.6 °C) (09/02/17 0400)  Pulse: 82 (09/02/17 0755)  Resp: 16 (09/02/17 0400)  BP: 112/60 (09/02/17 0400)  SpO2: 95 % (09/02/17 0400)  BP Location: Left arm    Vital Signs Range (Last 24H):  Temp:  [97.7 °F (36.5 °C)-98.6 °F (37 °C)]   Pulse:  []   Resp:  [12-24]   BP: (112-133)/(58-75)   SpO2:  [95 %-99 %]   BP Location: Left arm    Physical Exam  General:  Well-developed, well-nourished, nad  HEENT:  NCAT, PERRLA, EOMI.  Oropharyngeal membranes non-erythematous/without exudate.  Neck:  Supple, no palpable lad, normal ROM without nuchal rigidity.  Resp:  Symmetric expansion, no increased wob, CTAB  CVS:  RRR, no m/r/g.  No LE edema.  Peripheral pulses 2+ (radial, dorsalis pedis)  Abd:  Abd soft, non-distended, non-tender to palpation, +BS    Neurological Exam:   Mental Status:  Awake, alert, oriented x 3.  Recent, remote memory intact.  Speech appropriate.  Cranial Nerves:  VFs intact to movement bilaterally in all quadrants. PERRLA, EOMI. Smile slightly asymmetric with droop on L side, otherwise facial movements and sensation intact and symmetric.  Despite asymmetry, facial strength 5/5 bilaterally. Palate raises symmetrically, tongue protrudes midline. SCM/trapezius strength 5/5.  Motor:  Normal bulk and tone.  Strength 5/5 shoulder abduction, biceps/triceps,  strength, hip flexion, knee flexion/extension, plantarflexion/dorsiflexion.  No pronator drift.  Sensory:  Intact to light touch at all extremities with no inattention/extinction.  Intact to temperature throughout, able to discern pinprick from dull sensation at all extremities.  Reflexes:  Biceps, brachioradialis, patellar 2+.  No clonus.  Downgoing toe bilaterally.  Coordination:  Slow on LUISA but coordinated.  FNF, HTS without ataxia or dysmetria.  Gait:  Normal stride without  shuffling, normal arm swing.  No imbalance.      NIH Stroke Scale:  Interval: 24 hours post onset of symptoms +/- 20 mins  Level of Consciousness: 0 - alert  LOC Questions: 0 - answers both correctly  LOC Commands: 0 - performs both correctly  Best Gaze: 0 - normal  Visual: 0 - no visual loss  Facial Palsy: 0 - normal  Motor Left Arm: 0 - no drift  Motor Right Arm: 0 - no drift  Motor Left Le - no drift  Motor Right Le - no drift  Limb Ataxia: 0 - absent  Sensory: 0 - normal  Best Language: 0 - no aphasia  Dysarthria: 0 - normal articulation  Extinction and Inattention: 0 - no neglect  NIH Stroke Scale Total: 0    Laboratory:  CMP:   Recent Labs  Lab 17  0454   CALCIUM 8.8   ALBUMIN 2.5*   PROT 6.1   *   K 4.3   CO2 24      BUN 13   CREATININE 0.6   ALKPHOS 1,173*   ALT 57*   AST 59*   BILITOT 0.4     CBC:   Recent Labs  Lab 17  0454   WBC 19.80*   RBC 3.30*   HGB 9.6*   HCT 30.0*   *   MCV 91   MCH 29.1   MCHC 32.0     Lipid Panel:   Recent Labs  Lab 17  1150   CHOL 169   LDLCALC 94.6   HDL 52   TRIG 112     Coagulation:   Recent Labs  Lab 17  0454   INR 0.9   APTT 22.5     Hgb A1C: PENDING--BG wnl on CMP x 3    TSH:   Recent Labs  Lab 17  1150   TSH 1.356     Diagnostic Results:  17 CT head w/o contrast:  No evidence of acute intracranial pathology.  Recommend MRI with diffusion for further evaluation if stroke is suspected.    17 CTA head and neck:  No acute intracranial abnormality.  CT angiogram of the head and neck demonstrates no significant vascular abnormality.    17 MRI brain w/ w/o contrast:  Punctate foci of recent infarction involving the bilateral frontoparietal regions, the distribution of which suggests sequela of recent hypotensive episode versus embolic phenomenon.  No intracranial hemorrhage.  Mild chronic microvascular ischemic changes.

## 2017-09-02 NOTE — PT/OT/SLP PROGRESS
"Physical Therapy  Treatment    Andra Matamoros   MRN: [id number]   Admitting Diagnosis: Cerebral infarction, watershed distribution, bilateral, acute    PT Received On: 09/02/17  PT Start Time: 1437     PT Stop Time: 1502    PT Total Time (min): 25 min       Billable Minutes:  Therapeutic Activity 25    Treatment Type: Treatment  PT/PTA: PTA     PTA Visit Number: 1       General Precautions: Standard, aspiration, fall  Orthopedic Precautions: N/A   Braces: N/A         Subjective:  Communicated with RN (Basilio) prior to session.  Pt agreeable to PT session.  Pt with c/o nausea    Pain/Comfort  Pain Rating 1: 0/10  Pain Rating Post-Intervention 1: 0/10    Objective:   Patient found with: NO line attachments 2* RN had just removed them (Pt found sup in bed with  present in the room)        FUNCTIONAL MOBILITY    Bed Mobility        Sup > sit at the EOB with independence        Sit > sup NP 2* pt was left at the EOB           Transfers  (vc's for hand placement and safety of task)       Sit > stand from EOB with no AD requiring independence        Stand > sit to EOB requiring independence    Gait        Distance: 400ft (while performing horizontal head turns)       Assistive Device: no AD       Assistance Required:  independence       Verbal Cues required: to change directions       Gait Deviations: Pt demonstrates no gait deviations or LOB during gait      ELEVATIONS    Stairs       Pt refused to perform stating "I don't have to go upstairs my bedroom and bathroom are downstairs"      Education:  Education provided to patient and  regarding safety at home/community.  Discussed identification of stroke warning signs and risk factors using Stroke Patient Education Guide booklet.  All questions and concerns addressed by PTA within scope of practice      AM-PAC 6 CLICK MOBILITY  How much help from another person does this patient currently need?   1 = Unable, Total/Dependent Assistance  2 = A lot, " Maximum/Moderate Assistance  3 = A little, Minimum/Contact Guard/Supervision  4 = None, Modified [place]/Independent    Turning over in bed (including adjusting bedclothes, sheets and blankets)?: 4  Sitting down on and standing up from a chair with arms (e.g., wheelchair, bedside commode, etc.): 4  Moving from lying on back to sitting on the side of the bed?: 4  Moving to and from a bed to a chair (including a wheelchair)?: 4  Need to walk in hospital room?: 3  Climbing 3-5 steps with a railing?: 3  Total Score: 22    AM-PAC Raw Score CMS G-Code Modifier Level of Impairment Assistance   6 % Total / Unable   7 - 9 CM 80 - 100% Maximal Assist   10 - 14 CL 60 - 80% Moderate Assist   15 - 19 CK 40 - 60% Moderate Assist   20 - 22 CJ 20 - 40% Minimal Assist   23 CI 1-20% SBA / CGA   24 CH 0% Independent/ Mod I     Patient left sitting at the EOB with call button in reach, RN notified and  present.    Assessment:  Andra Matamoros is a 66 y.o. female with a medical diagnosis of Cerebral infarction, watershed distribution, bilateral, acute and presents with improved balance and endurance for OOB mobility.      Rehab identified problem list/impairments: Rehab identified problem list/impairments: weakness, impaired endurance, impaired self care skills, impaired functional mobilty, gait instability    Rehab potential is good.    Activity tolerance: Good    Discharge recommendations: Discharge Facility/Level Of Care Needs: outpatient PT     Barriers to discharge: Barriers to Discharge: None    Equipment recommendations: Equipment Needed After Discharge: none     GOALS:    Physical Therapy Goals        Problem: Physical Therapy Goal    Goal Priority Disciplines Outcome Goal Variances Interventions   Physical Therapy Goal     PT/OT, PT Ongoing (interventions implemented as appropriate)     Description:  Goals to be met by: 9/8/2017     Patient will increase functional independence with mobility by  performin. Gait  x 200 feet with [place] and no LOB or path deviation with no assistive device.    MET 2017  2. Ascend/descend 1 flight of stairs with 1 hand rail and stand by assist to access home environment.     NOT MET  3. Patient will perform dynamic gait activities with no instability or LOB.     NOT MET                    PLAN:  Pt was discharged from hospital on this date.  Full discharge summary to follow.       Plan of Care reviewed with: patient, spouse         Akilah Bartlett, PTA  2017

## 2019-03-27 NOTE — ED NOTES
Bed: 13  Expected date: 7/12/17  Expected time: 3:19 PM  Means of arrival:   Comments:  NO BED   c/o pain / swelling Rt hand noted this morning , c/o unable to move Rt 4th and 5th fingers

## 2021-09-27 NOTE — ASSESSMENT & PLAN NOTE
Elevated alkaline phosphatase level  -  /  (baseline AST 28-36 / ALT 22-56)  - Alk Phos 564 (baseline 124-345)  - Hepatology consulted   - CT abdomen performed 6/21/17 with interval development of heterogeneous primarily hypodense area involving the majority of the left hepatic lobe, hepatomegaly, and mild splenomegaly.  - MRI of abdomen with contrast scheduled for 7/13/17 - may need to notify radiology of inpatient status   - f/u on repeat labs  - followed by Dr. Sanches outpatient hepatology    Abnormal RR

## 2022-01-25 NOTE — MR AVS SNAPSHOT
"    [facility]  [address]  [facility] [address]  Phone: [phone]  Fax: [phone]                  Andra LOVETT Shiloh   3/23/2017 11:15 AM   Office Visit    Description:  Female : 1950   Provider:  Brian Everett III, MD   Department:  [facility] - Otorhinolaryngology           Diagnoses this Visit        Comments    Cough in adult    -  Primary     Hiatal hernia with GERD                To Do List           Goals (5 Years of Data)     None      [facility]     [facility] -  Assistance  Registered nurses in the [facility] provide clinical advisement, health education, appointment booking, and other advisory services.  Call for this free service at [phone].             Medications           Message regarding Medications     Verify the changes and/or additions to your medication regime listed below are the same as discussed with your clinician today.  If any of these changes or additions are incorrect, please notify your healthcare provider.             Verify that the below list of medications is an accurate representation of the medications you are currently taking.  If none reported, the list may be blank. If incorrect, please contact your healthcare provider. Carry this list with you in case of emergency.                Clinical Reference Information           Your Vitals Were     BP Pulse Temp    104/69 (BP Location: Right arm, Patient Position: Sitting, BP Method: Automatic) 86 97.9 °F (36.6 °C) (Tympanic)    Height Weight BMI    5' 4" (1.626 m) 65.4 kg (144 lb 2.9 oz) 24.75 kg/m2      Blood Pressure          Most Recent Value    BP  104/69      Allergies as of 3/23/2017     No Known Allergies      Immunizations Administered on Date of Encounter - 3/23/2017     None      Instructions    Endoscopy performed  I recommend consultation with Dr. [facility] Velazquez +/- speech evaluation with COURTNEY Bruno  Voice rest prn  Strict " Labs ordered.  Please see note to patient.   anti GERD measures should help; literatrue provided; PPI use encouraged prn  Hydration encouraged ( to thin secretions)   Call for any significant change in status  Mucinex DM for cough prn                   Language Assistance Services     ATTENTION: Language assistance services are available, free of charge. Please call [phone].      ATENCIÓN: Si habla español, tiene a fontana disposición servicios gratuitos de asistencia lingüística. Llame al [phone].     CHÚ Ý: N?u b?n nói Ti?ng Vi?t, có các d?ch v? h? tr? ngôn ng? mi?n phí dành cho b?n. G?i s? [phone].         [facility] complies with applicable Federal civil rights laws and does not discriminate on the basis of race, color, national origin, age, disability, or sex.

## 2022-10-06 ENCOUNTER — PATIENT MESSAGE (OUTPATIENT)
Dept: INTERNAL MEDICINE | Facility: CLINIC | Age: 72
End: 2022-10-06
Payer: MEDICARE

## 2022-10-24 ENCOUNTER — PATIENT MESSAGE (OUTPATIENT)
Dept: INTERNAL MEDICINE | Facility: CLINIC | Age: 72
End: 2022-10-24
Payer: MEDICARE